# Patient Record
Sex: MALE | Race: WHITE | Employment: OTHER | URBAN - METROPOLITAN AREA
[De-identification: names, ages, dates, MRNs, and addresses within clinical notes are randomized per-mention and may not be internally consistent; named-entity substitution may affect disease eponyms.]

---

## 2017-03-24 ENCOUNTER — TELEPHONE (OUTPATIENT)
Dept: FAMILY MEDICINE CLINIC | Facility: CLINIC | Age: 57
End: 2017-03-24

## 2017-03-24 ENCOUNTER — OFFICE VISIT (OUTPATIENT)
Dept: FAMILY MEDICINE CLINIC | Facility: CLINIC | Age: 57
End: 2017-03-24

## 2017-03-24 VITALS
WEIGHT: 315 LBS | RESPIRATION RATE: 15 BRPM | TEMPERATURE: 98 F | HEART RATE: 72 BPM | DIASTOLIC BLOOD PRESSURE: 78 MMHG | BODY MASS INDEX: 61 KG/M2 | SYSTOLIC BLOOD PRESSURE: 128 MMHG | OXYGEN SATURATION: 97 %

## 2017-03-24 DIAGNOSIS — Z13.29 SCREENING FOR ENDOCRINE, METABOLIC AND IMMUNITY DISORDER: ICD-10-CM

## 2017-03-24 DIAGNOSIS — J01.10 ACUTE NON-RECURRENT FRONTAL SINUSITIS: Primary | ICD-10-CM

## 2017-03-24 DIAGNOSIS — Z13.0 SCREENING FOR ENDOCRINE, METABOLIC AND IMMUNITY DISORDER: ICD-10-CM

## 2017-03-24 DIAGNOSIS — Z13.228 SCREENING FOR ENDOCRINE, METABOLIC AND IMMUNITY DISORDER: ICD-10-CM

## 2017-03-24 DIAGNOSIS — E66.01 MORBID OBESITY, UNSPECIFIED OBESITY TYPE (HCC): ICD-10-CM

## 2017-03-24 DIAGNOSIS — R06.81 APNEA: ICD-10-CM

## 2017-03-24 DIAGNOSIS — F41.1 ANXIETY STATE: ICD-10-CM

## 2017-03-24 DIAGNOSIS — R73.03 PRE-DIABETES: ICD-10-CM

## 2017-03-24 PROCEDURE — 99214 OFFICE O/P EST MOD 30 MIN: CPT | Performed by: PHYSICIAN ASSISTANT

## 2017-03-24 RX ORDER — AMOXICILLIN AND CLAVULANATE POTASSIUM 875; 125 MG/1; MG/1
1 TABLET, FILM COATED ORAL 2 TIMES DAILY
Qty: 20 TABLET | Refills: 0 | Status: SHIPPED | OUTPATIENT
Start: 2017-03-24 | End: 2017-04-03

## 2017-03-24 NOTE — TELEPHONE ENCOUNTER
Patient has HMO. Patient must call Minerva Echols at [phone] for referral.  This is how O initiates referrals. Thank you!

## 2017-03-24 NOTE — PROGRESS NOTES
CHIEF COMPLAINT:   Patient presents with:  Nasal Congestion: Pt c/o nasal congestion, cough and sneezing X 2 days         HPI:   Bebe Larson is a 49year old male who presents for congestion for 2 days.  He admits to sneezing, sinus pressure, post nasal well nourished,in no apparent distress  SKIN: no rashes, no suspicious lesions  EYES: Conjunctiva clear. No scleral icterus. HENT: Atraumatic, normocephalic. +TTP of frontal sinuses. TM's pearly gray, no bulging, + fluid b/l.   Nostrils patent, nasal muc

## 2017-03-29 ENCOUNTER — TELEPHONE (OUTPATIENT)
Dept: FAMILY MEDICINE CLINIC | Facility: CLINIC | Age: 57
End: 2017-03-29

## 2017-03-29 DIAGNOSIS — E55.9 VITAMIN D DEFICIENCY: Primary | ICD-10-CM

## 2017-03-29 DIAGNOSIS — R79.89 LOW SERUM TESTOSTERONE LEVEL: ICD-10-CM

## 2017-03-29 RX ORDER — LEVOFLOXACIN 500 MG/1
500 TABLET, FILM COATED ORAL DAILY
Qty: 5 TABLET | Refills: 0 | Status: SHIPPED | OUTPATIENT
Start: 2017-03-29 | End: 2017-04-03

## 2017-03-29 NOTE — TELEPHONE ENCOUNTER
Please read above message & advise on any orders. Pt seen 3/24 for Sinusitis & given Rx for Augmentin.        Per previous encounter: \"patient requesting that order for vitamin d and testosterone labs be entered into epic, dr Gilmer Palafox had been monitoring th

## 2017-03-29 NOTE — TELEPHONE ENCOUNTER
Rx for levaquin sent in-recheck if not better.  Can you please let him know he can call [facility] for therapist referral

## 2017-04-10 ENCOUNTER — OFFICE VISIT (OUTPATIENT)
Dept: FAMILY MEDICINE CLINIC | Facility: CLINIC | Age: 57
End: 2017-04-10

## 2017-04-10 ENCOUNTER — HOSPITAL ENCOUNTER (OUTPATIENT)
Dept: GENERAL RADIOLOGY | Age: 57
Discharge: HOME | End: 2017-04-10
Attending: PHYSICIAN ASSISTANT
Payer: COMMERCIAL

## 2017-04-10 VITALS
DIASTOLIC BLOOD PRESSURE: 72 MMHG | HEART RATE: 62 BPM | OXYGEN SATURATION: 96 % | TEMPERATURE: 99 F | SYSTOLIC BLOOD PRESSURE: 138 MMHG | RESPIRATION RATE: 18 BRPM

## 2017-04-10 DIAGNOSIS — R60.9 EDEMA, UNSPECIFIED TYPE: ICD-10-CM

## 2017-04-10 DIAGNOSIS — R05.9 COUGH: ICD-10-CM

## 2017-04-10 DIAGNOSIS — R09.81 NASAL CONGESTION: ICD-10-CM

## 2017-04-10 DIAGNOSIS — R60.9 EDEMA, UNSPECIFIED TYPE: Primary | ICD-10-CM

## 2017-04-10 PROCEDURE — 99214 OFFICE O/P EST MOD 30 MIN: CPT | Performed by: PHYSICIAN ASSISTANT

## 2017-04-10 PROCEDURE — 71020 XR CHEST PA + LAT CHEST (CPT=71020): CPT

## 2017-04-10 RX ORDER — POTASSIUM CHLORIDE 1500 MG/1
20 TABLET, FILM COATED, EXTENDED RELEASE ORAL DAILY
Qty: 30 TABLET | Refills: 0 | Status: SHIPPED | OUTPATIENT
Start: 2017-04-10 | End: 2017-05-07

## 2017-04-10 RX ORDER — FUROSEMIDE 40 MG/1
40 TABLET ORAL DAILY
Qty: 30 TABLET | Refills: 0 | Status: SHIPPED | OUTPATIENT
Start: 2017-04-10 | End: 2017-06-11

## 2017-04-10 NOTE — PROGRESS NOTES
CHIEF COMPLAINT:     Patient presents with:  Swollen Feet: pt c\o of swelling of feet bilateral, x 2days      HPI:   Rox Duvall is a 49year old male who presents with complaints of b/l ankle/feet swelling for 2 days. No pain in the feet/ankles/calfs. rashes, skin wounds or ulcers. EYES: Denies blurred vision or double vision  HENT: See HPI   CHEST: Denies chest pain, or palpitations  LUNGS: +cough.  Denies shortness of breath   MUSCULOSKELETAL: no arthralgia or swollen joints  NEURO: Denies headaches o

## 2017-04-20 ENCOUNTER — LAB ENCOUNTER (OUTPATIENT)
Dept: LAB | Age: 57
End: 2017-04-20
Attending: FAMILY MEDICINE
Payer: COMMERCIAL

## 2017-04-20 DIAGNOSIS — Z13.29 SCREENING FOR ENDOCRINE, METABOLIC AND IMMUNITY DISORDER: ICD-10-CM

## 2017-04-20 DIAGNOSIS — Z13.0 SCREENING FOR ENDOCRINE, METABOLIC AND IMMUNITY DISORDER: ICD-10-CM

## 2017-04-20 DIAGNOSIS — R73.03 PRE-DIABETES: ICD-10-CM

## 2017-04-20 DIAGNOSIS — E55.9 VITAMIN D DEFICIENCY: ICD-10-CM

## 2017-04-20 DIAGNOSIS — Z13.228 SCREENING FOR ENDOCRINE, METABOLIC AND IMMUNITY DISORDER: ICD-10-CM

## 2017-04-20 DIAGNOSIS — R79.89 LOW SERUM TESTOSTERONE LEVEL: ICD-10-CM

## 2017-04-20 DIAGNOSIS — Z12.5 SCREENING FOR PROSTATE CANCER: ICD-10-CM

## 2017-04-20 PROCEDURE — 85025 COMPLETE CBC W/AUTO DIFF WBC: CPT

## 2017-04-20 PROCEDURE — 80053 COMPREHEN METABOLIC PANEL: CPT

## 2017-04-20 PROCEDURE — 83036 HEMOGLOBIN GLYCOSYLATED A1C: CPT

## 2017-04-20 PROCEDURE — 82306 VITAMIN D 25 HYDROXY: CPT

## 2017-04-20 PROCEDURE — 84403 ASSAY OF TOTAL TESTOSTERONE: CPT

## 2017-04-20 PROCEDURE — 80061 LIPID PANEL: CPT

## 2017-04-20 PROCEDURE — 84402 ASSAY OF FREE TESTOSTERONE: CPT

## 2017-04-20 PROCEDURE — 36415 COLL VENOUS BLD VENIPUNCTURE: CPT

## 2017-04-20 PROCEDURE — 84443 ASSAY THYROID STIM HORMONE: CPT

## 2017-04-21 ENCOUNTER — TELEPHONE (OUTPATIENT)
Dept: FAMILY MEDICINE CLINIC | Facility: CLINIC | Age: 57
End: 2017-04-21

## 2017-04-21 DIAGNOSIS — D69.6 DECREASED PLATELET COUNT (HCC): Primary | ICD-10-CM

## 2017-04-21 NOTE — TELEPHONE ENCOUNTER
Patient presented to the office today to review his test results and plan of care. Reviewed lab results with him. Order for repeat CBC placed for 1 month re-draw. He has upcoming appointment scheduled on 4/25/17 with Karol Rust.  He states he has been going t

## 2017-04-21 NOTE — TELEPHONE ENCOUNTER
----- Message from Yasmin Hunter PA-C sent at 4/21/2017 12:08 PM CDT -----  Platelets are low-repeat CBC in one month. All other labs are stable.

## 2017-04-25 ENCOUNTER — OFFICE VISIT (OUTPATIENT)
Dept: FAMILY MEDICINE CLINIC | Facility: CLINIC | Age: 57
End: 2017-04-25

## 2017-04-25 VITALS
TEMPERATURE: 98 F | WEIGHT: 315 LBS | RESPIRATION RATE: 17 BRPM | HEART RATE: 73 BPM | SYSTOLIC BLOOD PRESSURE: 126 MMHG | BODY MASS INDEX: 61 KG/M2 | DIASTOLIC BLOOD PRESSURE: 80 MMHG | OXYGEN SATURATION: 95 %

## 2017-04-25 DIAGNOSIS — R60.9 EDEMA, UNSPECIFIED TYPE: ICD-10-CM

## 2017-04-25 DIAGNOSIS — R73.09 OTHER ABNORMAL GLUCOSE: ICD-10-CM

## 2017-04-25 DIAGNOSIS — E29.1 TESTICULAR HYPOFUNCTION: ICD-10-CM

## 2017-04-25 DIAGNOSIS — G47.30 SLEEP APNEA, UNSPECIFIED TYPE: ICD-10-CM

## 2017-04-25 DIAGNOSIS — E66.01 MORBID OBESITY, UNSPECIFIED OBESITY TYPE (HCC): Primary | ICD-10-CM

## 2017-04-25 DIAGNOSIS — D69.6 THROMBOCYTOPENIA (HCC): ICD-10-CM

## 2017-04-25 PROCEDURE — 99214 OFFICE O/P EST MOD 30 MIN: CPT | Performed by: PHYSICIAN ASSISTANT

## 2017-04-25 NOTE — PROGRESS NOTES
CHIEF COMPLAINT:     Patient presents with:  Lab Results: Pt is following up on lab results       HPI:   Symone Hoffman is a 56year old male who presents to follow up on lab results and lower ext edema.  He has been taking diuretic daily and has significa palpitations  LUNGS: Denies shortness of breath, cough, or wheezing  GI: Denies abdominal pain, N/V/C/D.   MUSCULOSKELETAL: no arthralgia or swollen joints      EXAM:   /80 mmHg  Pulse 73  Temp(Src) 98.3 °F (36.8 °C) (Oral)  Resp 17  Wt 412 lb  SpO2

## 2017-05-08 RX ORDER — POTASSIUM CHLORIDE 1500 MG/1
TABLET, FILM COATED, EXTENDED RELEASE ORAL
Qty: 30 TABLET | Refills: 0 | Status: SHIPPED | OUTPATIENT
Start: 2017-05-08 | End: 2017-06-10

## 2017-05-08 RX ORDER — FUROSEMIDE 40 MG/1
TABLET ORAL
Qty: 30 TABLET | Refills: 0 | Status: SHIPPED | OUTPATIENT
Start: 2017-05-08 | End: 2018-01-10 | Stop reason: ALTCHOICE

## 2017-06-12 RX ORDER — FUROSEMIDE 40 MG/1
TABLET ORAL
Qty: 30 TABLET | Refills: 0 | Status: SHIPPED | OUTPATIENT
Start: 2017-06-12 | End: 2017-07-14

## 2017-06-12 RX ORDER — POTASSIUM CHLORIDE 1500 MG/1
TABLET, FILM COATED, EXTENDED RELEASE ORAL
Qty: 30 TABLET | Refills: 0 | Status: SHIPPED | OUTPATIENT
Start: 2017-06-12 | End: 2017-07-14

## 2017-07-17 RX ORDER — FUROSEMIDE 40 MG/1
TABLET ORAL
Qty: 30 TABLET | Refills: 0 | Status: SHIPPED | OUTPATIENT
Start: 2017-07-17 | End: 2017-08-18

## 2017-07-17 RX ORDER — POTASSIUM CHLORIDE 1500 MG/1
TABLET, FILM COATED, EXTENDED RELEASE ORAL
Qty: 30 TABLET | Refills: 0 | Status: SHIPPED | OUTPATIENT
Start: 2017-07-17 | End: 2018-01-19 | Stop reason: ALTCHOICE

## 2017-08-18 ENCOUNTER — OFFICE VISIT (OUTPATIENT)
Dept: FAMILY MEDICINE CLINIC | Facility: CLINIC | Age: 57
End: 2017-08-18

## 2017-08-18 ENCOUNTER — APPOINTMENT (OUTPATIENT)
Dept: CT IMAGING | Age: 57
End: 2017-08-18
Attending: EMERGENCY MEDICINE
Payer: COMMERCIAL

## 2017-08-18 ENCOUNTER — HOSPITAL ENCOUNTER (EMERGENCY)
Age: 57
Discharge: HOME | End: 2017-08-18
Attending: EMERGENCY MEDICINE
Payer: COMMERCIAL

## 2017-08-18 VITALS
SYSTOLIC BLOOD PRESSURE: 150 MMHG | RESPIRATION RATE: 16 BRPM | OXYGEN SATURATION: 96 % | HEIGHT: 69 IN | HEART RATE: 88 BPM | BODY MASS INDEX: 46.65 KG/M2 | WEIGHT: 315 LBS | DIASTOLIC BLOOD PRESSURE: 86 MMHG

## 2017-08-18 VITALS
TEMPERATURE: 99 F | SYSTOLIC BLOOD PRESSURE: 132 MMHG | WEIGHT: 315 LBS | OXYGEN SATURATION: 97 % | BODY MASS INDEX: 61 KG/M2 | RESPIRATION RATE: 18 BRPM | HEART RATE: 86 BPM | DIASTOLIC BLOOD PRESSURE: 78 MMHG

## 2017-08-18 DIAGNOSIS — Z02.9 ENCOUNTERS FOR ADMINISTRATIVE PURPOSES: Primary | ICD-10-CM

## 2017-08-18 DIAGNOSIS — R60.9 PERIPHERAL EDEMA: Primary | ICD-10-CM

## 2017-08-18 LAB
ALBUMIN SERPL-MCNC: 3 G/DL (ref 3.5–4.8)
ALP LIVER SERPL-CCNC: 111 U/L (ref 45–117)
ALT SERPL-CCNC: 50 U/L (ref 17–63)
AST SERPL-CCNC: 69 U/L (ref 15–41)
BASOPHILS # BLD AUTO: 0.04 X10(3) UL (ref 0–0.1)
BASOPHILS NFR BLD AUTO: 0.7 %
BILIRUB SERPL-MCNC: 1.6 MG/DL (ref 0.1–2)
BILIRUB UR QL STRIP.AUTO: NEGATIVE
BUN BLD-MCNC: 12 MG/DL (ref 8–20)
CALCIUM BLD-MCNC: 8.3 MG/DL (ref 8.3–10.3)
CHLORIDE: 108 MMOL/L (ref 101–111)
CLARITY UR REFRACT.AUTO: CLEAR
CO2: 27 MMOL/L (ref 22–32)
COLOR UR AUTO: YELLOW
CREAT BLD-MCNC: 0.84 MG/DL (ref 0.7–1.3)
EOSINOPHIL # BLD AUTO: 0.18 X10(3) UL (ref 0–0.3)
EOSINOPHIL NFR BLD AUTO: 3.1 %
ERYTHROCYTE [DISTWIDTH] IN BLOOD BY AUTOMATED COUNT: 13.8 % (ref 11.5–16)
GLUCOSE BLD-MCNC: 108 MG/DL (ref 70–99)
GLUCOSE UR STRIP.AUTO-MCNC: 250 MG/DL
HCT VFR BLD AUTO: 44.2 % (ref 37–53)
HGB BLD-MCNC: 15.5 G/DL (ref 13–17)
IMMATURE GRANULOCYTE COUNT: 0.02 X10(3) UL (ref 0–1)
IMMATURE GRANULOCYTE RATIO %: 0.3 %
KETONES UR STRIP.AUTO-MCNC: NEGATIVE MG/DL
LEUKOCYTE ESTERASE UR QL STRIP.AUTO: NEGATIVE
LYMPHOCYTES # BLD AUTO: 1.88 X10(3) UL (ref 0.9–4)
LYMPHOCYTES NFR BLD AUTO: 32.7 %
M PROTEIN MFR SERPL ELPH: 6.9 G/DL (ref 6.1–8.3)
MCH RBC QN AUTO: 35.3 PG (ref 27–33.2)
MCHC RBC AUTO-ENTMCNC: 35.1 G/DL (ref 31–37)
MCV RBC AUTO: 100.7 FL (ref 80–99)
MONOCYTES # BLD AUTO: 0.9 X10(3) UL (ref 0.1–0.6)
MONOCYTES NFR BLD AUTO: 15.7 %
NEUTROPHIL ABS PRELIM: 2.73 X10 (3) UL (ref 1.3–6.7)
NEUTROPHILS # BLD AUTO: 2.73 X10(3) UL (ref 1.3–6.7)
NEUTROPHILS NFR BLD AUTO: 47.5 %
NITRITE UR QL STRIP.AUTO: NEGATIVE
PH UR STRIP.AUTO: 7 [PH] (ref 4.5–8)
PLATELET # BLD AUTO: 130 10(3)UL (ref 150–450)
POTASSIUM SERPL-SCNC: 5 MMOL/L (ref 3.6–5.1)
PRO-BETA NATRIURETIC PEPTIDE: 10 PG/ML (ref ?–125)
PROT UR STRIP.AUTO-MCNC: NEGATIVE MG/DL
RBC # BLD AUTO: 4.39 X10(6)UL (ref 4.3–5.7)
RBC UR QL AUTO: NEGATIVE
RED CELL DISTRIBUTION WIDTH-SD: 50.5 FL (ref 35.1–46.3)
SODIUM SERPL-SCNC: 141 MMOL/L (ref 136–144)
SP GR UR STRIP.AUTO: 1.02 (ref 1–1.03)
UROBILINOGEN UR STRIP.AUTO-MCNC: 4 MG/DL
WBC # BLD AUTO: 5.8 X10(3) UL (ref 4–13)

## 2017-08-18 PROCEDURE — 99285 EMERGENCY DEPT VISIT HI MDM: CPT

## 2017-08-18 PROCEDURE — 93005 ELECTROCARDIOGRAM TRACING: CPT

## 2017-08-18 PROCEDURE — 74177 CT ABD & PELVIS W/CONTRAST: CPT | Performed by: EMERGENCY MEDICINE

## 2017-08-18 PROCEDURE — 85025 COMPLETE CBC W/AUTO DIFF WBC: CPT | Performed by: EMERGENCY MEDICINE

## 2017-08-18 PROCEDURE — 36415 COLL VENOUS BLD VENIPUNCTURE: CPT

## 2017-08-18 PROCEDURE — 81003 URINALYSIS AUTO W/O SCOPE: CPT | Performed by: EMERGENCY MEDICINE

## 2017-08-18 PROCEDURE — 83880 ASSAY OF NATRIURETIC PEPTIDE: CPT | Performed by: EMERGENCY MEDICINE

## 2017-08-18 PROCEDURE — 80053 COMPREHEN METABOLIC PANEL: CPT | Performed by: EMERGENCY MEDICINE

## 2017-08-18 PROCEDURE — 93010 ELECTROCARDIOGRAM REPORT: CPT

## 2017-08-18 RX ORDER — FUROSEMIDE 40 MG/1
40 TABLET ORAL DAILY
Qty: 3 TABLET | Refills: 0 | Status: SHIPPED | OUTPATIENT
Start: 2017-08-18 | End: 2017-08-21

## 2017-08-18 NOTE — PROGRESS NOTES
S:  Patient reports his legs have been swollen x 1 week. Patient reports when this happened before he received a \"water pill\" and his sxs resolved. Patient also reports right flank pain and history of a kidney stones.   No fevers, chills, sweats, blood

## 2017-08-18 NOTE — ED PROVIDER NOTES
Patient Seen in: THE [facility] Emergency Department In [place]    History   Patient presents with:  Swelling Edema (cardiovascular, metabolic)  Abdomen/Flank Pain (GI/)    Stated Complaint: SWELLING IN BILAT FEET AND ANKLES FOR PAST WEEK.   RIGHT SIDE FLANK tobacco: Never Used                      Alcohol use: No                Review of Systems    Positive for stated complaint: SWELLING IN BILAT FEET AND ANKLES FOR PAST WEEK. RIGHT SIDE FLANK PAIN FOR PAS*  Other systems are as noted in HPI.   Constitutional limits   CBC W/ DIFFERENTIAL - Abnormal; Notable for the following:     .0 (*)     .7 (*)     MCH 35.3 (*)     RDW-SD 50.5 (*)     Monocyte Absolute 0.90 (*)     All other components within normal limits   PRO BETA NATRIURETIC PEPTIDE - Malka Pena transmitted to the [facility]) [address] Roy [address] ([address]) which includes the Dose Index Registry.   PATIENT STATED HISTORY:(As transcribed by Technologist)  Posterier RUQ abdomen pain with swelling of lower extremity   C discharged in good condition. Disposition and Plan     Clinical Impression:  Peripheral edema  (primary encounter diagnosis)    Disposition:  There is no disposition on file for this visit. Follow-up:  No follow-up provider specified.     Medication

## 2017-08-19 LAB
ATRIAL RATE: 94 BPM
P AXIS: 46 DEGREES
P-R INTERVAL: 170 MS
Q-T INTERVAL: 368 MS
QRS DURATION: 96 MS
QTC CALCULATION (BEZET): 460 MS
R AXIS: 84 DEGREES
T AXIS: 49 DEGREES
VENTRICULAR RATE: 94 BPM

## 2017-08-22 ENCOUNTER — OFFICE VISIT (OUTPATIENT)
Dept: FAMILY MEDICINE CLINIC | Facility: CLINIC | Age: 57
End: 2017-08-22

## 2017-08-22 VITALS
HEIGHT: 69 IN | DIASTOLIC BLOOD PRESSURE: 80 MMHG | WEIGHT: 315 LBS | BODY MASS INDEX: 46.65 KG/M2 | HEART RATE: 88 BPM | SYSTOLIC BLOOD PRESSURE: 138 MMHG | TEMPERATURE: 99 F | RESPIRATION RATE: 20 BRPM

## 2017-08-22 DIAGNOSIS — K76.6 PORTAL HYPERTENSION (HCC): ICD-10-CM

## 2017-08-22 DIAGNOSIS — R93.3 ABNORMAL CT SCAN, GASTROINTESTINAL TRACT: ICD-10-CM

## 2017-08-22 DIAGNOSIS — E66.01 MORBID OBESITY (HCC): Primary | ICD-10-CM

## 2017-08-22 PROCEDURE — 99214 OFFICE O/P EST MOD 30 MIN: CPT | Performed by: FAMILY MEDICINE

## 2017-08-22 NOTE — PROGRESS NOTES
Jarrett Forbes is a 56year old male who presents for Patient presents with:  Hospital F/U: 8/18/2017, standing up hurts    HPI:   Patient's presenting for follow up from Hospital     Patient was in Hospital/ER: date(s) 8/18/17    Patient reports overall Outpatient Prescriptions:  HYDROCHLOROTHIAZIDE OR  Disp:  Rfl:    POTASSIUM CHLORIDE ER 20 MEQ Oral Tab CR TAKE 1 TABLET BY MOUTH DAILY Disp: 30 tablet Rfl: 0   CELY CONTOUR NEXT TEST In Vitro Strip USE EVERY DAY AS DIRECTED Disp: 100 strip Rfl: 0   FUROS Left Ear: External ear normal.   Nose: Nose normal.   Mouth/Throat: Oropharynx is clear and moist. No oropharyngeal exudate. Eyes: Conjunctivae and EOM are normal. Pupils are equal, round, and reactive to light. Right eye exhibits no discharge.  Left ey

## 2017-08-28 ENCOUNTER — OFFICE VISIT (OUTPATIENT)
Dept: FAMILY MEDICINE CLINIC | Facility: CLINIC | Age: 57
End: 2017-08-28

## 2017-08-28 ENCOUNTER — LAB ENCOUNTER (OUTPATIENT)
Dept: LAB | Age: 57
End: 2017-08-28
Attending: FAMILY MEDICINE
Payer: COMMERCIAL

## 2017-08-28 VITALS
HEIGHT: 69 IN | SYSTOLIC BLOOD PRESSURE: 128 MMHG | TEMPERATURE: 99 F | DIASTOLIC BLOOD PRESSURE: 82 MMHG | RESPIRATION RATE: 20 BRPM | HEART RATE: 65 BPM | OXYGEN SATURATION: 96 % | BODY MASS INDEX: 46.65 KG/M2 | WEIGHT: 315 LBS

## 2017-08-28 DIAGNOSIS — E66.8 HYPOGONADAL OBESITY: ICD-10-CM

## 2017-08-28 DIAGNOSIS — E66.01 MORBID OBESITY (HCC): ICD-10-CM

## 2017-08-28 DIAGNOSIS — F41.1 ANXIETY STATE: ICD-10-CM

## 2017-08-28 DIAGNOSIS — D75.1 POLYCYTHEMIA, SECONDARY: ICD-10-CM

## 2017-08-28 DIAGNOSIS — Z00.00 ANNUAL PHYSICAL EXAM: Primary | ICD-10-CM

## 2017-08-28 DIAGNOSIS — D69.6 THROMBOCYTOPENIA (HCC): ICD-10-CM

## 2017-08-28 DIAGNOSIS — D69.6 DECREASED PLATELET COUNT (HCC): ICD-10-CM

## 2017-08-28 DIAGNOSIS — R19.8 ABNORMAL FINDINGS-GASTROINTESTINAL TRACT: ICD-10-CM

## 2017-08-28 LAB
ALBUMIN SERPL-MCNC: 3.2 G/DL (ref 3.5–4.8)
ALP LIVER SERPL-CCNC: 107 U/L (ref 45–117)
ALT SERPL-CCNC: 53 U/L (ref 17–63)
ANA SCREEN: NEGATIVE
AST SERPL-CCNC: 50 U/L (ref 15–41)
BASOPHILS # BLD AUTO: 0.03 X10(3) UL (ref 0–0.1)
BASOPHILS NFR BLD AUTO: 0.6 %
BILIRUB SERPL-MCNC: 1.7 MG/DL (ref 0.1–2)
BUN BLD-MCNC: 7 MG/DL (ref 8–20)
CALCIUM BLD-MCNC: 8.5 MG/DL (ref 8.3–10.3)
CERULOPLASMIN: 23.6 MG/DL (ref 20–60)
CHLORIDE: 109 MMOL/L (ref 101–111)
CHOLEST SMN-MCNC: 134 MG/DL (ref ?–200)
CO2: 29 MMOL/L (ref 22–32)
CREAT BLD-MCNC: 0.58 MG/DL (ref 0.7–1.3)
DEPRECATED HBV CORE AB SER IA-ACNC: 538.9 NG/ML (ref 22–322)
EOSINOPHIL # BLD AUTO: 0.31 X10(3) UL (ref 0–0.3)
EOSINOPHIL NFR BLD AUTO: 6.4 %
ERYTHROCYTE [DISTWIDTH] IN BLOOD BY AUTOMATED COUNT: 14.1 % (ref 11.5–16)
EST. AVERAGE GLUCOSE BLD GHB EST-MCNC: 103 MG/DL (ref 68–126)
FREE T4: 1 NG/DL (ref 0.9–1.8)
GLUCOSE BLD-MCNC: 105 MG/DL (ref 70–99)
HBA1C MFR BLD HPLC: 5.2 % (ref ?–5.7)
HBV SURFACE AG SERPL QL IA: NONREACTIVE
HCT VFR BLD AUTO: 45.9 % (ref 37–53)
HDLC SERPL-MCNC: 57 MG/DL (ref 45–?)
HDLC SERPL: 2.35 {RATIO} (ref ?–4.97)
HEPATITIS A VIRUS AB, TOTAL: NONREACTIVE
HEPATITIS C VIRUS AB INTERPRETATION: NONREACTIVE
HGB BLD-MCNC: 15.3 G/DL (ref 13–17)
IMMATURE GRANULOCYTE COUNT: 0.02 X10(3) UL (ref 0–1)
IMMATURE GRANULOCYTE RATIO %: 0.4 %
IRON SATURATION: 38 % (ref 13–45)
IRON: 119 UG/DL (ref 45–182)
LDLC SERPL CALC-MCNC: 66 MG/DL (ref ?–130)
LDLC SERPL-MCNC: 11 MG/DL (ref 5–40)
LYMPHOCYTES # BLD AUTO: 1.85 X10(3) UL (ref 0.9–4)
LYMPHOCYTES NFR BLD AUTO: 38.5 %
M PROTEIN MFR SERPL ELPH: 6.8 G/DL (ref 6.1–8.3)
MCH RBC QN AUTO: 34.5 PG (ref 27–33.2)
MCHC RBC AUTO-ENTMCNC: 33.3 G/DL (ref 31–37)
MCV RBC AUTO: 103.4 FL (ref 80–99)
MONOCYTES # BLD AUTO: 0.59 X10(3) UL (ref 0.1–0.6)
MONOCYTES NFR BLD AUTO: 12.3 %
NEUTROPHIL ABS PRELIM: 2.01 X10 (3) UL (ref 1.3–6.7)
NEUTROPHILS # BLD AUTO: 2.01 X10(3) UL (ref 1.3–6.7)
NEUTROPHILS NFR BLD AUTO: 41.8 %
NONHDLC SERPL-MCNC: 77 MG/DL (ref ?–130)
PLATELET # BLD AUTO: 141 10(3)UL (ref 150–450)
POTASSIUM SERPL-SCNC: 4.3 MMOL/L (ref 3.6–5.1)
RBC # BLD AUTO: 4.44 X10(6)UL (ref 4.3–5.7)
RED CELL DISTRIBUTION WIDTH-SD: 53.1 FL (ref 35.1–46.3)
SODIUM SERPL-SCNC: 142 MMOL/L (ref 136–144)
TOTAL IRON BINDING CAPACITY: 313 UG/DL (ref 298–536)
TRANSFERRIN: 210 MG/DL (ref 200–360)
TRIGLYCERIDES: 57 MG/DL (ref ?–150)
TSI SER-ACNC: 1.77 MIU/ML (ref 0.35–5.5)
WBC # BLD AUTO: 4.8 X10(3) UL (ref 4–13)

## 2017-08-28 PROCEDURE — 36415 COLL VENOUS BLD VENIPUNCTURE: CPT

## 2017-08-28 PROCEDURE — 83540 ASSAY OF IRON: CPT

## 2017-08-28 PROCEDURE — 85025 COMPLETE CBC W/AUTO DIFF WBC: CPT

## 2017-08-28 PROCEDURE — 86803 HEPATITIS C AB TEST: CPT

## 2017-08-28 PROCEDURE — 83516 IMMUNOASSAY NONANTIBODY: CPT

## 2017-08-28 PROCEDURE — 82103 ALPHA-1-ANTITRYPSIN TOTAL: CPT

## 2017-08-28 PROCEDURE — 86256 FLUORESCENT ANTIBODY TITER: CPT

## 2017-08-28 PROCEDURE — 80061 LIPID PANEL: CPT

## 2017-08-28 PROCEDURE — 82728 ASSAY OF FERRITIN: CPT

## 2017-08-28 PROCEDURE — 84443 ASSAY THYROID STIM HORMONE: CPT

## 2017-08-28 PROCEDURE — 84439 ASSAY OF FREE THYROXINE: CPT

## 2017-08-28 PROCEDURE — 82104 ALPHA-1-ANTITRYPSIN PHENO: CPT

## 2017-08-28 PROCEDURE — 80053 COMPREHEN METABOLIC PANEL: CPT

## 2017-08-28 PROCEDURE — 99396 PREV VISIT EST AGE 40-64: CPT | Performed by: FAMILY MEDICINE

## 2017-08-28 PROCEDURE — 86038 ANTINUCLEAR ANTIBODIES: CPT

## 2017-08-28 PROCEDURE — 82390 ASSAY OF CERULOPLASMIN: CPT

## 2017-08-28 PROCEDURE — 84403 ASSAY OF TOTAL TESTOSTERONE: CPT

## 2017-08-28 PROCEDURE — 87340 HEPATITIS B SURFACE AG IA: CPT

## 2017-08-28 PROCEDURE — 83550 IRON BINDING TEST: CPT

## 2017-08-28 PROCEDURE — 86708 HEPATITIS A ANTIBODY: CPT

## 2017-08-28 PROCEDURE — 83036 HEMOGLOBIN GLYCOSYLATED A1C: CPT

## 2017-08-28 PROCEDURE — 84402 ASSAY OF FREE TESTOSTERONE: CPT

## 2017-08-29 ENCOUNTER — HOSPITAL ENCOUNTER (OUTPATIENT)
Dept: ULTRASOUND IMAGING | Facility: HOSPITAL | Age: 57
Discharge: HOME | End: 2017-08-29
Attending: INTERNAL MEDICINE
Payer: COMMERCIAL

## 2017-08-29 ENCOUNTER — APPOINTMENT (OUTPATIENT)
Dept: LAB | Facility: HOSPITAL | Age: 57
End: 2017-08-29
Attending: INTERNAL MEDICINE
Payer: COMMERCIAL

## 2017-08-29 DIAGNOSIS — R19.8 ABNORMAL FINDINGS-GASTROINTESTINAL TRACT: ICD-10-CM

## 2017-08-29 LAB
F-ACTIN (SMOOTH MUSCLE) AB: 26 UNITS
MITOCHONDRIAL M2 AB, IGG: 2.8 UNITS

## 2017-08-29 PROCEDURE — 93975 VASCULAR STUDY: CPT | Performed by: INTERNAL MEDICINE

## 2017-08-29 PROCEDURE — 76700 US EXAM ABDOM COMPLETE: CPT | Performed by: INTERNAL MEDICINE

## 2017-08-29 NOTE — PROGRESS NOTES
Chief Complaint:   Patient presents with:   Annual: physical and fasting labs     HPI:   This is a 56year old male presenting for physical.  Patient has a history of high BMI of 60 needs weight loss referral has been working on diet and exercise with no im fasting sugar once per day Disp: 100 strip Rfl: 1   Lancets Does not apply Misc To check blood sugar once daily Disp: 100 each Rfl: 1      Counseling given: Not Answered       REVIEW OF SYSTEMS:   Review of Systems   Constitutional: Negative for chills, di Nursing note and vitals reviewed. Constitutional: He is oriented to person, place, and time. He appears well-developed and well-nourished. No distress. HENT:   Head: Normocephalic and atraumatic.    Nose: Nose normal.   Mouth/Throat: Oropharynx is lonny -weight loss referral and metabolic labs today. -f/u in 3 months after seen by weight loss.

## 2017-08-30 ENCOUNTER — MED REC SCAN ONLY (OUTPATIENT)
Dept: FAMILY MEDICINE CLINIC | Facility: CLINIC | Age: 57
End: 2017-08-30

## 2017-08-30 LAB — ALPHA-1-ANTITRYPSIN: 130 MG/DL

## 2017-08-31 LAB
TESTOSTERONE TOTAL: 429 NG/DL
TESTOSTERONE, FREE -MS/MS: 17.1 PG/ML

## 2017-09-06 ENCOUNTER — TELEPHONE (OUTPATIENT)
Dept: FAMILY MEDICINE CLINIC | Facility: CLINIC | Age: 57
End: 2017-09-06

## 2017-09-06 DIAGNOSIS — D75.1 POLYCYTHEMIA, SECONDARY: ICD-10-CM

## 2017-09-06 DIAGNOSIS — R79.89 LOW TESTOSTERONE: Primary | ICD-10-CM

## 2017-09-06 DIAGNOSIS — R73.09 OTHER ABNORMAL GLUCOSE: ICD-10-CM

## 2017-09-06 NOTE — TELEPHONE ENCOUNTER
----- Message from Roshni Damon MD sent at 9/5/2017  8:07 PM CDT -----  Low T, please refer to Dr. Jessica Hughes, urology. Needs repeat cbc and cmp in 3 months.

## 2017-09-06 NOTE — TELEPHONE ENCOUNTER
Called and spoke with pt. Pt informed of lab results below. Pt states understanding and agrees to plan.  contact information sent to pt as requested.

## 2017-09-06 NOTE — TELEPHONE ENCOUNTER
Patient returning call. Patient mentioned [place] and feel anxious about not knowing his results. Please call and advise. Please call cell on file [phone].

## 2017-09-18 RX ORDER — POTASSIUM CHLORIDE 1500 MG/1
TABLET, FILM COATED, EXTENDED RELEASE ORAL
Qty: 30 TABLET | Refills: 0 | Status: SHIPPED | OUTPATIENT
Start: 2017-09-18 | End: 2017-10-15

## 2017-09-18 RX ORDER — FUROSEMIDE 40 MG/1
TABLET ORAL
Qty: 30 TABLET | Refills: 0 | Status: SHIPPED | OUTPATIENT
Start: 2017-09-18 | End: 2017-10-15

## 2017-09-18 NOTE — TELEPHONE ENCOUNTER
LOV 8/28/17        Furosemide: 5/8/17  Potassium chloride: 7/17/17    Please approve or deny Rx refill request.   Thank you.

## 2017-09-18 NOTE — TELEPHONE ENCOUNTER
Called pt pharmacy, [facility], who states that he has not had that script filled there. Called and spoke with patient, who states that he is not completely sure on what his medication is called.  Advised patient to bring his medications to next blood pre

## 2017-09-19 ENCOUNTER — NURSE ONLY (OUTPATIENT)
Dept: FAMILY MEDICINE CLINIC | Facility: CLINIC | Age: 57
End: 2017-09-19

## 2017-09-19 VITALS — DIASTOLIC BLOOD PRESSURE: 80 MMHG | SYSTOLIC BLOOD PRESSURE: 140 MMHG

## 2017-09-25 ENCOUNTER — NURSE ONLY (OUTPATIENT)
Dept: FAMILY MEDICINE CLINIC | Facility: CLINIC | Age: 57
End: 2017-09-25

## 2017-09-25 VITALS — DIASTOLIC BLOOD PRESSURE: 80 MMHG | SYSTOLIC BLOOD PRESSURE: 138 MMHG

## 2017-09-28 ENCOUNTER — TELEPHONE (OUTPATIENT)
Dept: FAMILY MEDICINE CLINIC | Facility: CLINIC | Age: 57
End: 2017-09-28

## 2017-09-28 NOTE — TELEPHONE ENCOUNTER
Patient left a voice message regarding that he is at [facility] . Dariela Burris ... Something and that they do not have him on the schedule. What Lung doctor was he to go to for his sleep apnea? ?

## 2017-09-28 NOTE — TELEPHONE ENCOUNTER
Left message for patient to call back. Wanted to get more information from him. I don't see that he ever had a sleep titration study done through [facility]. It was ordered in March by Afua Corona.  Since he's IHP, I think he'd have to have his sleep study don

## 2017-10-03 ENCOUNTER — NURSE ONLY (OUTPATIENT)
Dept: FAMILY MEDICINE CLINIC | Facility: CLINIC | Age: 57
End: 2017-10-03

## 2017-10-03 VITALS — SYSTOLIC BLOOD PRESSURE: 140 MMHG | DIASTOLIC BLOOD PRESSURE: 80 MMHG

## 2017-10-03 PROCEDURE — 84270 ASSAY OF SEX HORMONE GLOBUL: CPT | Performed by: UROLOGY

## 2017-10-03 PROCEDURE — 36415 COLL VENOUS BLD VENIPUNCTURE: CPT | Performed by: UROLOGY

## 2017-10-03 PROCEDURE — 84403 ASSAY OF TOTAL TESTOSTERONE: CPT | Performed by: UROLOGY

## 2017-10-04 ENCOUNTER — MED REC SCAN ONLY (OUTPATIENT)
Dept: FAMILY MEDICINE CLINIC | Facility: CLINIC | Age: 57
End: 2017-10-04

## 2017-10-13 ENCOUNTER — HOSPITAL ENCOUNTER (EMERGENCY)
Age: 57
Discharge: HOME | End: 2017-10-13
Attending: EMERGENCY MEDICINE
Payer: COMMERCIAL

## 2017-10-13 VITALS
RESPIRATION RATE: 19 BRPM | HEART RATE: 84 BPM | HEIGHT: 70 IN | DIASTOLIC BLOOD PRESSURE: 85 MMHG | SYSTOLIC BLOOD PRESSURE: 177 MMHG | BODY MASS INDEX: 45.1 KG/M2 | TEMPERATURE: 99 F | OXYGEN SATURATION: 97 % | WEIGHT: 315 LBS

## 2017-10-13 DIAGNOSIS — K59.00 CONSTIPATION, UNSPECIFIED CONSTIPATION TYPE: Primary | ICD-10-CM

## 2017-10-13 PROCEDURE — 99283 EMERGENCY DEPT VISIT LOW MDM: CPT

## 2017-10-13 NOTE — ED PROVIDER NOTES
Patient Seen in: THE [facility] Emergency Department In [place]    History   Patient presents with:  Constipation (gastrointestinal)    Stated Complaint:     HPI    This is a 82-year-old male who presents with chief complaint of constipation.   Patient states °C)  Temp src: Temporal  SpO2: 97 %  O2 Device: None (Room air)    Current:BP (!) 177/85   Pulse 84   Temp 98.8 °F (37.1 °C) (Temporal)   Resp 19   Ht 177.8 cm (5' 10\")   Wt (!) 186 kg   SpO2 97%   BMI 58.83 kg/m²         Physical Exam    GENERAL: Awake,

## 2017-10-13 NOTE — ED INITIAL ASSESSMENT (HPI)
No BM since MOnday, has urge to go but states it is too painful.  No results from mag citrate taken at 2030/2330

## 2017-10-16 RX ORDER — POTASSIUM CHLORIDE 1500 MG/1
TABLET, FILM COATED, EXTENDED RELEASE ORAL
Qty: 30 TABLET | Refills: 0 | Status: SHIPPED | OUTPATIENT
Start: 2017-10-16 | End: 2018-01-10

## 2017-10-16 RX ORDER — FUROSEMIDE 40 MG/1
TABLET ORAL
Qty: 30 TABLET | Refills: 0 | Status: SHIPPED | OUTPATIENT
Start: 2017-10-16 | End: 2018-01-10

## 2018-01-10 ENCOUNTER — OFFICE VISIT (OUTPATIENT)
Dept: FAMILY MEDICINE CLINIC | Facility: CLINIC | Age: 58
End: 2018-01-10

## 2018-01-10 VITALS
TEMPERATURE: 98 F | RESPIRATION RATE: 18 BRPM | DIASTOLIC BLOOD PRESSURE: 78 MMHG | BODY MASS INDEX: 60 KG/M2 | SYSTOLIC BLOOD PRESSURE: 130 MMHG | HEART RATE: 68 BPM | WEIGHT: 315 LBS | OXYGEN SATURATION: 97 %

## 2018-01-10 DIAGNOSIS — J02.9 SORE THROAT: Primary | ICD-10-CM

## 2018-01-10 DIAGNOSIS — J06.9 VIRAL UPPER RESPIRATORY TRACT INFECTION: ICD-10-CM

## 2018-01-10 LAB
CONTROL LINE PRESENT WITH A CLEAR BACKGROUND (YES/NO): YES YES/NO
STREP GRP A CUL-SCR: NEGATIVE

## 2018-01-10 PROCEDURE — 87880 STREP A ASSAY W/OPTIC: CPT | Performed by: NURSE PRACTITIONER

## 2018-01-10 PROCEDURE — 99213 OFFICE O/P EST LOW 20 MIN: CPT | Performed by: NURSE PRACTITIONER

## 2018-01-11 NOTE — PROGRESS NOTES
CHIEF COMPLAINT:   Patient presents with:  Sore Throat: Pt c/o sore throat and cough X 2 days      HPI:   Kam Gamez is a 56year old male who presents for upper respiratory symptoms for  2 days. Patient reports sore throat, and dry cough.   Reports co LUNGS: denies shortness of breath or wheezing, See HPI  CARDIOVASCULAR: denies chest pain or palpitations   GI: denies N/V/C or abdominal pain  NEURO: Denies headaches    EXAM:   /78   Pulse 68   Temp 98.1 °F (36.7 °C) (Oral)   Wt (!) 420 lb   SpO2 9 You have a viral upper respiratory illness (URI), which is another term for the common cold. This illness is contagious during the first few days. It is spread through the air by coughing and sneezing.  It may also be spread by direct contact (touching the · Cough with lots of colored sputum (mucus)  · Severe headache; face, neck, or ear pain  · Difficulty swallowing due to throat pain  · Fever of 100.4°F (38°C) or higher, or as directed by your healthcare provider  Call 911  Call 911 if any of these occur: · For sore throats caused by allergies, try antihistamines to block the allergic reaction. · Remember: unless a sore throat is caused by a bacterial infection, antibiotics won’t help you.   Prevent future sore throats  Prevention tips include the following

## 2018-01-11 NOTE — PATIENT INSTRUCTIONS
Viral Upper Respiratory Illness (Adult)  You have a viral upper respiratory illness (URI), which is another term for the common cold. This illness is contagious during the first few days. It is spread through the air by coughing and sneezing.  It may also Call your healthcare provider right away if any of these occur:  · Cough with lots of colored sputum (mucus)  · Severe headache; face, neck, or ear pain  · Difficulty swallowing due to throat pain  · Fever of 100.4°F (38°C) or higher, or as directed by you · Ease pain with anesthetic sprays. Aspirin or an aspirin substitute also helps.  Remember, never give aspirin to anyone 25 or younger, or if you are already taking blood thinners.   · For sore throats caused by allergies, try antihistamines to block the al

## 2018-01-19 ENCOUNTER — OFFICE VISIT (OUTPATIENT)
Dept: FAMILY MEDICINE CLINIC | Facility: CLINIC | Age: 58
End: 2018-01-19

## 2018-01-19 VITALS
TEMPERATURE: 98 F | SYSTOLIC BLOOD PRESSURE: 136 MMHG | OXYGEN SATURATION: 98 % | HEART RATE: 84 BPM | DIASTOLIC BLOOD PRESSURE: 80 MMHG | RESPIRATION RATE: 18 BRPM

## 2018-01-19 DIAGNOSIS — J01.00 ACUTE MAXILLARY SINUSITIS, RECURRENCE NOT SPECIFIED: Primary | ICD-10-CM

## 2018-01-19 PROCEDURE — 99213 OFFICE O/P EST LOW 20 MIN: CPT | Performed by: NURSE PRACTITIONER

## 2018-01-19 RX ORDER — IPRATROPIUM BROMIDE AND ALBUTEROL SULFATE 2.5; .5 MG/3ML; MG/3ML
3 SOLUTION RESPIRATORY (INHALATION) ONCE
Status: DISCONTINUED | OUTPATIENT
Start: 2018-01-19 | End: 2018-01-19

## 2018-01-19 RX ORDER — AMOXICILLIN AND CLAVULANATE POTASSIUM 875; 125 MG/1; MG/1
1 TABLET, FILM COATED ORAL 2 TIMES DAILY
Qty: 20 TABLET | Refills: 0 | Status: SHIPPED | OUTPATIENT
Start: 2018-01-19 | End: 2018-01-29

## 2018-01-19 NOTE — PROGRESS NOTES
CHIEF COMPLAINT:   Patient presents with:  Nasal Congestion      HPI:   Samira Manjarrez is a 56year old male who presents for cold symptoms for  11  days. Cough, sore throat, sneeze, nasal congestion, yellow rhinitis, headache, eye pain.    Symptoms have p /80 (BP Location: Right arm, Patient Position: Sitting, Cuff Size: large)   Pulse 84   Temp 98.2 °F (36.8 °C) (Oral)   Resp 18   SpO2 98%   GENERAL: well developed, well nourished,in no apparent distress  SKIN: no rashes,no suspicious lesions  HEAD: Drinking extra fluids helps thin your mucus. This lets it drain from your sinuses more easily. Have a glass of water every hour or two. A humidifier helps in much the same way. Fluids can also offset the drying effects of certain medicines.  If you use a hu

## 2018-02-28 ENCOUNTER — OFFICE VISIT (OUTPATIENT)
Dept: FAMILY MEDICINE CLINIC | Facility: CLINIC | Age: 58
End: 2018-02-28

## 2018-02-28 VITALS
HEART RATE: 73 BPM | WEIGHT: 315 LBS | BODY MASS INDEX: 60 KG/M2 | SYSTOLIC BLOOD PRESSURE: 136 MMHG | OXYGEN SATURATION: 96 % | RESPIRATION RATE: 18 BRPM | TEMPERATURE: 98 F | DIASTOLIC BLOOD PRESSURE: 78 MMHG

## 2018-02-28 DIAGNOSIS — B49 FUNGAL INFECTION: Primary | ICD-10-CM

## 2018-02-28 PROCEDURE — 99213 OFFICE O/P EST LOW 20 MIN: CPT | Performed by: NURSE PRACTITIONER

## 2018-02-28 RX ORDER — CLOTRIMAZOLE AND BETAMETHASONE DIPROPIONATE 10; .64 MG/G; MG/G
1 CREAM TOPICAL 2 TIMES DAILY
Qty: 60 G | Refills: 0 | Status: SHIPPED | OUTPATIENT
Start: 2018-02-28 | End: 2018-03-07

## 2018-03-01 NOTE — PROGRESS NOTES
CHIEF COMPLAINT:     Patient presents with: Athlete's Foot: Pt c/o possible athletes foot on LT foot     HPI:   Jarrett Forbes is a 56year old male who presents with complaints of possible athletes foot on left foot for past 6 months.  Pt reports he's be cracking areas approx 16cm length by 4 cm, no swelling, no erythema or scaling. HEAD: atraumatic, normocephalic  EYES: conjunctiva clear  NOSE: nostrils patent  NECK: supple, non-tender  LUNGS: clear to auscultation bilaterally, no wheezes or rhonchi.  Br

## 2018-03-08 ENCOUNTER — OFFICE VISIT (OUTPATIENT)
Dept: FAMILY MEDICINE CLINIC | Facility: CLINIC | Age: 58
End: 2018-03-08

## 2018-03-08 VITALS
HEART RATE: 88 BPM | DIASTOLIC BLOOD PRESSURE: 80 MMHG | RESPIRATION RATE: 18 BRPM | SYSTOLIC BLOOD PRESSURE: 160 MMHG | BODY MASS INDEX: 60 KG/M2 | WEIGHT: 315 LBS | TEMPERATURE: 98 F | OXYGEN SATURATION: 98 %

## 2018-03-08 DIAGNOSIS — L03.116 CELLULITIS OF LEFT LOWER EXTREMITY: Primary | ICD-10-CM

## 2018-03-08 PROCEDURE — 99213 OFFICE O/P EST LOW 20 MIN: CPT | Performed by: NURSE PRACTITIONER

## 2018-03-08 RX ORDER — CEPHALEXIN 500 MG/1
500 CAPSULE ORAL 4 TIMES DAILY
Qty: 28 CAPSULE | Refills: 0 | Status: SHIPPED | OUTPATIENT
Start: 2018-03-08 | End: 2018-03-15

## 2018-03-09 ENCOUNTER — OFFICE VISIT (OUTPATIENT)
Dept: FAMILY MEDICINE CLINIC | Facility: CLINIC | Age: 58
End: 2018-03-09

## 2018-03-09 VITALS
BODY MASS INDEX: 59 KG/M2 | TEMPERATURE: 98 F | WEIGHT: 315 LBS | SYSTOLIC BLOOD PRESSURE: 132 MMHG | HEART RATE: 81 BPM | RESPIRATION RATE: 18 BRPM | OXYGEN SATURATION: 93 % | DIASTOLIC BLOOD PRESSURE: 82 MMHG

## 2018-03-09 DIAGNOSIS — L84 CALLUS OF FOOT: ICD-10-CM

## 2018-03-09 DIAGNOSIS — R23.4 FISSURE IN SKIN OF FOOT: ICD-10-CM

## 2018-03-09 DIAGNOSIS — L03.116 LEFT LEG CELLULITIS: Primary | ICD-10-CM

## 2018-03-09 PROCEDURE — 99213 OFFICE O/P EST LOW 20 MIN: CPT | Performed by: EMERGENCY MEDICINE

## 2018-03-09 NOTE — PROGRESS NOTES
CHIEF COMPLAINT:   Patient presents with:  Foot/Leg issue: pt c\o of left leg swelling and redness after eating 4 Polish sausages and 3 bowls of cereal     HPI:     Clifford Pfeiffer is a 56year old male who presents with concerns of left leg 'rash'/redness CHEST: no chest pains, no palpitations. LUNGS: denies shortness of breath with exertion or rest. No wheezing, no cough. LYMPH: no enlargement of the lymph nodes. MUSC/SKEL: no joint swelling, no joint stiffness.   CARDIOVASCULAR: denies chest pain on exe PLAN: If no improvement in next 24hrs or fever develops go to ER. Skin care discussed with patient. Instructions and Comfort Care as listed in Patient Instructions. Medication as below.  Pt needs to follow up with Dr. Amara Fowler as discussed in [address] visit When to call your healthcare provider  Call your healthcare provider immediately if you have any of the following:  · Difficulty or pain when moving the joints above or below the infected area  · Discharge or pus draining from the area  · Fever of 100.4°F · Take all of the antibiotic medicine exactly as directed until it is gone. Do not miss any doses, especially during the first 7 days. Don’t stop taking the medicine when your symptoms get better. · Keep the affected area clean and dry.   · Wash your hands

## 2018-03-09 NOTE — PROGRESS NOTES
Chief Complaint:   Patient presents with: Infection: Fungal infection f/u   Follow - Up    HPI:   This is a 56year old male     FF UP CELLULITIS  Cellulitis of left lower leg. Currently on Cephalexin, day 2. NO fever. C/O mild dizziness. No cold sweats. /82   Pulse 81   Temp 98.2 °F (36.8 °C) (Oral)   Resp 18   Wt (!) 414 lb   SpO2 93%   BMI 59.40 kg/m²  Estimated body mass index is 59.4 kg/m² as calculated from the following:    Height as of 10/13/17: 70\". Weight as of this encounter: 414 lb.

## 2018-03-09 NOTE — PATIENT INSTRUCTIONS
Thank you for choosing [address] [place] [address] Group  To Do:  FOR AYANA HARRIS  1. Follow up with podiatry regarding left heel, Dr. aYir Sofia  2. contoinue with Cephlexin  3. Recheck cellulitis here in clinic on Monday or Tuesday  4.  Go to ER if with any worsening

## 2018-03-09 NOTE — PATIENT INSTRUCTIONS
Discharge Instructions for Cellulitis  You have been diagnosed with cellulitis. This is an infection in the deepest layer of the skin. In some cases, the infection also affects the muscle. Cellulitis is caused by bacteria.  The bacteria can enter the body Date Last Reviewed: 8/1/2016  © 8206-1010 [employer] [address]. [address] [facility] [facility] [place]. All rights reserved. This information is not intended as a substitute for professional medical care.  Always follow your healthcare professional' Follow up with your healthcare provider, or as advised. If your infection does not go away on the first antibiotic, your healthcare provider will prescribe a different one.   When to seek medical advice  Call your healthcare provider right away if any of th

## 2018-03-14 ENCOUNTER — OFFICE VISIT (OUTPATIENT)
Dept: FAMILY MEDICINE CLINIC | Facility: CLINIC | Age: 58
End: 2018-03-14

## 2018-03-14 ENCOUNTER — LAB ENCOUNTER (OUTPATIENT)
Dept: LAB | Age: 58
End: 2018-03-14
Attending: FAMILY MEDICINE
Payer: COMMERCIAL

## 2018-03-14 DIAGNOSIS — G47.33 OSA ON CPAP: ICD-10-CM

## 2018-03-14 DIAGNOSIS — D75.1 POLYCYTHEMIA, SECONDARY: ICD-10-CM

## 2018-03-14 DIAGNOSIS — L03.116 LEFT LEG CELLULITIS: ICD-10-CM

## 2018-03-14 DIAGNOSIS — Z99.89 OSA ON CPAP: ICD-10-CM

## 2018-03-14 DIAGNOSIS — M79.672 LEFT FOOT PAIN: Primary | ICD-10-CM

## 2018-03-14 DIAGNOSIS — R73.09 OTHER ABNORMAL GLUCOSE: ICD-10-CM

## 2018-03-14 DIAGNOSIS — R74.01 ELEVATED ALANINE AMINOTRANSFERASE (ALT) LEVEL: ICD-10-CM

## 2018-03-14 DIAGNOSIS — R74.8 ELEVATED LIVER ENZYMES: ICD-10-CM

## 2018-03-14 DIAGNOSIS — E66.01 MORBID OBESITY (HCC): ICD-10-CM

## 2018-03-14 LAB
ALBUMIN SERPL-MCNC: 3.2 G/DL (ref 3.5–4.8)
ALP LIVER SERPL-CCNC: 115 U/L (ref 45–117)
ALT SERPL-CCNC: 61 U/L (ref 17–63)
AST SERPL-CCNC: 69 U/L (ref 15–41)
BASOPHILS # BLD AUTO: 0.06 X10(3) UL (ref 0–0.1)
BASOPHILS NFR BLD AUTO: 0.9 %
BILIRUB SERPL-MCNC: 1.4 MG/DL (ref 0.1–2)
BUN BLD-MCNC: 11 MG/DL (ref 8–20)
CALCIUM BLD-MCNC: 8.7 MG/DL (ref 8.3–10.3)
CHLORIDE: 107 MMOL/L (ref 101–111)
CO2: 27 MMOL/L (ref 22–32)
CREAT BLD-MCNC: 0.68 MG/DL (ref 0.7–1.3)
EOSINOPHIL # BLD AUTO: 0.33 X10(3) UL (ref 0–0.3)
EOSINOPHIL NFR BLD AUTO: 5.1 %
ERYTHROCYTE [DISTWIDTH] IN BLOOD BY AUTOMATED COUNT: 13.7 % (ref 11.5–16)
GLUCOSE BLD-MCNC: 106 MG/DL (ref 70–99)
HCT VFR BLD AUTO: 49.4 % (ref 37–53)
HGB BLD-MCNC: 16.7 G/DL (ref 13–17)
IMMATURE GRANULOCYTE COUNT: 0.06 X10(3) UL (ref 0–1)
IMMATURE GRANULOCYTE RATIO %: 0.9 %
LYMPHOCYTES # BLD AUTO: 2.33 X10(3) UL (ref 0.9–4)
LYMPHOCYTES NFR BLD AUTO: 35.8 %
M PROTEIN MFR SERPL ELPH: 7.2 G/DL (ref 6.1–8.3)
MCH RBC QN AUTO: 35.1 PG (ref 27–33.2)
MCHC RBC AUTO-ENTMCNC: 33.8 G/DL (ref 31–37)
MCV RBC AUTO: 103.8 FL (ref 80–99)
MONOCYTES # BLD AUTO: 0.63 X10(3) UL (ref 0.1–1)
MONOCYTES NFR BLD AUTO: 9.7 %
NEUTROPHIL ABS PRELIM: 3.1 X10 (3) UL (ref 1.3–6.7)
NEUTROPHILS # BLD AUTO: 3.1 X10(3) UL (ref 1.3–6.7)
NEUTROPHILS NFR BLD AUTO: 47.6 %
PLATELET # BLD AUTO: 159 10(3)UL (ref 150–450)
POTASSIUM SERPL-SCNC: 4.2 MMOL/L (ref 3.6–5.1)
RBC # BLD AUTO: 4.76 X10(6)UL (ref 4.3–5.7)
RED CELL DISTRIBUTION WIDTH-SD: 52.8 FL (ref 35.1–46.3)
SODIUM SERPL-SCNC: 140 MMOL/L (ref 136–144)
WBC # BLD AUTO: 6.5 X10(3) UL (ref 4–13)

## 2018-03-14 PROCEDURE — 36415 COLL VENOUS BLD VENIPUNCTURE: CPT

## 2018-03-14 PROCEDURE — 99214 OFFICE O/P EST MOD 30 MIN: CPT | Performed by: FAMILY MEDICINE

## 2018-03-14 PROCEDURE — 86256 FLUORESCENT ANTIBODY TITER: CPT

## 2018-03-14 PROCEDURE — 83516 IMMUNOASSAY NONANTIBODY: CPT

## 2018-03-14 PROCEDURE — 80053 COMPREHEN METABOLIC PANEL: CPT

## 2018-03-14 PROCEDURE — 85025 COMPLETE CBC W/AUTO DIFF WBC: CPT

## 2018-03-14 RX ORDER — DOXYCYCLINE HYCLATE 100 MG
TABLET ORAL
Qty: 30 TABLET | Refills: 1 | Status: SHIPPED | OUTPATIENT
Start: 2018-03-14 | End: 2018-04-04

## 2018-03-15 VITALS
HEART RATE: 70 BPM | BODY MASS INDEX: 46.65 KG/M2 | SYSTOLIC BLOOD PRESSURE: 140 MMHG | DIASTOLIC BLOOD PRESSURE: 80 MMHG | WEIGHT: 315 LBS | TEMPERATURE: 98 F | HEIGHT: 69 IN | RESPIRATION RATE: 18 BRPM

## 2018-03-15 NOTE — PROGRESS NOTES
Chief Complaint:   Patient presents with:  Derm Problem: Left foot    HPI:   This is a 56year old male presenting for physical.  Patient has a history of high BMI of 60 needs weight loss referral has been working on diet and exercise with no improvement i Outpatient Prescriptions:  Doxycycline Hyclate 100 MG Oral Tab 1 tablet po q daily Disp: 30 tablet Rfl: 1   betamethasone valerate 0.1 % External Ointment Apply 1 Application topically 2 (two) times daily.  Disp: 45 g Rfl: 0   Urea 40 % External Cream Apply BMI 60.95 kg/m²  Estimated body mass index is 60.95 kg/m² as calculated from the following:    Height as of this encounter: 69\". Weight as of this encounter: 412 lb 12 oz. Vital signs reviewed. Appears stated age, well groomed.   Physical Exam   Nursin steroid cream  - Doxycycline Hyclate 100 MG Oral Tab; 1 tablet po q daily  Dispense: 30 tablet; Refill: 1  - betamethasone valerate 0.1 % External Ointment; Apply 1 Application topically 2 (two) times daily. Dispense: 45 g; Refill: 0    2.  Left leg cellul

## 2018-03-16 ENCOUNTER — TELEPHONE (OUTPATIENT)
Dept: FAMILY MEDICINE CLINIC | Facility: CLINIC | Age: 58
End: 2018-03-16

## 2018-03-16 DIAGNOSIS — D75.1 POLYCYTHEMIA, SECONDARY: ICD-10-CM

## 2018-03-16 DIAGNOSIS — R73.09 OTHER ABNORMAL GLUCOSE: Primary | ICD-10-CM

## 2018-03-16 NOTE — TELEPHONE ENCOUNTER
Spoke with pt regarding results and instructions listed below. Pt verbalizes understanding. Labs ordered for 3 month repeat.

## 2018-03-19 ENCOUNTER — TELEPHONE (OUTPATIENT)
Dept: FAMILY MEDICINE CLINIC | Facility: CLINIC | Age: 58
End: 2018-03-19

## 2018-03-19 DIAGNOSIS — E66.01 MORBID OBESITY (HCC): Primary | ICD-10-CM

## 2018-03-19 NOTE — TELEPHONE ENCOUNTER
Below message received from referral dept regarding bariatric referral:    Hello,      Please provide the bariatric checklist for patient. If the requested information is not received within 48 hours, this request will be cancelled.       Gilberto Rodriguez     Patient

## 2018-03-21 LAB — F-ACTIN (SMOOTH MUSCLE) AB: 33 UNITS

## 2018-03-27 DIAGNOSIS — M79.672 LEFT FOOT PAIN: ICD-10-CM

## 2018-03-28 NOTE — PROGRESS NOTES
Date: 3/28/18    To: Alec Chiu  : [birth year]    I hope this letter finds you doing well. I am writing to inform you of the following: The results of your recent lab tests showed mild stable elevation of the liver function tests.          Please c

## 2018-04-02 ENCOUNTER — OFFICE VISIT (OUTPATIENT)
Dept: FAMILY MEDICINE CLINIC | Facility: CLINIC | Age: 58
End: 2018-04-02

## 2018-04-02 VITALS
OXYGEN SATURATION: 98 % | RESPIRATION RATE: 18 BRPM | TEMPERATURE: 99 F | DIASTOLIC BLOOD PRESSURE: 82 MMHG | BODY MASS INDEX: 61 KG/M2 | HEART RATE: 83 BPM | SYSTOLIC BLOOD PRESSURE: 140 MMHG | WEIGHT: 315 LBS

## 2018-04-02 DIAGNOSIS — L03.116 CELLULITIS OF LEFT LOWER EXTREMITY: Primary | ICD-10-CM

## 2018-04-02 DIAGNOSIS — M79.672 LEFT FOOT PAIN: ICD-10-CM

## 2018-04-02 PROCEDURE — 99213 OFFICE O/P EST LOW 20 MIN: CPT | Performed by: NURSE PRACTITIONER

## 2018-04-02 RX ORDER — SULFAMETHOXAZOLE AND TRIMETHOPRIM 800; 160 MG/1; MG/1
1 TABLET ORAL 2 TIMES DAILY
Qty: 14 TABLET | Refills: 0 | Status: SHIPPED | OUTPATIENT
Start: 2018-04-02 | End: 2018-04-09

## 2018-04-03 ENCOUNTER — TELEPHONE (OUTPATIENT)
Dept: FAMILY MEDICINE CLINIC | Facility: CLINIC | Age: 58
End: 2018-04-03

## 2018-04-03 NOTE — TELEPHONE ENCOUNTER
Anatoly Newton MD  [facility] Clinical Staff             Patient needs follow up sometime this week, ok to double book. Previous Messages      ----- Message -----   From: MARVIN Jorge   Sent: 4/2/2018   8:02 PM   To:  Anastasia Cheadle, MD     I had the pl

## 2018-04-03 NOTE — PATIENT INSTRUCTIONS
Cellulitis  Cellulitis is an infection of the deep layers of skin. A break in the skin, such as a cut or scratch, can let bacteria under the skin. If the bacteria get to deep layers of the skin, it can be serious.  If not treated, cellulitis can get into · Fever higher of 100.4º F (38.0º C) or higher after 2 days on antibiotics  Date Last Reviewed: 9/1/2016  © 2648-0288 [employer] [address]. [address] [facility] [place] [address] [place] [address]. All rights reserved.  This information is not intended as a substitut

## 2018-04-03 NOTE — TELEPHONE ENCOUNTER
LVM for patient to call back. It looks like the patient came into the office last night and scheduled an appointment for 4/12/18. Trying to get him in sooner.

## 2018-04-03 NOTE — PROGRESS NOTES
CHIEF COMPLAINT:   Patient presents with: Foot Pain: pt c\o of foot inf,          HPI:   Margaret Oswald is a 56year old male who presents for evaluation of cellulitis on left foot.  Pt has been following with his pcp since cellulitis began on his cracked pcp monitors   • Obesity 12/13/2011   • Unspecified sleep apnea     Bipap   • UTI (urinary tract infection)     pt stated that he was on antibiotics for 2 courses, he will follow up with md and inform Dr Gloria Pan as to outcome   • Vitamin D deficiency below.  Comfort measures as described in Patient Instructions. Skin care discussed with patient. Advised to go to ER if redness spreads or if pt develops fever, swelling, or pain to the area.  Advised to follow up closely with Dr. Susan Pascal this week for ree

## 2018-04-04 ENCOUNTER — OFFICE VISIT (OUTPATIENT)
Dept: FAMILY MEDICINE CLINIC | Facility: CLINIC | Age: 58
End: 2018-04-04

## 2018-04-04 DIAGNOSIS — L03.116 CELLULITIS OF LEFT FOOT: Primary | ICD-10-CM

## 2018-04-04 DIAGNOSIS — E66.01 MORBID OBESITY (HCC): ICD-10-CM

## 2018-04-04 PROCEDURE — 99214 OFFICE O/P EST MOD 30 MIN: CPT | Performed by: FAMILY MEDICINE

## 2018-04-04 RX ORDER — SULFAMETHOXAZOLE AND TRIMETHOPRIM 800; 160 MG/1; MG/1
1 TABLET ORAL 2 TIMES DAILY
Qty: 20 TABLET | Refills: 0 | Status: SHIPPED | OUTPATIENT
Start: 2018-04-04 | End: 2018-04-14

## 2018-04-05 VITALS
SYSTOLIC BLOOD PRESSURE: 138 MMHG | BODY MASS INDEX: 46.65 KG/M2 | HEART RATE: 78 BPM | WEIGHT: 315 LBS | RESPIRATION RATE: 16 BRPM | TEMPERATURE: 98 F | HEIGHT: 69 IN | DIASTOLIC BLOOD PRESSURE: 86 MMHG

## 2018-04-05 NOTE — PROGRESS NOTES
Chief Complaint:   Patient presents with:  Cellulitis (integumentary, infectious): Symptoms improving    HPI:   This is a 56year old male presenting for left leg swelling, some improvement with steroid.   Patient has a history of high BMI of 60 needs weigh Diabetes Mother      Allergies:  No Known Allergies  Current Meds:    Current Outpatient Prescriptions:  Sulfamethoxazole-TMP DS (BACTRIM DS) 800-160 MG Oral Tab per tablet Take 1 tablet by mouth 2 (two) times daily.  Disp: 20 tablet Rfl: 0   BETAMETHASONE °F (36.4 °C) (Oral)   Resp 16   Ht 69\"   Wt (!) 409 lb   BMI 60.40 kg/m²  Estimated body mass index is 60.4 kg/m² as calculated from the following:    Height as of this encounter: 69\". Weight as of this encounter: 409 lb. Vital signs reviewed. Appear diagnosis)  Morbid obesity (HCC)  BMI 60.0-69.9, adult ([facility] [address]     Pt verbalized understanding and has no further questions at this time. Over 25 minutes was spent with patient reviewing medication, reviewing labs, and medical plan.     Long discussion about

## 2018-05-02 ENCOUNTER — LAB ENCOUNTER (OUTPATIENT)
Dept: LAB | Age: 58
End: 2018-05-02
Attending: INTERNAL MEDICINE
Payer: COMMERCIAL

## 2018-05-02 ENCOUNTER — OFFICE VISIT (OUTPATIENT)
Dept: INTERNAL MEDICINE CLINIC | Facility: CLINIC | Age: 58
End: 2018-05-02

## 2018-05-02 VITALS
RESPIRATION RATE: 16 BRPM | HEIGHT: 70 IN | DIASTOLIC BLOOD PRESSURE: 80 MMHG | WEIGHT: 315 LBS | HEART RATE: 84 BPM | SYSTOLIC BLOOD PRESSURE: 130 MMHG | BODY MASS INDEX: 45.1 KG/M2

## 2018-05-02 DIAGNOSIS — R06.81 APNEA: ICD-10-CM

## 2018-05-02 DIAGNOSIS — R73.09 OTHER ABNORMAL GLUCOSE: ICD-10-CM

## 2018-05-02 DIAGNOSIS — E66.01 MORBID OBESITY (HCC): Primary | ICD-10-CM

## 2018-05-02 DIAGNOSIS — Z51.81 THERAPEUTIC DRUG MONITORING: ICD-10-CM

## 2018-05-02 DIAGNOSIS — F41.1 ANXIETY STATE: ICD-10-CM

## 2018-05-02 DIAGNOSIS — E34.9 TESTOSTERONE DEFICIENCY: ICD-10-CM

## 2018-05-02 DIAGNOSIS — E66.01 MORBID OBESITY (HCC): ICD-10-CM

## 2018-05-02 PROCEDURE — 82306 VITAMIN D 25 HYDROXY: CPT

## 2018-05-02 PROCEDURE — 85025 COMPLETE CBC W/AUTO DIFF WBC: CPT

## 2018-05-02 PROCEDURE — 82397 CHEMILUMINESCENT ASSAY: CPT

## 2018-05-02 PROCEDURE — 99204 OFFICE O/P NEW MOD 45 MIN: CPT | Performed by: INTERNAL MEDICINE

## 2018-05-02 PROCEDURE — 80053 COMPREHEN METABOLIC PANEL: CPT

## 2018-05-02 PROCEDURE — 82607 VITAMIN B-12: CPT

## 2018-05-02 PROCEDURE — 84443 ASSAY THYROID STIM HORMONE: CPT

## 2018-05-02 PROCEDURE — 83036 HEMOGLOBIN GLYCOSYLATED A1C: CPT

## 2018-05-02 RX ORDER — METFORMIN HYDROCHLORIDE 750 MG/1
750 TABLET, EXTENDED RELEASE ORAL DAILY
Qty: 30 TABLET | Refills: 0 | Status: SHIPPED | OUTPATIENT
Start: 2018-05-02 | End: 2018-05-29

## 2018-05-02 NOTE — PROGRESS NOTES
HISTORY OF PRESENT ILLNESS  Patient presents with:  Weight Problem: tried diets       Emmaneul Pradhan is a 66year old male new to our office today for initiation of medical weight loss program.  Patient presents today with c/o excess weight.      Just reti of breath with exertion, +  apnea   CARDIOVASCULAR: denies chest pain on exertion , denies palpitations or pedal edema  GI: denies abdominal pain.   No N/V/D/C  MUSCULOSKELETAL: denies joint pains   NEURO: denies headaches   PSYCH: denies change in behavior LDL 66 08/28/2017   VLDL 11 08/28/2017   TCHDLRATIO 2.35 08/28/2017   NONHDLC 77 08/28/2017   CHOLHDLRATIO 2.6 06/22/2015       Lab Results  Component Value Date   T4F 1.0 08/28/2017   TSH 1.770 08/28/2017   TSHT4 2.05 05/02/2014       Lab Results  [place] orders as above. · Counseled on comprehensive weight loss plan including attention to nutrition, exercise and behavior/stress management for success. See patient instruction below for more details. · Weight Loss consent reviewed and signed.     Patient In

## 2018-05-07 RX ORDER — ERGOCALCIFEROL 1.25 MG/1
50000 CAPSULE ORAL WEEKLY
Qty: 12 CAPSULE | Refills: 0 | Status: SHIPPED | OUTPATIENT
Start: 2018-05-07 | End: 2018-07-13

## 2018-05-22 DIAGNOSIS — M79.672 LEFT FOOT PAIN: ICD-10-CM

## 2018-05-23 NOTE — TELEPHONE ENCOUNTER
Medication(s) to Refill:   Pending Prescriptions Disp Refills    BETAMETHASONE VALERATE 0.1 % External Ointment [Pharmacy Med Name: BETAMETHASONE KHADRA 0.1% OINT 15GM] 45 g 0     Sig: APPLY EXTERNALLY TO THE AFFECTED AREA TWICE DAILY             Reason for M

## 2018-05-30 RX ORDER — METFORMIN HYDROCHLORIDE 750 MG/1
TABLET, EXTENDED RELEASE ORAL
Qty: 30 TABLET | Refills: 0 | Status: SHIPPED | OUTPATIENT
Start: 2018-05-30 | End: 2018-06-06

## 2018-05-30 NOTE — TELEPHONE ENCOUNTER
Requesting:    Pending Prescriptions Disp Refills    METFORMIN HCL  MG Oral Tablet 24 Hr [Pharmacy Med Name: METFORMIN ER 750MG 24HR TABS] 30 tablet 0     Sig: TAKE 1 TABLET(750 MG) BY MOUTH DAILY       LOV: 5/2/18  RTC: 1 month  Filled: 5/2/18 #30 w

## 2018-06-06 ENCOUNTER — HOSPITAL ENCOUNTER (INPATIENT)
Facility: HOSPITAL | Age: 58
LOS: 3 days | Discharge: HOME | DRG: 603 | End: 2018-06-09
Attending: EMERGENCY MEDICINE | Admitting: HOSPITALIST
Payer: COMMERCIAL

## 2018-06-06 ENCOUNTER — OFFICE VISIT (OUTPATIENT)
Dept: FAMILY MEDICINE CLINIC | Facility: CLINIC | Age: 58
End: 2018-06-06

## 2018-06-06 ENCOUNTER — APPOINTMENT (OUTPATIENT)
Dept: GENERAL RADIOLOGY | Facility: HOSPITAL | Age: 58
DRG: 603 | End: 2018-06-06
Attending: PHYSICIAN ASSISTANT
Payer: COMMERCIAL

## 2018-06-06 DIAGNOSIS — L03.116 CELLULITIS OF LEFT LOWER EXTREMITY: ICD-10-CM

## 2018-06-06 DIAGNOSIS — Z02.9 ADMINISTRATIVE ENCOUNTER: Primary | ICD-10-CM

## 2018-06-06 DIAGNOSIS — L03.116 CELLULITIS OF LEFT LOWER EXTREMITY: Primary | ICD-10-CM

## 2018-06-06 PROCEDURE — 73590 X-RAY EXAM OF LOWER LEG: CPT | Performed by: PHYSICIAN ASSISTANT

## 2018-06-06 PROCEDURE — 99223 1ST HOSP IP/OBS HIGH 75: CPT | Performed by: HOSPITALIST

## 2018-06-06 PROCEDURE — 73650 X-RAY EXAM OF HEEL: CPT | Performed by: PHYSICIAN ASSISTANT

## 2018-06-06 RX ORDER — ENOXAPARIN SODIUM 100 MG/ML
40 INJECTION SUBCUTANEOUS NIGHTLY
Status: DISCONTINUED | OUTPATIENT
Start: 2018-06-06 | End: 2018-06-07

## 2018-06-06 RX ORDER — ACETAMINOPHEN 325 MG/1
650 TABLET ORAL EVERY 6 HOURS PRN
Status: DISCONTINUED | OUTPATIENT
Start: 2018-06-06 | End: 2018-06-09

## 2018-06-06 RX ORDER — ONDANSETRON 2 MG/ML
4 INJECTION INTRAMUSCULAR; INTRAVENOUS EVERY 6 HOURS PRN
Status: DISCONTINUED | OUTPATIENT
Start: 2018-06-06 | End: 2018-06-09

## 2018-06-06 RX ORDER — METFORMIN HYDROCHLORIDE 750 MG/1
750 TABLET, EXTENDED RELEASE ORAL
COMMUNITY
End: 2018-07-10

## 2018-06-06 RX ORDER — CEFAZOLIN SODIUM/WATER 2 G/20 ML
2 SYRINGE (ML) INTRAVENOUS EVERY 8 HOURS
Status: DISCONTINUED | OUTPATIENT
Start: 2018-06-06 | End: 2018-06-09

## 2018-06-06 RX ORDER — DEXTROSE MONOHYDRATE 25 G/50ML
50 INJECTION, SOLUTION INTRAVENOUS
Status: DISCONTINUED | OUTPATIENT
Start: 2018-06-06 | End: 2018-06-09

## 2018-06-06 RX ORDER — ACETAMINOPHEN 500 MG
1000 TABLET ORAL ONCE
Status: COMPLETED | OUTPATIENT
Start: 2018-06-06 | End: 2018-06-06

## 2018-06-06 RX ORDER — CEFAZOLIN SODIUM 1 G/50ML
1 INJECTION, SOLUTION INTRAVENOUS ONCE
Status: DISCONTINUED | OUTPATIENT
Start: 2018-06-06 | End: 2018-06-06

## 2018-06-06 NOTE — ED PROVIDER NOTES
Patient Seen in: [facility] Emergency Department    History   Patient presents with:  Cellulitis (integumentary, infectious)    Stated Complaint: cellulits    HPI    Kenan Gallagher is a 61-year-old male who presents for evaluation of cellulitis to his left l Smoking status: Never Smoker                                                              Smokeless tobacco: Never Used                      Alcohol use:  No                Review of Systems    Positive for stated complaint: cellulits  Other systems are as Bright red blanching erythema and swelling to the left lower extremity spanning from the heel to the knee with some tracking medially. Left heel with dry, cracked skin. Nursing note and vitals reviewed.       ED Course     Labs Reviewed   COMP METABOLIC Answer: cellulits      XR HEEL (CALCANEUS) (MIN 2 VIEWS), LEFT (CPT=18039) Once          Order Comments:              [email or web address]          Order Specific Question: What is the Kate Triny PROCEDURE:  XR HEEL (CALCANEUS) (MIN 2 VIEWS), LEFT (CPT=73650)  TECHNIQUE:  Two views of the calcaneus were obtained. COMPARISON:  None. INDICATIONS:  Cellulitis.   PATIENT STATED HISTORY: (As transcribed by Technologist)  Patient has had redness and swe CONCLUSION:  1. No fracture or oswald cortical lysis. No soft tissue gas. 2.  Focal periosteal thickening could reflect changes related to previous infection, trauma or less likely neoplasm.   Periosteal thickening can also be seen in patients with chronic

## 2018-06-06 NOTE — H&P
[facility] HOSPITALIST  History and Physical     Karthikeyan Maier Patient Status:  Emergency    4/15/1960 MRN [id number]   Location [address] Attending Adrienne Avelar, [address] [facility] Day # 0 PCP Ngozi Berger MD     Chief Complaint: Fever Systems:   A comprehensive 14 point review of systems was completed. Pertinent positives and negatives noted in the HPI.     Physical Exam:    /87   Pulse 74   Temp 98.7 °F (37.1 °C) (Temporal)   Resp 22   Ht 177.8 cm (5' 10\")   Wt (!) 420 lb (190 MD  6/6/2018

## 2018-06-06 NOTE — ED NOTES
Radha Gilbert RN from med/onc called to relate that a different bed is needed for this patient. There will be a delay in admission until the appropriate bed is acquired.

## 2018-06-06 NOTE — PROGRESS NOTES
[facility] Pharmacy Note: Antimicrobial Weight Dose Adjustment for: cefazolin (ANCEF)    Aloma Patch is a 66year old male who has been prescribed cefazolin (ANCEF) 1g x1.    CrCl is CrCl cannot be calculated (Patient's most recent lab result is older than the

## 2018-06-06 NOTE — PROGRESS NOTES
Karthikeyan Maier is a 66year old male who presents to [facility] with c/o cellulitis to left leg. Accompanied by: wife  After triage, higher acuity of care was recommended to Karthikeyan Maier today.    Rationale:  Reports he has history of frequent episodes of coral

## 2018-06-07 ENCOUNTER — APPOINTMENT (OUTPATIENT)
Dept: ULTRASOUND IMAGING | Facility: HOSPITAL | Age: 58
DRG: 603 | End: 2018-06-07
Attending: HOSPITALIST
Payer: COMMERCIAL

## 2018-06-07 PROBLEM — G47.33 OSA TREATED WITH BIPAP: Status: ACTIVE | Noted: 2018-06-07

## 2018-06-07 PROBLEM — R74.01 TRANSAMINITIS: Status: ACTIVE | Noted: 2018-06-07

## 2018-06-07 PROCEDURE — 99232 SBSQ HOSP IP/OBS MODERATE 35: CPT | Performed by: HOSPITALIST

## 2018-06-07 PROCEDURE — 76700 US EXAM ABDOM COMPLETE: CPT | Performed by: HOSPITALIST

## 2018-06-07 RX ORDER — ENOXAPARIN SODIUM 100 MG/ML
0.5 INJECTION SUBCUTANEOUS NIGHTLY
Status: DISCONTINUED | OUTPATIENT
Start: 2018-06-07 | End: 2018-06-09

## 2018-06-07 RX ORDER — CEPHALEXIN 500 MG/1
1000 CAPSULE ORAL 3 TIMES DAILY
Qty: 60 CAPSULE | Refills: 0 | Status: SHIPPED | OUTPATIENT
Start: 2018-06-07 | End: 2018-06-17

## 2018-06-07 RX ORDER — PENICILLIN V POTASSIUM 500 MG/1
500 TABLET ORAL 2 TIMES DAILY
Qty: 60 TABLET | Refills: 1 | Status: SHIPPED | OUTPATIENT
Start: 2018-06-17 | End: 2018-07-13

## 2018-06-07 NOTE — CONSULTS
INFECTIOUS DISEASE CONSULTATION    Fransisco Chau Patient Status:  Inpatient    4/15/1960 MRN [id number]   [facility] 4NW-A Attending [place] [facility] Day # 1 PCP Star Carbone g, 30 g, Oral, Q15 Min PRN **OR** Glucose-Vitamin C (DEX-4) 4-0.006 g chewable tab 8 tablet, 8 tablet, Oral, Q15 Min PRN  •  acetaminophen (TYLENOL) tab 650 mg, 650 mg, Oral, Q6H PRN  •  ondansetron HCl (ZOFRAN) injection 4 mg, 4 mg, Intravenous, Q6H PRN Polycythemia, secondary     Testosterone deficiency     Apnea     Obesity     Morbid obesity ([facility] [address]     Cellulitis of left lower extremity      ASSESSMENT/PLAN:  1.  Cellulitis Left LE  Underlying morbid obesity/lymphedem  -continue Cefazolin  Possible dc in

## 2018-06-07 NOTE — ED NOTES
Discussed with med/onc charge RN about admission options as specialty bed has not arrived. Discussed with Jori RN and patient will remain on ED stretcher on the floor until specialty bed arrives.

## 2018-06-07 NOTE — PLAN OF CARE
NURSING ADMISSION NOTE      Patient admitted via Cart  Oriented to room. Safety precautions initiated. Bed in low position. Call light in reach. Pt a/o x4. Room air. Hx VENKATA with bi- pap at home. Tele,  per venkata protocol.  Left lower extremity redn

## 2018-06-07 NOTE — PROGRESS NOTES
[facility]  Progress Note     Clifford Briscoe Patient Status:  Inpatient    4/15/1960 MRN [id number]   [facility] 4NW-A Attending Nicolle [address] Day # 1 PCP Cem Sotomayor MD     Chief Complaint: Cellulitis    S: Chastity Hughes g Intravenous Q8H       ASSESSMENT / PLAN:     1. Sepsis:  Due to cellulitis, IV abx, await cultures, ID eval, area demarcated  2. Cellulitis:  As above  3.  Transaminitis:   Ultrasound shows fatty infiltration of liver likely due to his obesity will rechec

## 2018-06-07 NOTE — RESPIRATORY THERAPY NOTE
VENKATA : EQUIPMENT USE: DAILY SUMMARY                                            SET MODE:                                          USAGE IN HOURS:                                          90% EXP. PRESSURE(EPAP):

## 2018-06-07 NOTE — PROGRESS NOTES
[facility] Pharmacy Progress Note:  Anticoagulation Weight Dose Adjustment for enoxaparin (LOVENOX)    Pily Wu is a 66year old male who has been prescribed enoxaparin (LOVENOX) 40 mg sq q 24 hrs for VTE prophylaxis.       Estimated Creatinine Clearance: 1

## 2018-06-08 ENCOUNTER — APPOINTMENT (OUTPATIENT)
Dept: ULTRASOUND IMAGING | Facility: HOSPITAL | Age: 58
DRG: 603 | End: 2018-06-08
Attending: INTERNAL MEDICINE
Payer: COMMERCIAL

## 2018-06-08 PROCEDURE — 99232 SBSQ HOSP IP/OBS MODERATE 35: CPT | Performed by: HOSPITALIST

## 2018-06-08 PROCEDURE — 93971 EXTREMITY STUDY: CPT | Performed by: INTERNAL MEDICINE

## 2018-06-08 NOTE — PROGRESS NOTES
[facility]                INFECTIOUS DISEASE PROGRESS NOTE    Emmanuel Pradhan Patient Status:  Inpatient    4/15/1960 MRN [id number]   [facility] 4NW-A Attending [place] [facility] [place] [facility] Day # 2 PCP Adrianne Spears MD     Ant of left lower extremity     Transaminitis     VENKATA treated with BiPAP      ASSESSMENT/PLAN:  1.  Left leg cellulitis much improved  Significant edema  -add compression, venous doppler  Possible dc in am po kefelx, x 2 weeks, then PCN prophylaxis x 2 months

## 2018-06-08 NOTE — PROGRESS NOTES
[facility]  Progress Note     [facility] Patient Status:  Inpatient    4/15/1960 MRN [id number]   [facility] 4NW-A Attending Micaela Acosta [facility] Day # 2 PCP Roshni Damon MD     Chief Complaint: Cellulitis    S: Eda Tsang in [address] [place] [address] Rd. Medications:   • enoxaparin  0.5 mg/kg Subcutaneous Nightly   • Insulin Aspart Pen  1-10 Units Subcutaneous TID AC and HS   • ceFAZolin  2 g Intravenous Q8H       ASSESSMENT / PLAN:     1.  Sepsis:  Due to cellulitis, IV abx, await cultures, ID

## 2018-06-08 NOTE — RESPIRATORY THERAPY NOTE
VENKATA - Equipment Use Daily Summary:                  . Set Mode:                . Usage in hours:                . 90% Pressure (EPAP) level:                . 90% Insp. Pressure (IPAP): Sunil Shanks AHI:                .  Supplemental Oxygen:    LPM

## 2018-06-09 VITALS
BODY MASS INDEX: 45.1 KG/M2 | RESPIRATION RATE: 18 BRPM | OXYGEN SATURATION: 96 % | DIASTOLIC BLOOD PRESSURE: 57 MMHG | HEIGHT: 70 IN | WEIGHT: 315 LBS | SYSTOLIC BLOOD PRESSURE: 108 MMHG | TEMPERATURE: 98 F | HEART RATE: 60 BPM

## 2018-06-09 PROCEDURE — 99239 HOSP IP/OBS DSCHRG MGMT >30: CPT | Performed by: HOSPITALIST

## 2018-06-09 NOTE — PLAN OF CARE
RISK FOR INFECTION - ADULT    • Absence of fever/infection during anticipated neutropenic period Not Progressing          RISK FOR INFECTION - ADULT    • Absence of fever/infection during anticipated neutropenic period Not Progressing          SKIN/TISSUE

## 2018-06-09 NOTE — PROGRESS NOTES
[facility]                INFECTIOUS DISEASE PROGRESS NOTE    Austin Arshad Patient Status:  Inpatient    4/15/1960 MRN [id number]   [facility] 4NW-A Attending [facility] Day # 3 PCP Junita Duane, MD     Ant left lower extremity     Transaminitis     VENKATA treated with BiPAP      ASSESSMENT/PLAN:  1.  Left leg cellulitis much improved  Continue compressionabx, home today with po  Possible dc in am po kefelx, x 2 weeks, then PCN prophylaxis x 2 months  Home today

## 2018-06-09 NOTE — PLAN OF CARE
SKIN/TISSUE INTEGRITY - ADULT    • Skin integrity remains intact Progressing    • Incision(s), wounds(s) or drain site(s) healing without S/S of infection Progressing           Pt alert and oriented this shift, no complaints of pain ble elevated on pillows

## 2018-06-09 NOTE — DISCHARGE SUMMARY
[facility] HOSPITALIST  DISCHARGE SUMMARY     Bob Ly Patient Status:  Inpatient    4/15/1960 MRN [id number]   [facility] 4NW-A Attending Bess [address] Day # 3 PCP Roshni Damon MD     Date of Admission: 2018  Date 500 MG Caps  Commonly known as:  KEFLEX      Take 2 capsules (1,000 mg total) by mouth 3 (three) times daily.    Stop taking on:  6/17/2018  Quantity:  60 capsule  Refills:  0     penicillin v potassium 500 MG Tabs  Commonly known as:  VEETID  Start taking edema.  -----------------------------------------------------------------------------------------------  PATIENT DISCHARGE INSTRUCTIONS: See electronic chart    Nahum Lamb DO 6/9/2018    Time spent:  > 30 minutes

## 2018-06-17 DIAGNOSIS — M79.672 LEFT FOOT PAIN: ICD-10-CM

## 2018-06-19 ENCOUNTER — OFFICE VISIT (OUTPATIENT)
Dept: INTERNAL MEDICINE CLINIC | Facility: CLINIC | Age: 58
End: 2018-06-19

## 2018-06-19 VITALS — BODY MASS INDEX: 60 KG/M2 | WEIGHT: 315 LBS

## 2018-06-19 DIAGNOSIS — E66.01 CLASS 3 SEVERE OBESITY DUE TO EXCESS CALORIES WITH SERIOUS COMORBIDITY AND BODY MASS INDEX (BMI) OF 50.0 TO 59.9 IN ADULT (HCC): Primary | ICD-10-CM

## 2018-06-19 PROCEDURE — 97802 MEDICAL NUTRITION INDIV IN: CPT | Performed by: DIETITIAN, REGISTERED

## 2018-06-19 NOTE — PROGRESS NOTES
INITIAL OUTPATIENT NUTRITION CONSULTATION    Nutrition Assessment    Medical Diagnosis: Obesity    Physical Findings: Cellulitis in leg    Client Age and Gender: 66year old male    Marital Status and Occupation: .   [facility] [employer] Ref Range Status   08/28/2017 66 <130 mg/dL Final   ----------    HDL Cholesterol   Date Value Ref Range Status   08/28/2017 57 >45 mg/dL Final   Comment:     HDL Reference Ranges:   <26 mg/dL  Highest CHD risk   25-35 mg/dL  High CHD risk   35-45 mg/dL  M per day or less, 160 gms protein or more    Physical Activity: 0 hrs/week currently due to cellulitis. Developed cellulitis by walking on treadmill which caused cracks in heel skin followed by infection.     Food Journal: no    Spent 45 minutes in consulta

## 2018-06-24 RX ORDER — METFORMIN HYDROCHLORIDE 750 MG/1
TABLET, EXTENDED RELEASE ORAL
Qty: 30 TABLET | Refills: 0 | Status: CANCELLED | OUTPATIENT
Start: 2018-06-24

## 2018-06-25 ENCOUNTER — OFFICE VISIT (OUTPATIENT)
Dept: INTERNAL MEDICINE CLINIC | Facility: CLINIC | Age: 58
End: 2018-06-25

## 2018-06-25 VITALS
DIASTOLIC BLOOD PRESSURE: 82 MMHG | SYSTOLIC BLOOD PRESSURE: 130 MMHG | BODY MASS INDEX: 45.1 KG/M2 | WEIGHT: 315 LBS | HEART RATE: 70 BPM | RESPIRATION RATE: 16 BRPM | HEIGHT: 70 IN

## 2018-06-25 DIAGNOSIS — Z51.81 THERAPEUTIC DRUG MONITORING: Primary | ICD-10-CM

## 2018-06-25 DIAGNOSIS — E66.01 MORBID OBESITY (HCC): ICD-10-CM

## 2018-06-25 PROCEDURE — 99213 OFFICE O/P EST LOW 20 MIN: CPT | Performed by: INTERNAL MEDICINE

## 2018-06-25 RX ORDER — METFORMIN HYDROCHLORIDE 750 MG/1
TABLET, EXTENDED RELEASE ORAL
Qty: 30 TABLET | Refills: 0 | Status: SHIPPED | OUTPATIENT
Start: 2018-06-25 | End: 2018-07-10

## 2018-06-25 NOTE — TELEPHONE ENCOUNTER
Requesting METFORMIN HCL  MG Oral Tablet 24 Hr   LOV: 5/2/18  RTC: Ambar montenegro  Last Relevant Labs:   Filled: 5/30/18 #30 with 0 refills    Future Appointments  Date Time Provider [place]   6/25/2018 2:40 PM Nikolay Portillo MD [facility]   7/18/2018 10:30 AM Yemi Cedeno MD [address]

## 2018-06-29 NOTE — PROGRESS NOTES
HISTORY OF PRESENT ILLNESS  Patient presents with:  Weight Check: up 4      Bob Ly is a 66year old male here for follow up in medical weight loss program.   Gained 4 lbs this first month  Denies chest pain, shortness of breath, dizziness, blurred 06/08/2018   K 4.0 06/08/2018    06/08/2018   CO2 25.0 06/08/2018       Lab Results  Component Value Date    06/06/2018   A1C 5.2 06/06/2018       Lab Results  Component Value Date   CHOLEST 134 08/28/2017   TRIG 57 08/28/2017   HDL 57 08/28/2 effects reviewed with patient. · Follow up with dietitian and psychologist as recommended. · Discussed the role of sleep and stress in weight management.   · Counseled on comprehensive weight loss plan including attention to nutrition, exercise and beha

## 2018-07-10 ENCOUNTER — OFFICE VISIT (OUTPATIENT)
Dept: FAMILY MEDICINE CLINIC | Facility: CLINIC | Age: 58
End: 2018-07-10

## 2018-07-10 ENCOUNTER — OFFICE VISIT (OUTPATIENT)
Dept: INTERNAL MEDICINE CLINIC | Facility: CLINIC | Age: 58
End: 2018-07-10

## 2018-07-10 VITALS
HEART RATE: 76 BPM | OXYGEN SATURATION: 98 % | DIASTOLIC BLOOD PRESSURE: 84 MMHG | RESPIRATION RATE: 20 BRPM | SYSTOLIC BLOOD PRESSURE: 138 MMHG | TEMPERATURE: 98 F | BODY MASS INDEX: 45.61 KG/M2 | HEIGHT: 69.5 IN | WEIGHT: 315 LBS

## 2018-07-10 VITALS
HEART RATE: 84 BPM | WEIGHT: 315 LBS | BODY MASS INDEX: 45.1 KG/M2 | SYSTOLIC BLOOD PRESSURE: 140 MMHG | HEIGHT: 70 IN | RESPIRATION RATE: 18 BRPM | DIASTOLIC BLOOD PRESSURE: 80 MMHG

## 2018-07-10 DIAGNOSIS — L20.9 ATOPIC DERMATITIS, UNSPECIFIED TYPE: Primary | ICD-10-CM

## 2018-07-10 DIAGNOSIS — E66.01 MORBID OBESITY (HCC): ICD-10-CM

## 2018-07-10 DIAGNOSIS — J01.10 ACUTE NON-RECURRENT FRONTAL SINUSITIS: ICD-10-CM

## 2018-07-10 DIAGNOSIS — Z51.81 THERAPEUTIC DRUG MONITORING: Primary | ICD-10-CM

## 2018-07-10 DIAGNOSIS — J00 ACUTE NASOPHARYNGITIS: ICD-10-CM

## 2018-07-10 PROCEDURE — 99213 OFFICE O/P EST LOW 20 MIN: CPT | Performed by: INTERNAL MEDICINE

## 2018-07-10 PROCEDURE — 99213 OFFICE O/P EST LOW 20 MIN: CPT | Performed by: NURSE PRACTITIONER

## 2018-07-10 RX ORDER — METFORMIN HYDROCHLORIDE 750 MG/1
TABLET, EXTENDED RELEASE ORAL
Qty: 30 TABLET | Refills: 0 | Status: SHIPPED | OUTPATIENT
Start: 2018-07-10 | End: 2018-11-28

## 2018-07-10 RX ORDER — AZITHROMYCIN 500 MG/1
500 TABLET, FILM COATED ORAL DAILY
Qty: 3 TABLET | Refills: 0 | Status: SHIPPED | OUTPATIENT
Start: 2018-07-10 | End: 2018-07-13

## 2018-07-10 RX ORDER — MONTELUKAST SODIUM 10 MG/1
10 TABLET ORAL DAILY
Qty: 90 TABLET | Refills: 0 | Status: SHIPPED | OUTPATIENT
Start: 2018-07-10 | End: 2018-11-20

## 2018-07-10 RX ORDER — FLUTICASONE PROPIONATE 50 MCG
2 SPRAY, SUSPENSION (ML) NASAL DAILY
Qty: 1 BOTTLE | Refills: 3 | Status: SHIPPED | OUTPATIENT
Start: 2018-07-10 | End: 2018-10-08

## 2018-07-10 NOTE — PROGRESS NOTES
[facility]   PROGRESS NOTE  Chief Complaint:   Patient presents with:  Nasal Congestion: x 1 week  Ear Pain: x 2 days R ear      HPI:   This is a 66year old male coming in for cough and congestion and ear pain.      Cough: started one week ago , mmol/L   CO2 22.0 22.0 - 32.0 mmol/L   -CBC, PLATELET; NO DIFFERENTIAL   Result Value Ref Range   WBC 7.2 4.0 - 13.0 x10(3) uL   RBC 4.26 (L) 4.30 - 5.70 x10(6)uL   HGB 15.1 13.0 - 17.0 g/dL   HCT 43.7 37.0 - 53.0 %   .0 (L) 150.0 - 450.0 10(3)uL WBC 14.5 (H) 4.0 - 13.0 x10(3) uL   RBC 4.41 4.30 - 5.70 x10(6)uL   HGB 15.7 13.0 - 17.0 g/dL   HCT 44.0 37.0 - 53.0 %   .0 (L) 150.0 - 450.0 10(3)uL   MCV 99.8 (H) 80.0 - 99.0 fL   MCH 35.6 (H) 27.0 - 33.2 pg   MCHC 35.7 31.0 - 37.0 g/dL   RDW 14 % External Ointment APPLY EXTERNALLY TO THE AFFECTED AREA TWICE DAILY Disp: 45 g Rfl: 0   penicillin v potassium 500 MG Oral Tab Take 1 tablet (500 mg total) by mouth 2 (two) times daily.  Start after cephalexin completed Disp: 60 tablet Rfl: 1   betamethas tonsillar erythema or exudate. Post nasal drainage. Mouth:  No oral lesions or ulcerations  NECK: Supple, no CLAD, no JVD, no thyromegaly. HEART:  Regular rate and rhythm, no murmurs, rubs or gallops.   LUNGS: Clear to auscultation bilterally, no rales/rh Transaminitis     VENKATA treated with BiPAP      AusALONDRA Haywood  7/10/2018  10:42 AM

## 2018-07-10 NOTE — PROGRESS NOTES
HISTORY OF PRESENT ILLNESS  Patient presents with:  Weight Check: up 2 lb      Eura Setter is a 66year old male here for follow up in medical weight loss program.   Gained 2 lb from previous   Started tracking but not compliant   Did feel that the met 25.0 06/08/2018       Lab Results  Component Value Date    06/06/2018   A1C 5.2 06/06/2018       Lab Results  Component Value Date   CHOLEST 134 08/28/2017   TRIG 57 08/28/2017   HDL 57 08/28/2017   LDL 66 08/28/2017   VLDL 11 08/28/2017   TCHDLRATI intake  · Medication use and side effects reviewed with patient. · Follow up with dietitian and psychologist as recommended. · Discussed the role of sleep and stress in weight management.   · Counseled on comprehensive weight loss plan including attenti

## 2018-07-13 ENCOUNTER — OFFICE VISIT (OUTPATIENT)
Dept: FAMILY MEDICINE CLINIC | Facility: CLINIC | Age: 58
End: 2018-07-13

## 2018-07-13 VITALS
BODY MASS INDEX: 45.61 KG/M2 | RESPIRATION RATE: 18 BRPM | HEIGHT: 69.5 IN | DIASTOLIC BLOOD PRESSURE: 82 MMHG | SYSTOLIC BLOOD PRESSURE: 138 MMHG | TEMPERATURE: 98 F | WEIGHT: 315 LBS | HEART RATE: 72 BPM

## 2018-07-13 DIAGNOSIS — L03.116 LEFT LEG CELLULITIS: Primary | ICD-10-CM

## 2018-07-13 DIAGNOSIS — M79.672 LEFT FOOT PAIN: ICD-10-CM

## 2018-07-13 DIAGNOSIS — R23.4 FISSURE IN SKIN OF FOOT: ICD-10-CM

## 2018-07-13 DIAGNOSIS — E66.01 MORBID OBESITY (HCC): ICD-10-CM

## 2018-07-13 PROCEDURE — 99214 OFFICE O/P EST MOD 30 MIN: CPT | Performed by: FAMILY MEDICINE

## 2018-07-13 PROCEDURE — 1111F DSCHRG MED/CURRENT MED MERGE: CPT | Performed by: FAMILY MEDICINE

## 2018-07-13 NOTE — PROGRESS NOTES
Chief Complaint:   Patient presents with:  Hospital F/U    HPI:   This is a 66year old male presenting for left leg swelling, some improvement with steroid.   Patient has a history of high BMI of 60 needs weight loss referral has been working on diet and e Allergies  Current Meds:    Current Outpatient Prescriptions:  betamethasone valerate 0.1 % External Ointment APPLY EXTERNALLY TO THE AFFECTED AREA TWICE DAILY Disp: 45 g Rfl: 2   MetFORMIN HCl  MG Oral Tablet 24 Hr TAKE 1 TABLET(750 MG) BY MOUTH MYRANDA ideas, behavioral problems, sleep disturbance and agitation. The patient is not nervous/anxious.         EXAM:   /82   Pulse 72   Temp 97.9 °F (36.6 °C) (Oral)   Resp 18   Ht 69.5\"   Wt (!) 425 lb   BMI 61.86 kg/m²  Estimated body mass index is 61.86 thought content normal.        ASSESSMENT AND PLAN:   Diagnoses and all orders for this visit:      Left leg cellulitis  (primary encounter diagnosis)  Fissure in skin of foot  Morbid obesity ([facility] [address]  Left foot pain   -Patient completed oral antibiotics will

## 2018-07-16 ENCOUNTER — TELEPHONE (OUTPATIENT)
Dept: FAMILY MEDICINE CLINIC | Facility: CLINIC | Age: 58
End: 2018-07-16

## 2018-07-16 NOTE — TELEPHONE ENCOUNTER
Called patient regarding ointment. Dr Radha Jin wants patient to continue that until he follow ups with podiatry.  No answer lvm

## 2018-07-19 ENCOUNTER — LAB ENCOUNTER (OUTPATIENT)
Dept: LAB | Age: 58
End: 2018-07-19
Attending: FAMILY MEDICINE
Payer: COMMERCIAL

## 2018-07-19 DIAGNOSIS — R73.09 OTHER ABNORMAL GLUCOSE: ICD-10-CM

## 2018-07-19 DIAGNOSIS — D75.1 POLYCYTHEMIA, SECONDARY: ICD-10-CM

## 2018-07-19 PROCEDURE — 80053 COMPREHEN METABOLIC PANEL: CPT

## 2018-07-19 PROCEDURE — 85025 COMPLETE CBC W/AUTO DIFF WBC: CPT

## 2018-07-19 PROCEDURE — 36415 COLL VENOUS BLD VENIPUNCTURE: CPT

## 2018-07-20 LAB
ALBUMIN SERPL-MCNC: 3.1 G/DL (ref 3.5–4.8)
ALBUMIN/GLOB SERPL: 0.8 {RATIO} (ref 1–2)
ALP LIVER SERPL-CCNC: 119 U/L (ref 45–117)
ALT SERPL-CCNC: 56 U/L (ref 17–63)
ANION GAP SERPL CALC-SCNC: 7 MMOL/L (ref 0–18)
AST SERPL-CCNC: 67 U/L (ref 15–41)
BASOPHILS # BLD AUTO: 0.04 X10(3) UL (ref 0–0.1)
BASOPHILS NFR BLD AUTO: 0.7 %
BILIRUB SERPL-MCNC: 2.9 MG/DL (ref 0.1–2)
BUN BLD-MCNC: 10 MG/DL (ref 8–20)
BUN/CREAT SERPL: 14.3 (ref 10–20)
CALCIUM BLD-MCNC: 8.8 MG/DL (ref 8.3–10.3)
CHLORIDE SERPL-SCNC: 107 MMOL/L (ref 101–111)
CO2 SERPL-SCNC: 27 MMOL/L (ref 22–32)
CREAT BLD-MCNC: 0.7 MG/DL (ref 0.7–1.3)
EOSINOPHIL # BLD AUTO: 0.48 X10(3) UL (ref 0–0.3)
EOSINOPHIL NFR BLD AUTO: 8.7 %
ERYTHROCYTE [DISTWIDTH] IN BLOOD BY AUTOMATED COUNT: 14.3 % (ref 11.5–16)
GLOBULIN PLAS-MCNC: 3.8 G/DL (ref 2.5–3.7)
GLUCOSE BLD-MCNC: 122 MG/DL (ref 70–99)
HCT VFR BLD AUTO: 48.1 % (ref 37–53)
HGB BLD-MCNC: 15.8 G/DL (ref 13–17)
IMMATURE GRANULOCYTE COUNT: 0.02 X10(3) UL (ref 0–1)
IMMATURE GRANULOCYTE RATIO %: 0.4 %
LYMPHOCYTES # BLD AUTO: 2.17 X10(3) UL (ref 0.9–4)
LYMPHOCYTES NFR BLD AUTO: 39.2 %
M PROTEIN MFR SERPL ELPH: 6.9 G/DL (ref 6.1–8.3)
MCH RBC QN AUTO: 35 PG (ref 27–33.2)
MCHC RBC AUTO-ENTMCNC: 32.8 G/DL (ref 31–37)
MCV RBC AUTO: 106.4 FL (ref 80–99)
MONOCYTES # BLD AUTO: 0.57 X10(3) UL (ref 0.1–1)
MONOCYTES NFR BLD AUTO: 10.3 %
NEUTROPHIL ABS PRELIM: 2.25 X10 (3) UL (ref 1.3–6.7)
NEUTROPHILS # BLD AUTO: 2.25 X10(3) UL (ref 1.3–6.7)
NEUTROPHILS NFR BLD AUTO: 40.7 %
OSMOLALITY SERPL CALC.SUM OF ELEC: 292 MOSM/KG (ref 275–295)
PLATELET # BLD AUTO: 141 10(3)UL (ref 150–450)
POTASSIUM SERPL-SCNC: 4.2 MMOL/L (ref 3.6–5.1)
RBC # BLD AUTO: 4.52 X10(6)UL (ref 4.3–5.7)
RED CELL DISTRIBUTION WIDTH-SD: 57 FL (ref 35.1–46.3)
SODIUM SERPL-SCNC: 141 MMOL/L (ref 136–144)
WBC # BLD AUTO: 5.5 X10(3) UL (ref 4–13)

## 2018-07-24 DIAGNOSIS — D75.1 POLYCYTHEMIA, SECONDARY: Primary | ICD-10-CM

## 2018-07-24 DIAGNOSIS — R73.09 OTHER ABNORMAL GLUCOSE: ICD-10-CM

## 2018-07-24 RX ORDER — ERGOCALCIFEROL 1.25 MG/1
CAPSULE ORAL
Qty: 12 CAPSULE | Refills: 0 | OUTPATIENT
Start: 2018-07-24

## 2018-07-30 RX ORDER — METFORMIN HYDROCHLORIDE 750 MG/1
TABLET, EXTENDED RELEASE ORAL
Qty: 30 TABLET | Refills: 0 | OUTPATIENT
Start: 2018-07-30

## 2018-07-31 ENCOUNTER — OFFICE VISIT (OUTPATIENT)
Dept: INTERNAL MEDICINE CLINIC | Facility: CLINIC | Age: 58
End: 2018-07-31

## 2018-07-31 VITALS
SYSTOLIC BLOOD PRESSURE: 134 MMHG | HEART RATE: 78 BPM | RESPIRATION RATE: 16 BRPM | HEIGHT: 70 IN | DIASTOLIC BLOOD PRESSURE: 80 MMHG | BODY MASS INDEX: 45.1 KG/M2 | WEIGHT: 315 LBS

## 2018-07-31 DIAGNOSIS — R74.01 TRANSAMINITIS: ICD-10-CM

## 2018-07-31 DIAGNOSIS — E66.01 MORBID OBESITY (HCC): ICD-10-CM

## 2018-07-31 DIAGNOSIS — R73.09 OTHER ABNORMAL GLUCOSE: ICD-10-CM

## 2018-07-31 DIAGNOSIS — Z51.81 THERAPEUTIC DRUG MONITORING: Primary | ICD-10-CM

## 2018-07-31 PROCEDURE — 93000 ELECTROCARDIOGRAM COMPLETE: CPT | Performed by: INTERNAL MEDICINE

## 2018-07-31 PROCEDURE — 99214 OFFICE O/P EST MOD 30 MIN: CPT | Performed by: INTERNAL MEDICINE

## 2018-07-31 RX ORDER — PHENTERMINE HYDROCHLORIDE 15 MG/1
15 CAPSULE ORAL EVERY MORNING
Qty: 30 CAPSULE | Refills: 0 | Status: SHIPPED | OUTPATIENT
Start: 2018-07-31 | End: 2018-11-28

## 2018-07-31 NOTE — PROGRESS NOTES
HISTORY OF PRESENT ILLNESS  Patient presents with:  Weight Check: uvaldo Lindsey Rosalinda is a 66year old male here for follow up in medical weight loss program.   Weight is same from previous  Not thinking about food but not tracking at all.    Taking m 3.8 (H) 07/19/2018   ALBGLOBRAT 1.4 06/22/2015    07/19/2018   K 4.2 07/19/2018    07/19/2018   CO2 27.0 07/19/2018       Lab Results  Component Value Date    06/06/2018   A1C 5.2 06/06/2018       Lab Results  Component Value Date   CHOL bpm and qtc: 461 bpm   · Add phentermine 15 mg q day   · -advised of side effects and adverse effects of this medication  · -discussed avoidance of caffeine and need to correct his sleep wake cycle  · Needs to start tracking   · Reviewed repeat labs and el

## 2018-08-01 ENCOUNTER — OFFICE VISIT (OUTPATIENT)
Dept: WOUND CARE | Facility: HOSPITAL | Age: 58
End: 2018-08-01
Attending: INTERNAL MEDICINE
Payer: COMMERCIAL

## 2018-08-01 DIAGNOSIS — L84 CORNS AND CALLOSITIES: Primary | ICD-10-CM

## 2018-08-01 DIAGNOSIS — E66.01 MORBID OBESITY (HCC): ICD-10-CM

## 2018-08-01 DIAGNOSIS — R73.03 PREDIABETES: ICD-10-CM

## 2018-08-01 PROCEDURE — 99214 OFFICE O/P EST MOD 30 MIN: CPT

## 2018-08-01 NOTE — PROGRESS NOTES
Chief Complaint  This information was obtained from the patient  Patient is here for an initial exam. Patient was referred here by his primary MD for a left heel wound.  Patient states he developed a blister on his heel after exercising at [employer] Current Outpatient Prescriptions:  betamethasone valerate 0.1 % External Ointment APPLY EXTERNALLY TO THE AFFECTED AREA TWICE DAILY Disp: 45 g Rfl: 2  MetFORMIN HCl  MG Oral Tablet 24 Hr TAKE 1 TABLET(750 MG) BY MOUTH DAILY Disp: 30 tablet Rfl: 0 Information  Medication reconciliation completed at today's visit. : Yes  History of chemotherapy?: No  History of radiation?: No    Medications  metformin  mg tablet,extended release 24 hr oral tablet extended release 24 hr oral  montelukast 10 mg t

## 2018-08-17 ENCOUNTER — TELEPHONE (OUTPATIENT)
Dept: FAMILY MEDICINE CLINIC | Facility: CLINIC | Age: 58
End: 2018-08-17

## 2018-08-17 DIAGNOSIS — R23.4 FISSURE IN SKIN OF FOOT: Primary | ICD-10-CM

## 2018-08-17 DIAGNOSIS — L84 CALLUS OF FOOT: ICD-10-CM

## 2018-08-20 NOTE — TELEPHONE ENCOUNTER
Patient was seen by the wound clinic on 8/1/18. Patient was advised to follow-up with Dr. Cliff Duvall (podiatry) by the wound clinic. Dr. Cliff Duvall is in-network. Okay for referral?  Please advise. Thank you!

## 2018-09-28 DIAGNOSIS — L20.9 ATOPIC DERMATITIS, UNSPECIFIED TYPE: ICD-10-CM

## 2018-09-28 NOTE — TELEPHONE ENCOUNTER
Pt requesting refill of betamethasone valerate 0.1% ointment. LOV: 7/13/18  LF: 7/10/18    Pended rx. Pls approve or deny.      Sent Billy msg to RTC for f/u in Oct.

## 2018-09-28 NOTE — TELEPHONE ENCOUNTER
Patient called and is wondering if he can get another refill for betamethasone valerate 0.1 % External Ointment. Patient uses [facility] on [address] [place]. Please call patient when approved.

## 2018-11-20 DIAGNOSIS — J00 ACUTE NASOPHARYNGITIS: ICD-10-CM

## 2018-11-20 NOTE — TELEPHONE ENCOUNTER
Montelukast Sodium (SINGULAIR) 10 MG Oral Tab Sig - Route: Take 1 tablet (10 mg total) by mouth daily. - Oral    Patient is needing on refill on this medication sent to [facility] in [place].

## 2018-11-21 RX ORDER — MONTELUKAST SODIUM 10 MG/1
10 TABLET ORAL DAILY
Qty: 90 TABLET | Refills: 0 | Status: SHIPPED | OUTPATIENT
Start: 2018-11-21 | End: 2019-02-21

## 2018-11-28 ENCOUNTER — LAB ENCOUNTER (OUTPATIENT)
Dept: LAB | Age: 58
End: 2018-11-28
Attending: FAMILY MEDICINE
Payer: COMMERCIAL

## 2018-11-28 ENCOUNTER — OFFICE VISIT (OUTPATIENT)
Dept: FAMILY MEDICINE CLINIC | Facility: CLINIC | Age: 58
End: 2018-11-28

## 2018-11-28 VITALS
DIASTOLIC BLOOD PRESSURE: 86 MMHG | HEIGHT: 69.5 IN | RESPIRATION RATE: 18 BRPM | BODY MASS INDEX: 45.61 KG/M2 | SYSTOLIC BLOOD PRESSURE: 148 MMHG | HEART RATE: 78 BPM | WEIGHT: 315 LBS | TEMPERATURE: 98 F

## 2018-11-28 DIAGNOSIS — N47.1 ACQUIRED PHIMOSIS: ICD-10-CM

## 2018-11-28 DIAGNOSIS — Z13.29 SCREENING FOR ENDOCRINE, NUTRITIONAL, METABOLIC AND IMMUNITY DISORDER: ICD-10-CM

## 2018-11-28 DIAGNOSIS — Z13.228 SCREENING FOR ENDOCRINE, NUTRITIONAL, METABOLIC AND IMMUNITY DISORDER: ICD-10-CM

## 2018-11-28 DIAGNOSIS — Z13.0 SCREENING FOR ENDOCRINE, NUTRITIONAL, METABOLIC AND IMMUNITY DISORDER: ICD-10-CM

## 2018-11-28 DIAGNOSIS — Z13.21 SCREENING FOR ENDOCRINE, NUTRITIONAL, METABOLIC AND IMMUNITY DISORDER: ICD-10-CM

## 2018-11-28 DIAGNOSIS — R23.4 FISSURE IN SKIN OF FOOT: ICD-10-CM

## 2018-11-28 DIAGNOSIS — K63.5 POLYP OF COLON, UNSPECIFIED PART OF COLON, UNSPECIFIED TYPE: ICD-10-CM

## 2018-11-28 DIAGNOSIS — L72.3 SEBACEOUS CYST: ICD-10-CM

## 2018-11-28 DIAGNOSIS — Z00.00 ANNUAL PHYSICAL EXAM: Primary | ICD-10-CM

## 2018-11-28 DIAGNOSIS — Z71.3 WEIGHT LOSS COUNSELING, ENCOUNTER FOR: ICD-10-CM

## 2018-11-28 DIAGNOSIS — D75.1 POLYCYTHEMIA, SECONDARY: ICD-10-CM

## 2018-11-28 DIAGNOSIS — E66.01 MORBID OBESITY WITH BMI OF 60.0-69.9, ADULT (HCC): ICD-10-CM

## 2018-11-28 DIAGNOSIS — R73.09 OTHER ABNORMAL GLUCOSE: ICD-10-CM

## 2018-11-28 PROCEDURE — 80061 LIPID PANEL: CPT

## 2018-11-28 PROCEDURE — 80053 COMPREHEN METABOLIC PANEL: CPT

## 2018-11-28 PROCEDURE — 99396 PREV VISIT EST AGE 40-64: CPT | Performed by: FAMILY MEDICINE

## 2018-11-28 PROCEDURE — 84443 ASSAY THYROID STIM HORMONE: CPT

## 2018-11-28 PROCEDURE — 99214 OFFICE O/P EST MOD 30 MIN: CPT | Performed by: FAMILY MEDICINE

## 2018-11-28 PROCEDURE — 85025 COMPLETE CBC W/AUTO DIFF WBC: CPT

## 2018-11-28 PROCEDURE — 36415 COLL VENOUS BLD VENIPUNCTURE: CPT

## 2018-11-28 NOTE — PROGRESS NOTES
Chief Complaint:   Patient presents with:  Physical    HPI:   This is a 66year old male presenting for annual physical and  left leg swelling, some improvement with steroid and also has seen dr. Dhiraj Oneal.     Patient has a history of high BMI of 60 needs rosalinda Use      Smoking status: Never Smoker      Smokeless tobacco: Never Used    Alcohol use: No    Drug use: No    Family History:  Family History   Problem Relation Age of Onset   • Diabetes Mother      Allergies:  No Known Allergies  Current Meds:    Current oz   BMI 62.26 kg/m²  Estimated body mass index is 62.26 kg/m² as calculated from the following:    Height as of this encounter: 69.5\". Weight as of this encounter: 427 lb 12 oz. Vital signs reviewed. Appears stated age, well groomed.   Physical Exam visit was done    2. Screening for endocrine, nutritional, metabolic and immunity disorder  -due for labs. - CBC WITH DIFFERENTIAL WITH PLATELET; Future  - COMP METABOLIC PANEL (14); Future  - LIPID PANEL;  Future  - TSH W REFLEX TO FREE T4; Future  - PSA

## 2018-12-06 ENCOUNTER — TELEPHONE (OUTPATIENT)
Dept: FAMILY MEDICINE CLINIC | Facility: CLINIC | Age: 58
End: 2018-12-06

## 2018-12-06 NOTE — TELEPHONE ENCOUNTER
Below message received from referral dept regarding referral to Dr. Noah Huffman:    [facility]uan [facility] Clinical Staff; [facility] Referral Pool             Referral cancelled please follow [facility] Bariatric guidelines.       Per [facility] guidelines patient

## 2018-12-07 NOTE — TELEPHONE ENCOUNTER
Will his February weight that was taken in [facility] work? Or August weight from [facility]? Otherwise, if these are the guidelines if his insurance, he'll need to start over with 6 months of consecutive weigh-ins. Please inform patient.

## 2018-12-07 NOTE — TELEPHONE ENCOUNTER
Per [facility] guidelines patient must has 6 consecutive months of weights. Patient had weights from March, April, May [place], and July from [facility] office. Will the Feb 2018 weight from [facility] work or the August 2018 weight from Wound clinic work?  Or woul

## 2018-12-24 NOTE — TELEPHONE ENCOUNTER
No response from referral department. Another referral message sent. Will marti marti for follow-up.

## 2019-01-01 ENCOUNTER — OFFICE VISIT (OUTPATIENT)
Dept: FAMILY MEDICINE CLINIC | Facility: CLINIC | Age: 59
End: 2019-01-01

## 2019-01-01 VITALS
RESPIRATION RATE: 20 BRPM | DIASTOLIC BLOOD PRESSURE: 86 MMHG | SYSTOLIC BLOOD PRESSURE: 140 MMHG | OXYGEN SATURATION: 96 % | WEIGHT: 315 LBS | BODY MASS INDEX: 46.65 KG/M2 | TEMPERATURE: 98 F | HEART RATE: 86 BPM | HEIGHT: 69 IN

## 2019-01-01 DIAGNOSIS — L03.116 CELLULITIS OF LEFT LOWER EXTREMITY: Primary | ICD-10-CM

## 2019-01-01 PROCEDURE — 99213 OFFICE O/P EST LOW 20 MIN: CPT | Performed by: NURSE PRACTITIONER

## 2019-01-01 RX ORDER — CLINDAMYCIN HYDROCHLORIDE 300 MG/1
300 CAPSULE ORAL 3 TIMES DAILY
Qty: 30 CAPSULE | Refills: 0 | Status: SHIPPED | OUTPATIENT
Start: 2019-01-01 | End: 2020-01-01

## 2019-01-02 NOTE — TELEPHONE ENCOUNTER
No response from referral department. Called [facility] and spoke with Susana Loera. She advised that a new bariatric checklist needs to be followed. Updated check list to be faxed to office (triage fax) from [facility].   Will await updated checklist.

## 2019-01-02 NOTE — TELEPHONE ENCOUNTER
Check list received from [facility]. This is the same checklist we currently have. Called [facility] back and spoke with Kathi.  She advised that there is an updated checklist that is 2 pages in length.   She explained that the referral in Epic will be cancelled glendy

## 2019-01-08 ENCOUNTER — OFFICE VISIT (OUTPATIENT)
Dept: FAMILY MEDICINE CLINIC | Facility: CLINIC | Age: 59
End: 2019-01-08

## 2019-01-08 VITALS
OXYGEN SATURATION: 96 % | BODY MASS INDEX: 45.61 KG/M2 | DIASTOLIC BLOOD PRESSURE: 100 MMHG | HEART RATE: 65 BPM | HEIGHT: 69.5 IN | SYSTOLIC BLOOD PRESSURE: 162 MMHG | WEIGHT: 315 LBS | TEMPERATURE: 98 F | RESPIRATION RATE: 18 BRPM

## 2019-01-08 DIAGNOSIS — R39.9 UTI SYMPTOMS: Primary | ICD-10-CM

## 2019-01-08 DIAGNOSIS — M25.562 ACUTE PAIN OF LEFT KNEE: ICD-10-CM

## 2019-01-08 LAB
APPEARANCE: CLEAR
MULTISTIX LOT#: ABNORMAL NUMERIC
PH, URINE: 7 (ref 4.5–8)
SPECIFIC GRAVITY: 1.02 (ref 1–1.03)
UROBILINOGEN,SEMI-QN: 8 MG/DL (ref 0–1.9)

## 2019-01-08 PROCEDURE — 87086 URINE CULTURE/COLONY COUNT: CPT | Performed by: NURSE PRACTITIONER

## 2019-01-08 PROCEDURE — 99214 OFFICE O/P EST MOD 30 MIN: CPT | Performed by: NURSE PRACTITIONER

## 2019-01-08 PROCEDURE — 81003 URINALYSIS AUTO W/O SCOPE: CPT | Performed by: NURSE PRACTITIONER

## 2019-01-08 RX ORDER — BETAMETHASONE DIPROPIONATE 0.5 MG/G
CREAM TOPICAL
Refills: 0 | COMMUNITY
Start: 2018-12-31 | End: 2019-02-11 | Stop reason: ALTCHOICE

## 2019-01-09 NOTE — PATIENT INSTRUCTIONS
Anatomy of the Male Urinary Tract  Your urinary tract helps to get rid of your body’s liquid waste. The kidneys constantly filter the blood to collect unneeded chemicals and water, making urine. Urine travels through the ureters to the bladder.  The bladd · Breakdown of the bone under the cartilage  · Swelling in the soft tissues around the kneecap  · Injury  You might be more likely to have knee pain if you:  · Exercise a lot  · Recently increased the intensity of your workouts  · Have a body mass index (B · Doing special exercises to stretch and strengthen the muscles around your hip and your knee  These steps help most people manage knee pain. But some cases of knee pain need to be treated with surgery. You may need surgery right away.  Or you may need it l

## 2019-01-09 NOTE — PROGRESS NOTES
CHIEF COMPLAINT:   Patient presents with:  Nausea: fever, headache yesterday gone today, red urine this morning and L knee pain    HPI:   Rosina Sanabria is a 66year old male who presents with multiple complaints.  Reports possible fever yesterday, nause SKIN: no rashes, no skin wounds or ulcers.   EYES:denies blurred vision or double vision  HEENT: no congestion, rhinorrhea, sore throat or ear pain  CARDIOVASCULAR: denies chest pain or palpitations  LUNGS: denies shortness of breath, cough, or wheezing  GI 2. Acute pain of left knee  Follow up with PCP. Tylenol or motrin for pain. Rest/ice  See pt hand out. PLAN: Meds as listed below. Urine sent for culture.  Explained to patient that there will be additional costs sent to her insurance with the urin Your kneecap (patella) is a thick, round bone. It covers and protects the front portion of your knee joint. It moves along a groove in your thighbone (femur) as part of the patellofemoral joint. A layer of cartilage surrounds the underside of your kneecap. Your healthcare provider will ask about your medical history and your symptoms. Be sure to describe any activities that make your knee pain worse. He or she will look at your knee.  This will include tests of your range of motion, strength, and areas of lamin Call your healthcare provider right away if:  · Your symptoms don’t get better after a few weeks of treatment  · You have any new symptoms   Date Last Reviewed: 4/1/2017  © 8590-3237 [employer] [address]. [address] [facility] [place] [address] [place] [address].  All

## 2019-01-09 NOTE — TELEPHONE ENCOUNTER
Have you received an approved/signed bariatric checklist back from Dr. Cherie Ivey? Please advise. Thank you!

## 2019-01-10 NOTE — TELEPHONE ENCOUNTER
Approved bariatric checklist received back via fax from Dr. Bobby Vasquez. Signed checklist along with office visit note faxed to Dr. Audrey Valerio office. Confirmation received. Copy of approved checklist sent to scanning.

## 2019-01-11 ENCOUNTER — TELEPHONE (OUTPATIENT)
Dept: INTERNAL MEDICINE CLINIC | Facility: CLINIC | Age: 59
End: 2019-01-11

## 2019-01-11 NOTE — TELEPHONE ENCOUNTER
NAOVM to call [phone] to schedule the bariatric seminar.  Any questions call the office back at [phone]

## 2019-01-17 ENCOUNTER — OFFICE VISIT (OUTPATIENT)
Dept: SURGERY | Facility: CLINIC | Age: 59
End: 2019-01-17

## 2019-01-17 VITALS
HEART RATE: 73 BPM | WEIGHT: 315 LBS | DIASTOLIC BLOOD PRESSURE: 97 MMHG | SYSTOLIC BLOOD PRESSURE: 169 MMHG | BODY MASS INDEX: 46.65 KG/M2 | TEMPERATURE: 97 F | HEIGHT: 69 IN

## 2019-01-17 DIAGNOSIS — L72.9 SKIN CYST: Primary | ICD-10-CM

## 2019-01-17 PROCEDURE — 99243 OFF/OP CNSLTJ NEW/EST LOW 30: CPT | Performed by: COLON & RECTAL SURGERY

## 2019-01-17 NOTE — H&P
New Patient Visit Note       Active Problems      1.  Skin cyst        Chief Complaint   Patient presents with:  Cyst: Right shoulder cyst      History of Present Illness   Nima King is a 66year old male referred by Dr. Vicente Katz for evaluation of of education: Not on file      Highest education level: Not on file    Tobacco Use      Smoking status: Never Smoker      Smokeless tobacco: Never Used    Substance and Sexual Activity      Alcohol use: No      Drug use: No      Sexual activity: No    Othe time. He appears well-developed and well-nourished. No distress. HENT:   Head: Normocephalic and atraumatic. Nose: Nose normal.   Eyes: EOM are normal. Pupils are equal, round, and reactive to light. No scleral icterus. Neck: Normal range of motion. Shanda Barragan MD

## 2019-01-17 NOTE — PATIENT INSTRUCTIONS
Assessment   Skin cyst  (primary encounter diagnosis)      Plan   Patient likely has either a sebaceous cyst versus an epidermal inclusion cyst on the skin on the posterior aspect of the right shoulder. There is no evidence of infection.     Recommend exci

## 2019-01-17 NOTE — H&P (VIEW-ONLY)
New Patient Visit Note       Active Problems      1.  Skin cyst        Chief Complaint   Patient presents with:  Cyst: Right shoulder cyst      History of Present Illness   Aloma Patch is a 66year old male referred by Dr. Mila Sales for evaluation of of education: Not on file      Highest education level: Not on file    Tobacco Use      Smoking status: Never Smoker      Smokeless tobacco: Never Used    Substance and Sexual Activity      Alcohol use: No      Drug use: No      Sexual activity: No    Othe time. He appears well-developed and well-nourished. No distress. HENT:   Head: Normocephalic and atraumatic. Nose: Nose normal.   Eyes: EOM are normal. Pupils are equal, round, and reactive to light. No scleral icterus. Neck: Normal range of motion. Eliseo Peter MD

## 2019-01-18 ENCOUNTER — TELEPHONE (OUTPATIENT)
Dept: FAMILY MEDICINE CLINIC | Facility: CLINIC | Age: 59
End: 2019-01-18

## 2019-01-18 NOTE — TELEPHONE ENCOUNTER
Called pt to set up pre-op for his surgery on 1/23/19 filled out pink sheet and put in dr Mejia Cage folder.

## 2019-01-21 ENCOUNTER — OFFICE VISIT (OUTPATIENT)
Dept: FAMILY MEDICINE CLINIC | Facility: CLINIC | Age: 59
End: 2019-01-21

## 2019-01-21 VITALS
HEART RATE: 74 BPM | RESPIRATION RATE: 18 BRPM | DIASTOLIC BLOOD PRESSURE: 72 MMHG | BODY MASS INDEX: 46.65 KG/M2 | SYSTOLIC BLOOD PRESSURE: 148 MMHG | TEMPERATURE: 98 F | WEIGHT: 315 LBS | HEIGHT: 69 IN

## 2019-01-21 DIAGNOSIS — E66.01 MORBID OBESITY WITH BMI OF 60.0-69.9, ADULT (HCC): ICD-10-CM

## 2019-01-21 DIAGNOSIS — Z01.818 PRE-OP EVALUATION: Primary | ICD-10-CM

## 2019-01-21 DIAGNOSIS — J34.9 SINUS DISEASE: ICD-10-CM

## 2019-01-21 DIAGNOSIS — E66.01 MORBID OBESITY (HCC): ICD-10-CM

## 2019-01-21 DIAGNOSIS — G47.33 OSA TREATED WITH BIPAP: ICD-10-CM

## 2019-01-21 DIAGNOSIS — J34.2 DEVIATED SEPTUM: ICD-10-CM

## 2019-01-21 PROCEDURE — 93000 ELECTROCARDIOGRAM COMPLETE: CPT | Performed by: FAMILY MEDICINE

## 2019-01-21 PROCEDURE — 99243 OFF/OP CNSLTJ NEW/EST LOW 30: CPT | Performed by: FAMILY MEDICINE

## 2019-01-22 ENCOUNTER — ANESTHESIA EVENT (OUTPATIENT)
Dept: SURGERY | Facility: HOSPITAL | Age: 59
End: 2019-01-22

## 2019-01-22 PROBLEM — J34.9 SINUS DISEASE: Status: ACTIVE | Noted: 2019-01-22

## 2019-01-22 PROBLEM — J34.2 DEVIATED SEPTUM: Status: ACTIVE | Noted: 2019-01-22

## 2019-01-22 PROBLEM — E66.01 MORBID OBESITY WITH BMI OF 60.0-69.9, ADULT (HCC): Status: ACTIVE | Noted: 2019-01-22

## 2019-01-22 NOTE — PROGRESS NOTES
Rosalia Tellez is a 66year old male who presents for a pre-operative physical exam.   HPI related to surgery:   Rosalia Tellez is presenting for surgery per request of Dr. Sascha Ram scheduled for a excision right shoulder cyst procedure to be performed by JONATHAN Take 1 tablet by mouth daily. Disp:  Rfl:    Montelukast Sodium (SINGULAIR) 10 MG Oral Tab Take 1 tablet (10 mg total) by mouth daily.  Disp: 90 tablet Rfl: 0   betamethasone dipropionate 0.05 % External Cream MARTINA THIN LAYER EXT AA ONCE D Disp:  Rfl: 0 NSR and normal rhythm. ASSESSMENT AND PLAN:   Kam Gamez has no significant history of cardiac or pulmonary conditions. He is a good surgical candidate and medically cleared for procedure.    Patient's medically cleared for medical clearance was

## 2019-01-23 ENCOUNTER — ANESTHESIA (OUTPATIENT)
Dept: SURGERY | Facility: HOSPITAL | Age: 59
End: 2019-01-23

## 2019-01-23 ENCOUNTER — HOSPITAL ENCOUNTER (OUTPATIENT)
Facility: HOSPITAL | Age: 59
Setting detail: HOSPITAL OUTPATIENT SURGERY
Discharge: HOME | End: 2019-01-23
Attending: COLON & RECTAL SURGERY | Admitting: COLON & RECTAL SURGERY
Payer: COMMERCIAL

## 2019-01-23 VITALS
RESPIRATION RATE: 16 BRPM | HEIGHT: 69 IN | TEMPERATURE: 99 F | WEIGHT: 315 LBS | HEART RATE: 70 BPM | SYSTOLIC BLOOD PRESSURE: 139 MMHG | BODY MASS INDEX: 46.65 KG/M2 | OXYGEN SATURATION: 95 % | DIASTOLIC BLOOD PRESSURE: 78 MMHG

## 2019-01-23 DIAGNOSIS — L72.9 SKIN CYST: ICD-10-CM

## 2019-01-23 PROCEDURE — 11404 EXC TR-EXT B9+MARG 3.1-4 CM: CPT | Performed by: COLON & RECTAL SURGERY

## 2019-01-23 PROCEDURE — 0JQ70ZZ REPAIR BACK SUBCUTANEOUS TISSUE AND FASCIA, OPEN APPROACH: ICD-10-PCS | Performed by: COLON & RECTAL SURGERY

## 2019-01-23 PROCEDURE — 0HBBXZZ EXCISION OF RIGHT UPPER ARM SKIN, EXTERNAL APPROACH: ICD-10-PCS | Performed by: COLON & RECTAL SURGERY

## 2019-01-23 RX ORDER — NALOXONE HYDROCHLORIDE 0.4 MG/ML
80 INJECTION, SOLUTION INTRAMUSCULAR; INTRAVENOUS; SUBCUTANEOUS AS NEEDED
Status: DISCONTINUED | OUTPATIENT
Start: 2019-01-23 | End: 2019-01-23

## 2019-01-23 RX ORDER — TRAMADOL HYDROCHLORIDE 50 MG/1
50 TABLET ORAL EVERY 6 HOURS PRN
Qty: 10 TABLET | Refills: 0 | Status: SHIPPED | OUTPATIENT
Start: 2019-01-23 | End: 2019-02-11

## 2019-01-23 RX ORDER — SODIUM CHLORIDE, SODIUM LACTATE, POTASSIUM CHLORIDE, CALCIUM CHLORIDE 600; 310; 30; 20 MG/100ML; MG/100ML; MG/100ML; MG/100ML
INJECTION, SOLUTION INTRAVENOUS CONTINUOUS
Status: DISCONTINUED | OUTPATIENT
Start: 2019-01-23 | End: 2019-01-23

## 2019-01-23 RX ORDER — HEPARIN SODIUM 5000 [USP'U]/ML
5000 INJECTION, SOLUTION INTRAVENOUS; SUBCUTANEOUS ONCE
Status: COMPLETED | OUTPATIENT
Start: 2019-01-23 | End: 2019-01-23

## 2019-01-23 RX ORDER — METOCLOPRAMIDE HYDROCHLORIDE 5 MG/ML
10 INJECTION INTRAMUSCULAR; INTRAVENOUS AS NEEDED
Status: DISCONTINUED | OUTPATIENT
Start: 2019-01-23 | End: 2019-01-23

## 2019-01-23 RX ORDER — ACETAMINOPHEN 500 MG
1000 TABLET ORAL ONCE
COMMUNITY
End: 2019-02-11

## 2019-01-23 RX ORDER — ONDANSETRON 2 MG/ML
4 INJECTION INTRAMUSCULAR; INTRAVENOUS AS NEEDED
Status: DISCONTINUED | OUTPATIENT
Start: 2019-01-23 | End: 2019-01-23

## 2019-01-23 RX ORDER — HEPARIN SODIUM 5000 [USP'U]/ML
INJECTION, SOLUTION INTRAVENOUS; SUBCUTANEOUS
Status: DISCONTINUED
Start: 2019-01-23 | End: 2019-01-23

## 2019-01-23 RX ORDER — DEXAMETHASONE SODIUM PHOSPHATE 4 MG/ML
8 VIAL (ML) INJECTION AS NEEDED
Status: DISCONTINUED | OUTPATIENT
Start: 2019-01-23 | End: 2019-01-23

## 2019-01-23 RX ORDER — HYDROMORPHONE HYDROCHLORIDE 1 MG/ML
0.4 INJECTION, SOLUTION INTRAMUSCULAR; INTRAVENOUS; SUBCUTANEOUS EVERY 5 MIN PRN
Status: DISCONTINUED | OUTPATIENT
Start: 2019-01-23 | End: 2019-01-23

## 2019-01-23 RX ORDER — MEPERIDINE HYDROCHLORIDE 25 MG/ML
12.5 INJECTION INTRAMUSCULAR; INTRAVENOUS; SUBCUTANEOUS AS NEEDED
Status: DISCONTINUED | OUTPATIENT
Start: 2019-01-23 | End: 2019-01-23

## 2019-01-23 RX ORDER — HYDROCODONE BITARTRATE AND ACETAMINOPHEN 5; 325 MG/1; MG/1
2 TABLET ORAL AS NEEDED
Status: DISCONTINUED | OUTPATIENT
Start: 2019-01-23 | End: 2019-01-23

## 2019-01-23 RX ORDER — MIDAZOLAM HYDROCHLORIDE 1 MG/ML
1 INJECTION INTRAMUSCULAR; INTRAVENOUS EVERY 5 MIN PRN
Status: DISCONTINUED | OUTPATIENT
Start: 2019-01-23 | End: 2019-01-23

## 2019-01-23 RX ORDER — HYDROCODONE BITARTRATE AND ACETAMINOPHEN 5; 325 MG/1; MG/1
1 TABLET ORAL AS NEEDED
Status: DISCONTINUED | OUTPATIENT
Start: 2019-01-23 | End: 2019-01-23

## 2019-01-23 RX ORDER — LIDOCAINE HYDROCHLORIDE 10 MG/ML
INJECTION, SOLUTION INFILTRATION; PERINEURAL AS NEEDED
Status: DISCONTINUED | OUTPATIENT
Start: 2019-01-23 | End: 2019-01-23

## 2019-01-23 RX ORDER — ACETAMINOPHEN 500 MG
1000 TABLET ORAL ONCE
Status: DISCONTINUED | OUTPATIENT
Start: 2019-01-23 | End: 2019-01-23

## 2019-01-23 RX ORDER — BUPIVACAINE HYDROCHLORIDE AND EPINEPHRINE 2.5; 5 MG/ML; UG/ML
INJECTION, SOLUTION INFILTRATION; PERINEURAL AS NEEDED
Status: DISCONTINUED | OUTPATIENT
Start: 2019-01-23 | End: 2019-01-23

## 2019-01-23 NOTE — OPERATIVE REPORT
[facility]  Operative Note    [facility] Location: OR   [facility] [id number] MRN [id number]    4/15/1960 Age 66year old   Admission Date 2019 Operation Date 2019   Attending Physician Monique Thurman MD Operating Physician Miranda Lobo done the cystic structure was passed off for permanent specimen. Hemostasis was achieved with electrocautery. The surgical site was irrigated copiously with saline. The deep space was reapproximated with 2-0 Vicryl suture.   The dermis was reapproximated

## 2019-01-23 NOTE — ANESTHESIA POSTPROCEDURE EVALUATION
[address] Patient Status:  Hospital Outpatient Surgery   Age/Gender 66year old male MRN [id number]   [facility] SURGERY Attending Walt Daniel MD   Hosp Day # 0 PCP Mila Sales MD       Anesthesia Post-op N

## 2019-01-23 NOTE — INTERVAL H&P NOTE
Pre-op Diagnosis: Skin cyst [L72.9]    The above referenced H&P was reviewed by Antonio Perry MD on 1/23/2019, the patient was examined and no significant changes have occurred in the patient's condition since the H&P was performed.   I discussed wi

## 2019-01-23 NOTE — ANESTHESIA PREPROCEDURE EVALUATION
PRE-OP EVALUATION    Patient Name: Clifford Pfeiffer    Pre-op Diagnosis: Skin cyst [L72.9]    Procedure(s):  EXCISION OF RIGHT SHOULDER SKIN CYST LAYERED CLOSURE    Surgeon(s) and Role:     * Norbert Homans, MD - Primary    Pre-op vitals reviewed.   Ines Pedroza abnormal glucose     Polycythemia, secondary     Testosterone deficiency     Apnea     Obesity     Morbid obesity ([facility]     Cellulitis of left lower extremity     Transaminitis     VENKATA treated with BiPAP     Morbid obesity with BMI of 60.0-69.9, adult ([facility]. anesthesia. The backup plan for safe patient care may require change of plan to full general anesthesia with potential airway placement and attendant risks.   Patient (legal guardian) demonstrated understanding, accepted risks and benefits, and wishes to p

## 2019-01-25 ENCOUNTER — TELEPHONE (OUTPATIENT)
Dept: INTERNAL MEDICINE CLINIC | Facility: CLINIC | Age: 59
End: 2019-01-25

## 2019-01-25 NOTE — TELEPHONE ENCOUNTER
ORALIA to call office back. Patient needs to call and set up to attend one of the bariatric seminars at [phone], after he has booked this, then we can enter in the referral to see Dr. Jake Tan. Please route the call to me if any questions.

## 2019-02-11 ENCOUNTER — OFFICE VISIT (OUTPATIENT)
Dept: FAMILY MEDICINE CLINIC | Facility: CLINIC | Age: 59
End: 2019-02-11

## 2019-02-11 VITALS
DIASTOLIC BLOOD PRESSURE: 82 MMHG | SYSTOLIC BLOOD PRESSURE: 140 MMHG | WEIGHT: 315 LBS | OXYGEN SATURATION: 97 % | HEIGHT: 69 IN | TEMPERATURE: 98 F | RESPIRATION RATE: 18 BRPM | HEART RATE: 68 BPM | BODY MASS INDEX: 46.65 KG/M2

## 2019-02-11 DIAGNOSIS — E66.01 MORBID OBESITY (HCC): ICD-10-CM

## 2019-02-11 DIAGNOSIS — R03.0 ELEVATED BLOOD PRESSURE READING IN OFFICE WITHOUT DIAGNOSIS OF HYPERTENSION: ICD-10-CM

## 2019-02-11 DIAGNOSIS — K02.9 DENTAL CAVITY: Primary | ICD-10-CM

## 2019-02-11 PROCEDURE — 99214 OFFICE O/P EST MOD 30 MIN: CPT | Performed by: NURSE PRACTITIONER

## 2019-02-11 RX ORDER — FLUTICASONE PROPIONATE 50 MCG
2 SPRAY, SUSPENSION (ML) NASAL AS NEEDED
COMMUNITY
End: 2019-06-17

## 2019-02-11 NOTE — PATIENT INSTRUCTIONS
Identifying Your Heart Risks  What are your risk factors? A risk factor increases your chance of having heart disease. Some risk factors can’t be controlled, such as age or family history of heart disease.  But most others can be managed by making lifest · Your A1C is between 5.7 and 6.4. Once it reaches 6.5, you have diabetes. Your healthcare provider will help you figure out what your A1C should be. Your target number will depend on your age, general health, and other factors.  Your treatment plan may ne · BMI 30 and over: Obese  · BMI 40 and over: Severe obesity   Online BMI Calculators  Find your BMI with an online BMI calculator tool, such as these from the CDC:  · BMI calculator for adults  · BMI calculator for children and teens   Using the BMI chart · Regular exercise can increase the amount of muscle in your body. Muscle burns calories faster than fat. The more muscle you have, the more calories you burn. · Exercise gives you energy and curbs your appetite.   · Exercise decreases stress and helps you A gram of fat has almost 2.5 times the calories of a gram of protein or carbohydrates. Try to balance your food choices so that only 20% to 35% of your calories comes from total fat.  This means an average of 2½ to 3½ grams of fat for each 100 calories you

## 2019-02-11 NOTE — PROGRESS NOTES
Chief Complaint:   Patient presents with:  Referral: ENT    HPI:   This is a 66year old male presenting for discussion of dental problem, weight loss, and blood pressure.      Dental problem  Patient is in the process of having a tooth pulled- 3rd upper mo pt stated that he was on antibiotics for 2 courses, he will follow up with md and inform Dr Shamir Ortiz as to outcome   • Visual impairment     glasses   • Vitamin D deficiency      Past Surgical History:   Procedure Laterality Date   • COLONOSCOPY N/A 4/1/201 Psychiatric/Behavioral: Negative for depressed mood. The patient is not nervous/anxious.       EXAM:   /80   Pulse 68   Temp 97.7 °F (36.5 °C) (Oral)   Resp 18   Ht 69\"   Wt (!) 436 lb   SpO2 97%   BMI 64.39 kg/m²  Estimated body mass index is 64.39 -No evidence of any infection or abscess  -CPM per dentist  -May f/u with ENT per dental recommendations-previously given referral for Dr. Omayra Rae    2.  Morbid obesity ([address]  -Long discussion regarding diet and exercise.  -Encouraged low fat, low carbohydr

## 2019-02-12 ENCOUNTER — OFFICE VISIT (OUTPATIENT)
Dept: SURGERY | Facility: CLINIC | Age: 59
End: 2019-02-12

## 2019-02-12 VITALS
HEIGHT: 69 IN | HEART RATE: 73 BPM | BODY MASS INDEX: 46.65 KG/M2 | WEIGHT: 315 LBS | DIASTOLIC BLOOD PRESSURE: 102 MMHG | TEMPERATURE: 99 F | SYSTOLIC BLOOD PRESSURE: 156 MMHG

## 2019-02-12 DIAGNOSIS — L72.0 EPIDERMOID CYST: Primary | ICD-10-CM

## 2019-02-12 PROCEDURE — 99024 POSTOP FOLLOW-UP VISIT: CPT | Performed by: COLON & RECTAL SURGERY

## 2019-02-13 NOTE — PATIENT INSTRUCTIONS
Assessment   Epidermoid cyst  (primary encounter diagnosis)      Plan   The patient is doing well after Excision of right posterior shoulder cyst    Pathology showed benign epidermoid cyst.     There is no sign of post-operative infection or hematoma or se

## 2019-02-13 NOTE — PROGRESS NOTES
Post Operative Visit Note       Active Problems  1.  Epidermoid cyst         Chief Complaint   Patient presents with:  Cyst: No postoperative complaints         History of Present Illness   Lolly Muniz is a 66year old male who underwent excision of rig status: Never Smoker      Smokeless tobacco: Never Used    Alcohol use: No    Drug use: No       Current Outpatient Medications on File Prior to Visit:  Montelukast Sodium (SINGULAIR) 10 MG Oral Tab Take 1 tablet (10 mg total) by mouth daily.    Fluticasone He has no wheezes. Musculoskeletal: Normal range of motion. He exhibits no edema. Lymphadenopathy:     He has no cervical adenopathy. Neurological: He is alert and oriented to person, place, and time. Skin: Skin is warm and dry. No rash noted.  He i

## 2019-02-19 ENCOUNTER — OFFICE VISIT (OUTPATIENT)
Dept: SURGERY | Facility: CLINIC | Age: 59
End: 2019-02-19

## 2019-02-19 VITALS
HEIGHT: 69 IN | TEMPERATURE: 98 F | BODY MASS INDEX: 46.65 KG/M2 | SYSTOLIC BLOOD PRESSURE: 145 MMHG | WEIGHT: 315 LBS | DIASTOLIC BLOOD PRESSURE: 85 MMHG

## 2019-02-19 DIAGNOSIS — N47.1 PHIMOSIS: Primary | ICD-10-CM

## 2019-02-19 PROCEDURE — 99244 OFF/OP CNSLTJ NEW/EST MOD 40: CPT | Performed by: UROLOGY

## 2019-02-19 PROCEDURE — 99212 OFFICE O/P EST SF 10 MIN: CPT | Performed by: UROLOGY

## 2019-02-19 NOTE — PROGRESS NOTES
SUBJECTIVE:  Karthikeyan Maier is a 66year old male who presents for a consultation at the request of, and a copy of this note will be sent to, Dr. Ngozi Berger, for evaluation of  phimosis. He states that the problem is unchanged.  Symptoms include 2-3 year wheezing, cough, shortness of breath,  sputum production,chest pain or pleurisy. CARDIOVASCULAR:  Negative for pain or chest discomfort, dizziness or fainting, palpitations, leg swelling, nocturia, or claudication.   GASTROINTESTINAL:  Negative for nausea,

## 2019-02-21 DIAGNOSIS — J00 ACUTE NASOPHARYNGITIS: ICD-10-CM

## 2019-02-22 RX ORDER — MONTELUKAST SODIUM 10 MG/1
TABLET ORAL
Qty: 90 TABLET | Refills: 0 | Status: SHIPPED | OUTPATIENT
Start: 2019-02-22 | End: 2019-05-24

## 2019-02-22 NOTE — TELEPHONE ENCOUNTER
Medication(s) to Refill:   Requested Prescriptions     Pending Prescriptions Disp Refills   • MONTELUKAST SODIUM 10 MG Oral Tab [Pharmacy Med Name: MONTELUKAST 10MG TABLETS] 90 tablet 0     Sig: TAKE 1 TABLET(10 MG) BY MOUTH DAILY         Reason for Medica

## 2019-02-28 ENCOUNTER — TELEPHONE (OUTPATIENT)
Dept: FAMILY MEDICINE CLINIC | Facility: CLINIC | Age: 59
End: 2019-02-28

## 2019-02-28 ENCOUNTER — NURSE ONLY (OUTPATIENT)
Dept: FAMILY MEDICINE CLINIC | Facility: CLINIC | Age: 59
End: 2019-02-28

## 2019-02-28 ENCOUNTER — TELEPHONE (OUTPATIENT)
Dept: SURGERY | Facility: CLINIC | Age: 59
End: 2019-02-28

## 2019-02-28 VITALS
SYSTOLIC BLOOD PRESSURE: 148 MMHG | WEIGHT: 315 LBS | DIASTOLIC BLOOD PRESSURE: 80 MMHG | HEIGHT: 69 IN | HEART RATE: 76 BPM | BODY MASS INDEX: 46.65 KG/M2 | RESPIRATION RATE: 20 BRPM | OXYGEN SATURATION: 97 % | TEMPERATURE: 98 F

## 2019-02-28 DIAGNOSIS — R39.9 SYMPTOM INVOLVING BLADDER: Primary | ICD-10-CM

## 2019-02-28 DIAGNOSIS — Z01.818 PREOP EXAMINATION: ICD-10-CM

## 2019-02-28 DIAGNOSIS — J00 NASOPHARYNGITIS: Primary | ICD-10-CM

## 2019-02-28 PROCEDURE — 99213 OFFICE O/P EST LOW 20 MIN: CPT | Performed by: NURSE PRACTITIONER

## 2019-02-28 RX ORDER — BETAMETHASONE DIPROPIONATE 0.5 MG/G
CREAM TOPICAL
COMMUNITY
End: 2019-06-17

## 2019-02-28 RX ORDER — AMOXICILLIN 500 MG/1
TABLET, FILM COATED ORAL
Refills: 0 | COMMUNITY
Start: 2019-02-13 | End: 2019-06-17

## 2019-02-28 RX ORDER — TRAMADOL HYDROCHLORIDE 50 MG/1
TABLET ORAL
COMMUNITY
End: 2019-06-17

## 2019-02-28 NOTE — TELEPHONE ENCOUNTER
Dear , patient was seen in our and is considering circumcision, Lalita Ball is asking that patient be medically cleared to proceed.

## 2019-02-28 NOTE — TELEPHONE ENCOUNTER
Beatrice Christine MD   [facility] Clinical Staff 18 minutes ago (12:07 PM)         Please send a medical clearance letter to dr. Clari Liu, that patient's medically cleared.           Documentation       Harvinder Sharma routed conversation to Beatrice Christine MD 46 min

## 2019-03-01 NOTE — PROGRESS NOTES
CHIEF COMPLAINT:   Patient presents with:  Nasal Congestion: runny nose, sneezing, ear clogged, sinus pressure, headache, X 2 days      HPI:   Lolly Muniz is a 66year old male who presents for upper respiratory symptoms for  2 days.  Patient reports ru Performed by Zeinab Nava MD at [facility] ENDOSCOPY   • CYST/MASS EXCISION Right 1/23/2019    Performed by Jocelyn Stout MD at [facility] MAIN OR   • OTHER SURGICAL HISTORY      excision of R. post. shoulder cyst          Social History    Tobacco Use Discussed viral vs bacterial illness with pt. Pt is concerned symptoms will develop into a sinus infection.  Discussed with pt that he is on day 2 of symptoms and at this time there are no signs of bacterial sinus infection given absence of congestion or si · You may use acetaminophen or ibuprofen to control pain and fever, unless another medicine was prescribed. If you have chronic liver or kidney disease, have ever had a stomach ulcer or gastrointestinal bleeding, or are taking blood-thinning medicines, huey

## 2019-03-01 NOTE — TELEPHONE ENCOUNTER
I printed this letter and placed it on Ashley's desk in Surgery [facility].

## 2019-03-04 NOTE — TELEPHONE ENCOUNTER
Received patient' clearance letter ok to proceed with surgery, will place in scan bin. L/m on v/m informing patient that medical clearance was received all clear to proceed with surgery.

## 2019-03-11 NOTE — TELEPHONE ENCOUNTER
Thanks, please see telephone encounter Monday, March 4, patient has decided to seek second opinion before deciding.

## 2019-03-20 NOTE — TELEPHONE ENCOUNTER
----- Message from Adry Escalera sent at 3/20/2019 12:09 PM CDT -----  Dr. Dona Ivey    I have received back the signed referral from Dr. Rick Sauceda are able to now enter in a referral for the Surgeon.  Please let me know once this is done, and I can get the checklist

## 2019-03-29 ENCOUNTER — TELEPHONE (OUTPATIENT)
Dept: SURGERY | Facility: CLINIC | Age: 59
End: 2019-03-29

## 2019-03-29 NOTE — TELEPHONE ENCOUNTER
Patient has decided to seek second opinion before proceeding with surgery. Received patient's clearance letter from Dr. Stephen De La Rosa, will place an scan [facility].   Thanks [employer]

## 2019-04-02 ENCOUNTER — MED REC SCAN ONLY (OUTPATIENT)
Dept: FAMILY MEDICINE CLINIC | Facility: CLINIC | Age: 59
End: 2019-04-02

## 2019-05-13 ENCOUNTER — TELEPHONE (OUTPATIENT)
Dept: FAMILY MEDICINE CLINIC | Facility: CLINIC | Age: 59
End: 2019-05-13

## 2019-05-13 DIAGNOSIS — Z12.11 SCREENING FOR COLON CANCER: Primary | ICD-10-CM

## 2019-05-14 NOTE — TELEPHONE ENCOUNTER
LM on pt cell phone to call the office back. pt is being dischg from [address] [place] [address]

## 2019-05-15 NOTE — TELEPHONE ENCOUNTER
Patient called back and spoke with him. Patient already sees Dr. Abhilash Anderson with [facility]. Advised him to see Dr. Malika Sifuentes office for his c-scope. Patient states understanding. Patient is also asking about the FIT test on his My Chart.   Advised hi

## 2019-05-24 DIAGNOSIS — J00 ACUTE NASOPHARYNGITIS: ICD-10-CM

## 2019-05-24 RX ORDER — MONTELUKAST SODIUM 10 MG/1
TABLET ORAL
Qty: 90 TABLET | Refills: 1 | Status: SHIPPED | OUTPATIENT
Start: 2019-05-24 | End: 2019-11-15

## 2019-06-17 ENCOUNTER — OFFICE VISIT (OUTPATIENT)
Dept: FAMILY MEDICINE CLINIC | Facility: CLINIC | Age: 59
End: 2019-06-17

## 2019-06-17 ENCOUNTER — LAB ENCOUNTER (OUTPATIENT)
Dept: LAB | Age: 59
End: 2019-06-17
Attending: INTERNAL MEDICINE
Payer: COMMERCIAL

## 2019-06-17 VITALS
HEART RATE: 80 BPM | SYSTOLIC BLOOD PRESSURE: 122 MMHG | TEMPERATURE: 99 F | WEIGHT: 315 LBS | DIASTOLIC BLOOD PRESSURE: 78 MMHG | RESPIRATION RATE: 18 BRPM | HEIGHT: 69 IN | BODY MASS INDEX: 46.65 KG/M2

## 2019-06-17 DIAGNOSIS — R74.01 TRANSAMINITIS: ICD-10-CM

## 2019-06-17 DIAGNOSIS — Z01.818 PREOP EXAMINATION: ICD-10-CM

## 2019-06-17 DIAGNOSIS — E66.01 MORBID OBESITY WITH BMI OF 60.0-69.9, ADULT (HCC): ICD-10-CM

## 2019-06-17 DIAGNOSIS — K76.0 FATTY LIVER: ICD-10-CM

## 2019-06-17 DIAGNOSIS — R74.8 ELEVATED LIVER ENZYMES: ICD-10-CM

## 2019-06-17 DIAGNOSIS — R71.8 ELEVATED MCV: ICD-10-CM

## 2019-06-17 DIAGNOSIS — D69.6 THROMBOCYTOPENIA (HCC): ICD-10-CM

## 2019-06-17 DIAGNOSIS — Z01.818 PRE-OP EVALUATION: Primary | ICD-10-CM

## 2019-06-17 DIAGNOSIS — K64.9 HEMORRHOIDS, UNSPECIFIED HEMORRHOID TYPE: ICD-10-CM

## 2019-06-17 DIAGNOSIS — I34.1 MVP (MITRAL VALVE PROLAPSE): ICD-10-CM

## 2019-06-17 PROBLEM — J34.2 DEVIATED SEPTUM: Status: RESOLVED | Noted: 2019-01-22 | Resolved: 2019-06-17

## 2019-06-17 PROCEDURE — 82607 VITAMIN B-12: CPT

## 2019-06-17 PROCEDURE — 99244 OFF/OP CNSLTJ NEW/EST MOD 40: CPT | Performed by: FAMILY MEDICINE

## 2019-06-17 PROCEDURE — 80053 COMPREHEN METABOLIC PANEL: CPT

## 2019-06-17 PROCEDURE — 85025 COMPLETE CBC W/AUTO DIFF WBC: CPT

## 2019-06-17 PROCEDURE — 83036 HEMOGLOBIN GLYCOSYLATED A1C: CPT

## 2019-06-17 PROCEDURE — 36415 COLL VENOUS BLD VENIPUNCTURE: CPT

## 2019-06-17 RX ORDER — BLOOD-GLUCOSE METER
EACH MISCELLANEOUS
Qty: 1 KIT | Refills: 0 | Status: CANCELLED | OUTPATIENT
Start: 2019-06-17

## 2019-06-17 RX ORDER — BLOOD-GLUCOSE METER
1 EACH MISCELLANEOUS 2 TIMES DAILY PRN
Qty: 1 KIT | Refills: 0 | Status: SHIPPED | OUTPATIENT
Start: 2019-06-17 | End: 2019-06-17

## 2019-06-17 RX ORDER — BLOOD-GLUCOSE METER
1 EACH MISCELLANEOUS 2 TIMES DAILY PRN
Qty: 1 KIT | Refills: 0 | Status: SHIPPED | OUTPATIENT
Start: 2019-06-17 | End: 2020-01-01 | Stop reason: CLARIF

## 2019-06-17 NOTE — PROGRESS NOTES
Emil Job is a 64year old male who presents for a pre-operative physical exam.   HPI related to surgery:   Emil Job is presenting for surgery per request of Dr. Nancy Draper scheduled for a colonoscopy procedure to be performed by Dr Nancy Draper. Pseudocholinesterase deficiency     NO HX. patient denies.   [facility] will not allow me to check no   • Unspecified sleep apnea     Bipap Premier   • UTI (urinary tract infection)     pt stated that he was on antibiotics for 2 courses, he will follow up with md (!) 433 lb. General: No acute distress. Alert and oriented x 3. HEENT: Moist mucous membranes. EOM-I. PERRL  Neck: No lymphadenopathy. No JVD. No carotid bruits. Respiratory: Clear to auscultation bilaterally. No wheezes. No rhonchi.   Cardiovascular:

## 2019-06-18 ENCOUNTER — PATIENT MESSAGE (OUTPATIENT)
Dept: FAMILY MEDICINE CLINIC | Facility: CLINIC | Age: 59
End: 2019-06-18

## 2019-06-18 ENCOUNTER — OFFICE VISIT (OUTPATIENT)
Dept: INTERNAL MEDICINE CLINIC | Facility: CLINIC | Age: 59
End: 2019-06-18

## 2019-06-18 VITALS
WEIGHT: 315 LBS | DIASTOLIC BLOOD PRESSURE: 80 MMHG | SYSTOLIC BLOOD PRESSURE: 124 MMHG | HEART RATE: 64 BPM | BODY MASS INDEX: 45.1 KG/M2 | RESPIRATION RATE: 18 BRPM | HEIGHT: 70 IN

## 2019-06-18 DIAGNOSIS — E66.01 CLASS 3 SEVERE OBESITY WITH BODY MASS INDEX (BMI) OF 60.0 TO 69.9 IN ADULT, UNSPECIFIED OBESITY TYPE, UNSPECIFIED WHETHER SERIOUS COMORBIDITY PRESENT (HCC): ICD-10-CM

## 2019-06-18 DIAGNOSIS — R74.01 TRANSAMINITIS: ICD-10-CM

## 2019-06-18 DIAGNOSIS — R73.09 OTHER ABNORMAL GLUCOSE: ICD-10-CM

## 2019-06-18 DIAGNOSIS — Z51.81 THERAPEUTIC DRUG MONITORING: Primary | ICD-10-CM

## 2019-06-18 DIAGNOSIS — F41.1 ANXIETY STATE: ICD-10-CM

## 2019-06-18 PROCEDURE — 99214 OFFICE O/P EST MOD 30 MIN: CPT | Performed by: INTERNAL MEDICINE

## 2019-06-18 RX ORDER — METFORMIN HYDROCHLORIDE 750 MG/1
750 TABLET, EXTENDED RELEASE ORAL DAILY
Qty: 30 TABLET | Refills: 1 | Status: SHIPPED | OUTPATIENT
Start: 2019-06-18 | End: 2019-08-17

## 2019-06-18 RX ORDER — PHENTERMINE HYDROCHLORIDE 15 MG/1
15 CAPSULE ORAL EVERY MORNING
Qty: 30 CAPSULE | Refills: 1 | Status: SHIPPED | OUTPATIENT
Start: 2019-06-18 | End: 2020-01-01

## 2019-06-18 NOTE — PROGRESS NOTES
HISTORY OF PRESENT ILLNESS  Patient presents with:  Weight Check: up 4 lbs      Geeta Singh is a 64year old male here for follow up in medical weight loss program.   Up 4 lb from previous visit  Had been traveling a lot since the past few visits.    Re CA 8.7 06/17/2019    OSMOCALC 295 06/17/2019    ALKPHO 132 (H) 06/17/2019    AST 78 (H) 06/17/2019    ALT 68 (H) 06/17/2019    BILT 1.5 06/17/2019    TP 6.5 06/17/2019    ALB 3.0 (L) 06/17/2019    GLOBULT 2.7 06/22/2015    GLOBULIN 3.5 06/17/2019    ALBGL -     [facility]    Class 3 severe obesity with body mass index (BMI) of 60.0 to 69.9 in adult, unspecified obesity type, unspecified whether serious comorbidity present ([facility]  -     OP REFERRAL TO DIETITIAN [facility] ([facility] USE ONLY)  -     OP RE Patient Instructions   Plan:  Continue with medications:   Go to the lab for blood work   Follow up with me in 1 month  Schedule follow up appointments: Ivon Montano (dietitian) or Jennifer Camacho (dietitian)   Check for insurance coverage for dietitian an -hummus with vegetables  -bean dip with vegetables    FRUIT  Low carb fruit options   Raspberries: Half a cup (60 grams) contains 3 grams of carbs. Blackberries: Half a cup (70 grams) contains 4 grams of carbs.   Strawberries: Half a cup (100 grams) contai

## 2019-06-18 NOTE — PATIENT INSTRUCTIONS
Plan:  Continue with medications:   Go to the lab for blood work   Follow up with me in 1 month  Schedule follow up appointments: Kylie Sutherland (dietitian) or Abel March (dietitian)   Check for insurance coverage for dietitian and labwork prior to sche -bean dip with vegetables    FRUIT  Low carb fruit options   Raspberries: Half a cup (60 grams) contains 3 grams of carbs. Blackberries: Half a cup (70 grams) contains 4 grams of carbs. Strawberries: Half a cup (100 grams) contains 6 grams of carbs.   [place]ud[place]

## 2019-06-19 ENCOUNTER — OFFICE VISIT (OUTPATIENT)
Dept: INTERNAL MEDICINE CLINIC | Facility: CLINIC | Age: 59
End: 2019-06-19

## 2019-06-19 VITALS — WEIGHT: 315 LBS | HEIGHT: 70 IN | BODY MASS INDEX: 45.1 KG/M2

## 2019-06-19 DIAGNOSIS — E66.01 CLASS 3 SEVERE OBESITY DUE TO EXCESS CALORIES WITH SERIOUS COMORBIDITY AND BODY MASS INDEX (BMI) OF 60.0 TO 69.9 IN ADULT (HCC): Primary | ICD-10-CM

## 2019-06-19 PROCEDURE — 97802 MEDICAL NUTRITION INDIV IN: CPT | Performed by: DIETITIAN, REGISTERED

## 2019-06-19 NOTE — PROGRESS NOTES
INITIAL OUTPATIENT NUTRITION CONSULTATION    Nutrition Assessment    Medical Diagnosis: Obesity    Physical Findings: None reported    Client Age and Gender: 64year old male    Marital Status and Occupation: , retired       Meds: for patients with CHD or  diabetes and <70 mg/dL for diabetic patients with  known heart disease.           HDL Cholesterol   Date Value Ref Range Status   11/28/2018 49 40 - 59 mg/dL Final     Comment:       Interpretive Information:   An HDL cholesterol < treadmill. Had dry, cracked skin on heels and developed an infection, spent 3 days at [facility] w/ IV anitbiotics    Food Journal: None currently    Spent 45 minutes in consultation with the patient.       Nutrition Intervention/Education:  Comprehensive nutr

## 2019-06-19 NOTE — TELEPHONE ENCOUNTER
From: Jarrett Forbes  To: Meena Pinto MD  Sent: 6/18/2019 9:52 PM CDT  Subject: Test Results Question    I have a question about HEMOGLOBIN A1C resulted on 6/17/19, 1:32 PM.    I don't quite understand the reading.  I am use to reading the Contour mahi

## 2019-06-20 DIAGNOSIS — R73.09 OTHER ABNORMAL GLUCOSE: ICD-10-CM

## 2019-06-20 DIAGNOSIS — D69.6 THROMBOCYTOPENIA (HCC): Primary | ICD-10-CM

## 2019-06-25 ENCOUNTER — TELEPHONE (OUTPATIENT)
Dept: INTERNAL MEDICINE CLINIC | Facility: CLINIC | Age: 59
End: 2019-06-25

## 2019-07-01 ENCOUNTER — HOSPITAL ENCOUNTER (OUTPATIENT)
Dept: CV DIAGNOSTICS | Facility: HOSPITAL | Age: 59
Discharge: HOME | End: 2019-07-01
Attending: FAMILY MEDICINE
Payer: COMMERCIAL

## 2019-07-01 ENCOUNTER — OFFICE VISIT (OUTPATIENT)
Dept: HEMATOLOGY/ONCOLOGY | Facility: HOSPITAL | Age: 59
End: 2019-07-01
Attending: INTERNAL MEDICINE
Payer: COMMERCIAL

## 2019-07-01 VITALS
DIASTOLIC BLOOD PRESSURE: 89 MMHG | OXYGEN SATURATION: 96 % | BODY MASS INDEX: 45.1 KG/M2 | HEIGHT: 70.24 IN | WEIGHT: 315 LBS | HEART RATE: 80 BPM | TEMPERATURE: 99 F | RESPIRATION RATE: 18 BRPM | SYSTOLIC BLOOD PRESSURE: 158 MMHG

## 2019-07-01 DIAGNOSIS — K76.0 FATTY LIVER: ICD-10-CM

## 2019-07-01 DIAGNOSIS — D69.6 THROMBOCYTOPENIA (HCC): Primary | ICD-10-CM

## 2019-07-01 DIAGNOSIS — I34.1 MVP (MITRAL VALVE PROLAPSE): ICD-10-CM

## 2019-07-01 DIAGNOSIS — R74.01 TRANSAMINITIS: ICD-10-CM

## 2019-07-01 PROBLEM — L72.9 SKIN CYST: Status: RESOLVED | Noted: 2019-01-23 | Resolved: 2019-07-01

## 2019-07-01 PROBLEM — L03.116 CELLULITIS OF LEFT LOWER EXTREMITY: Status: RESOLVED | Noted: 2018-06-06 | Resolved: 2019-07-01

## 2019-07-01 LAB
BASOPHILS # BLD AUTO: 0.04 X10(3) UL (ref 0–0.2)
BASOPHILS NFR BLD AUTO: 0.9 %
DEPRECATED RDW RBC AUTO: 55.1 FL (ref 35.1–46.3)
EOSINOPHIL # BLD AUTO: 0.21 X10(3) UL (ref 0–0.7)
EOSINOPHIL NFR BLD AUTO: 4.7 %
ERYTHROCYTE [DISTWIDTH] IN BLOOD BY AUTOMATED COUNT: 14.3 % (ref 11–15)
FOLATE SERPL-MCNC: 12.7 NG/ML (ref 8.7–?)
HCT VFR BLD AUTO: 45 % (ref 39–53)
HGB BLD-MCNC: 15.5 G/DL (ref 13–17.5)
IMM GRANULOCYTES # BLD AUTO: 0.01 X10(3) UL (ref 0–1)
IMM GRANULOCYTES NFR BLD: 0.2 %
LYMPHOCYTES # BLD AUTO: 1.7 X10(3) UL (ref 1–4)
LYMPHOCYTES NFR BLD AUTO: 37.9 %
MCH RBC QN AUTO: 35.8 PG (ref 26–34)
MCHC RBC AUTO-ENTMCNC: 34.4 G/DL (ref 31–37)
MCV RBC AUTO: 103.9 FL (ref 80–100)
MONOCYTES # BLD AUTO: 0.68 X10(3) UL (ref 0.1–1)
MONOCYTES NFR BLD AUTO: 15.2 %
NEUTROPHILS # BLD AUTO: 1.84 X10 (3) UL (ref 1.5–7.7)
NEUTROPHILS # BLD AUTO: 1.84 X10(3) UL (ref 1.5–7.7)
NEUTROPHILS NFR BLD AUTO: 41.1 %
PLATELET # BLD AUTO: 133 10(3)UL (ref 150–450)
RBC # BLD AUTO: 4.33 X10(6)UL (ref 4.3–5.7)
WBC # BLD AUTO: 4.5 X10(3) UL (ref 4–11)

## 2019-07-01 PROCEDURE — 93306 TTE W/DOPPLER COMPLETE: CPT | Performed by: FAMILY MEDICINE

## 2019-07-01 PROCEDURE — 99245 OFF/OP CONSLTJ NEW/EST HI 55: CPT | Performed by: INTERNAL MEDICINE

## 2019-07-01 NOTE — PROGRESS NOTES
Hematology Initial Consultation Note    Patient Name: Jarrett Forbes  Medical Record Number: [id number]    YOB: 1960   Date of Consultation: 7/1/2019   PCP: Dr. Gen Garza   Other providers: Dr. Liliya Hillman (GI)    Reason for Consultat EPIC will not allow me to check no   • Stress    • Unspecified sleep apnea     Bipap Premier   • UTI (urinary tract infection)     pt stated that he was on antibiotics for 2 courses, he will follow up with md and inform Dr Edwar Anguiano as to outcome   • Visual i Relation Age of Onset   • Diabetes Mother      Review of Systems:  A 10-point ROS was done with pertinent positives and negative per the HPI    Vital Signs:  Height: 178.4 cm (5' 10.24\") (07/01 1352)  Weight: 195.5 kg (431 lb) (07/01 1352)  BSA (Calculate (H) 06/17/2019    ALT 68 (H) 06/17/2019    BILT 1.5 06/17/2019    TP 6.5 06/17/2019    ALB 3.0 (L) 06/17/2019    GLOBULT 2.7 06/22/2015    GLOBULIN 3.5 06/17/2019    ALBGLOBRAT 1.4 06/22/2015     06/17/2019    K 3.9 06/17/2019     06/17/2019 splenorenal shunt identified. 3. Normal directional flow in hepatic and visualized portal veins. The right portal vein is not visualized. CT a/p 8/18/17: FINDINGS:  LUNG BASES:  Dependent atelectasis bilaterally. LIVER:  Normal in shape and contour.

## 2019-07-01 NOTE — PROGRESS NOTES
Education Record    Learner:  Patient    Disease / Diagnosis:thrombocytopenia    Barriers / Limitations:  None   Comments:    Method:  Brief focused   Comments:    General Topics:  Plan of care reviewed   Comments:    Outcome:  Shows understanding   Commen

## 2019-07-02 LAB — COPPER, SERUM: 103 UG/DL

## 2019-07-03 ENCOUNTER — TELEPHONE (OUTPATIENT)
Dept: HEMATOLOGY/ONCOLOGY | Facility: HOSPITAL | Age: 59
End: 2019-07-03

## 2019-07-03 NOTE — TELEPHONE ENCOUNTER
I called Ana Cowan back with results of labs today. His folic acid, copper, and HIV tests are unremarkable, therefore will conclude his ongoing mild thrombocytopenia is related to his underlying liver dysfunction which stems from fatty liver disease.   He will

## 2019-07-09 ENCOUNTER — ANESTHESIA EVENT (OUTPATIENT)
Dept: ENDOSCOPY | Facility: HOSPITAL | Age: 59
End: 2019-07-09
Payer: COMMERCIAL

## 2019-07-09 ENCOUNTER — HOSPITAL ENCOUNTER (OUTPATIENT)
Facility: HOSPITAL | Age: 59
Setting detail: HOSPITAL OUTPATIENT SURGERY
Discharge: HOME | End: 2019-07-09
Attending: INTERNAL MEDICINE | Admitting: INTERNAL MEDICINE
Payer: COMMERCIAL

## 2019-07-09 ENCOUNTER — ANESTHESIA (OUTPATIENT)
Dept: ENDOSCOPY | Facility: HOSPITAL | Age: 59
End: 2019-07-09
Payer: COMMERCIAL

## 2019-07-09 VITALS
HEIGHT: 70 IN | SYSTOLIC BLOOD PRESSURE: 127 MMHG | OXYGEN SATURATION: 93 % | BODY MASS INDEX: 45.1 KG/M2 | TEMPERATURE: 99 F | WEIGHT: 315 LBS | RESPIRATION RATE: 18 BRPM | HEART RATE: 69 BPM | DIASTOLIC BLOOD PRESSURE: 72 MMHG

## 2019-07-09 DIAGNOSIS — Z86.010 HISTORY OF COLON POLYPS: ICD-10-CM

## 2019-07-09 PROCEDURE — 0DJD8ZZ INSPECTION OF LOWER INTESTINAL TRACT, VIA NATURAL OR ARTIFICIAL OPENING ENDOSCOPIC: ICD-10-PCS | Performed by: INTERNAL MEDICINE

## 2019-07-09 RX ORDER — NALOXONE HYDROCHLORIDE 0.4 MG/ML
80 INJECTION, SOLUTION INTRAMUSCULAR; INTRAVENOUS; SUBCUTANEOUS AS NEEDED
Status: DISCONTINUED | OUTPATIENT
Start: 2019-07-09 | End: 2019-07-09

## 2019-07-09 RX ORDER — DEXTROSE MONOHYDRATE 25 G/50ML
50 INJECTION, SOLUTION INTRAVENOUS
Status: DISCONTINUED | OUTPATIENT
Start: 2019-07-09 | End: 2019-07-09

## 2019-07-09 RX ORDER — SODIUM CHLORIDE, SODIUM LACTATE, POTASSIUM CHLORIDE, CALCIUM CHLORIDE 600; 310; 30; 20 MG/100ML; MG/100ML; MG/100ML; MG/100ML
INJECTION, SOLUTION INTRAVENOUS CONTINUOUS
Status: DISCONTINUED | OUTPATIENT
Start: 2019-07-09 | End: 2019-07-09

## 2019-07-09 NOTE — H&P
[address]  Patient Status:  Hospital Outpatient Surgery    4/15/1960 MRN [id number]   [facility] ENDOSCOPY Attending Jennie Calzada MD   Date 2019 PCP Ming Lo MD     CC: H/o colon polyps lactated ringers infusion, , Intravenous, Continuous    Physical Exam:    Blood pressure 143/71, pulse 73, temperature 99 °F (37.2 °C), temperature source Oral, resp. rate 18, height 70\", weight (!) 430 lb, SpO2 93 %.     General: Appears alert, oriented x

## 2019-07-09 NOTE — ANESTHESIA POSTPROCEDURE EVALUATION
[address] Patient Status:  Hospital Outpatient Surgery   Age/Gender 64year old male MRN [id number]   Location [facility]. Attending Noelle Portillo MD   Hosp Day # 0 PCP Mayuri Gonzalez MD       Anesthesia Post-op Not

## 2019-07-09 NOTE — ANESTHESIA PREPROCEDURE EVALUATION
PRE-OP EVALUATION    Patient Name: Emil Job    Pre-op Diagnosis: History of colon polyps [Z86.010]    Procedure(s):  Colonoscopy    Surgeon(s) and Role:     Angie Arora MD - Primary    Pre-op vitals reviewed.   Temp: 99 °F (37.2 °C)  Pulse: Lab Results   Component Value Date     06/17/2019    K 3.9 06/17/2019     06/17/2019    CO2 28.0 06/17/2019    BUN 12 06/17/2019    CREATSERUM 0.64 (L) 06/17/2019     (H) 06/17/2019    CA 8.7 06/17/2019            Airway      Mallamp

## 2019-07-09 NOTE — ANESTHESIA POSTPROCEDURE EVALUATION
[address] Patient Status:  Hospital Outpatient Surgery   Age/Gender 64year old male MRN [id number]   Location 118 [facility]. Attending Luca Steele MD   Hosp Day # 0 PCP Katherine Colin MD       Anesthesia Post-op Not

## 2019-07-09 NOTE — OPERATIVE REPORT
Rox Duvall Patient Status:  Hospital Outpatient Surgery    4/15/1960 MRN [id number]   [facility] ENDOSCOPY Attending Zara Aburto MD   Date 2019 PCP Olive Santiago MD     PREOPERATIVE DIAGNOSIS/INDICATION: H/o polyps  POSTOPE

## 2019-07-18 ENCOUNTER — LAB ENCOUNTER (OUTPATIENT)
Dept: LAB | Age: 59
End: 2019-07-18
Attending: FAMILY MEDICINE
Payer: COMMERCIAL

## 2019-07-18 DIAGNOSIS — R39.9 SYMPTOM INVOLVING BLADDER: ICD-10-CM

## 2019-07-18 DIAGNOSIS — R71.8 ELEVATED MCV: ICD-10-CM

## 2019-07-18 LAB
FOLATE SERPL-MCNC: 7.3 NG/ML (ref 8.7–?)
VIT B12 SERPL-MCNC: 525 PG/ML (ref 193–986)

## 2019-07-18 PROCEDURE — 82607 VITAMIN B-12: CPT

## 2019-07-18 PROCEDURE — 87086 URINE CULTURE/COLONY COUNT: CPT

## 2019-07-18 PROCEDURE — 82746 ASSAY OF FOLIC ACID SERUM: CPT

## 2019-07-18 PROCEDURE — 36415 COLL VENOUS BLD VENIPUNCTURE: CPT

## 2019-07-22 DIAGNOSIS — Z13.220 SCREENING FOR LIPID DISORDERS: ICD-10-CM

## 2019-07-22 DIAGNOSIS — Z13.228 SCREENING FOR ENDOCRINE, NUTRITIONAL, METABOLIC AND IMMUNITY DISORDER: ICD-10-CM

## 2019-07-22 DIAGNOSIS — Z13.29 SCREENING FOR THYROID DISORDER: ICD-10-CM

## 2019-07-22 DIAGNOSIS — E53.8 FOLATE DEFICIENCY: Primary | ICD-10-CM

## 2019-07-22 DIAGNOSIS — Z13.0 SCREENING FOR ENDOCRINE, NUTRITIONAL, METABOLIC AND IMMUNITY DISORDER: ICD-10-CM

## 2019-07-22 DIAGNOSIS — Z12.5 ENCOUNTER FOR SCREENING FOR MALIGNANT NEOPLASM OF PROSTATE: ICD-10-CM

## 2019-07-22 DIAGNOSIS — D69.6 THROMBOCYTOPENIA (HCC): ICD-10-CM

## 2019-07-22 DIAGNOSIS — Z13.21 SCREENING FOR ENDOCRINE, NUTRITIONAL, METABOLIC AND IMMUNITY DISORDER: ICD-10-CM

## 2019-07-22 DIAGNOSIS — Z13.29 SCREENING FOR ENDOCRINE, NUTRITIONAL, METABOLIC AND IMMUNITY DISORDER: ICD-10-CM

## 2019-07-31 ENCOUNTER — OFFICE VISIT (OUTPATIENT)
Dept: INTERNAL MEDICINE CLINIC | Facility: CLINIC | Age: 59
End: 2019-07-31

## 2019-07-31 VITALS
WEIGHT: 315 LBS | HEIGHT: 70 IN | HEART RATE: 72 BPM | SYSTOLIC BLOOD PRESSURE: 118 MMHG | DIASTOLIC BLOOD PRESSURE: 76 MMHG | BODY MASS INDEX: 45.1 KG/M2

## 2019-07-31 DIAGNOSIS — Z51.81 THERAPEUTIC DRUG MONITORING: Primary | ICD-10-CM

## 2019-07-31 DIAGNOSIS — G47.33 OSA TREATED WITH BIPAP: ICD-10-CM

## 2019-07-31 DIAGNOSIS — K76.0 FATTY LIVER: ICD-10-CM

## 2019-07-31 DIAGNOSIS — E66.01 CLASS 3 SEVERE OBESITY DUE TO EXCESS CALORIES WITH SERIOUS COMORBIDITY AND BODY MASS INDEX (BMI) OF 60.0 TO 69.9 IN ADULT (HCC): ICD-10-CM

## 2019-07-31 PROCEDURE — 99214 OFFICE O/P EST MOD 30 MIN: CPT | Performed by: INTERNAL MEDICINE

## 2019-07-31 NOTE — PROGRESS NOTES
HISTORY OF PRESENT ILLNESS  Patient presents with:  Weight Check: down 2#  Pt did not start meds yet had colonscopy was incomplete has to repeat      Normarene Singh is a 64year old male here for follow up in medical weight loss program.   Down 2 lb   Adam Dear GFRAA 126 06/17/2019    CA 8.7 06/17/2019    OSMOCALC 295 06/17/2019    ALKPHO 132 (H) 06/17/2019    AST 78 (H) 06/17/2019    ALT 68 (H) 06/17/2019    BILT 1.5 06/17/2019    TP 6.5 06/17/2019    ALB 3.0 (L) 06/17/2019    GLOBULT 2.7 06/22/2015    GLOBULIN prior to visit.      ASSESSMENT  Analyzed weight data:       Diagnoses and all orders for this visit:    Therapeutic drug monitoring    Class 3 severe obesity due to excess calories with serious comorbidity and body mass index (BMI) of 60.0 to 69.9 in adult

## 2019-08-06 ENCOUNTER — OFFICE VISIT (OUTPATIENT)
Dept: INTERNAL MEDICINE CLINIC | Facility: CLINIC | Age: 59
End: 2019-08-06

## 2019-08-06 VITALS
BODY MASS INDEX: 45.1 KG/M2 | WEIGHT: 315 LBS | DIASTOLIC BLOOD PRESSURE: 80 MMHG | HEIGHT: 70 IN | RESPIRATION RATE: 16 BRPM | SYSTOLIC BLOOD PRESSURE: 122 MMHG | HEART RATE: 80 BPM

## 2019-08-06 DIAGNOSIS — Z51.81 THERAPEUTIC DRUG MONITORING: Primary | ICD-10-CM

## 2019-08-06 DIAGNOSIS — E66.01 CLASS 3 SEVERE OBESITY DUE TO EXCESS CALORIES WITH SERIOUS COMORBIDITY AND BODY MASS INDEX (BMI) OF 60.0 TO 69.9 IN ADULT (HCC): ICD-10-CM

## 2019-08-06 DIAGNOSIS — K76.0 FATTY LIVER: ICD-10-CM

## 2019-08-06 DIAGNOSIS — G47.33 OSA TREATED WITH BIPAP: ICD-10-CM

## 2019-08-06 PROCEDURE — 99214 OFFICE O/P EST MOD 30 MIN: CPT | Performed by: INTERNAL MEDICINE

## 2019-08-06 NOTE — PROGRESS NOTES
HISTORY OF PRESENT ILLNESS  Patient presents with:  Weight Check: up 6 lb      Ronette Ahumada is a 64year old male here for follow up in medical weight loss program.   Gained 6 lb from previous   Met some friends from [facility] and had trouble with tracking w GLOBULT 2.7 06/22/2015    GLOBULIN 3.5 06/17/2019    ALBGLOBRAT 1.4 06/22/2015     06/17/2019    K 3.9 06/17/2019     06/17/2019    CO2 28.0 06/17/2019     Lab Results   Component Value Date     06/17/2019    A1C 5.5 06/17/2019     La index (BMI) of 60.0 to 69.9 in adult (HCC)    VENKATA treated with BiPAP    Fatty liver        PLAN  Weight max: 439 lb on 2/28/19  Reconsult: 431 lb on 6/18/19   Total face to face time was 26 minutes, more than 50% of the time was spent in counseling and/or

## 2019-08-19 RX ORDER — METFORMIN HYDROCHLORIDE 750 MG/1
750 TABLET, EXTENDED RELEASE ORAL DAILY
Qty: 30 TABLET | Refills: 2 | Status: SHIPPED | OUTPATIENT
Start: 2019-08-19 | End: 2020-01-01

## 2019-09-23 ENCOUNTER — OFFICE VISIT (OUTPATIENT)
Dept: FAMILY MEDICINE CLINIC | Facility: CLINIC | Age: 59
End: 2019-09-23

## 2019-09-23 VITALS
HEIGHT: 70 IN | DIASTOLIC BLOOD PRESSURE: 80 MMHG | TEMPERATURE: 98 F | SYSTOLIC BLOOD PRESSURE: 138 MMHG | OXYGEN SATURATION: 96 % | HEART RATE: 72 BPM | WEIGHT: 315 LBS | RESPIRATION RATE: 18 BRPM | BODY MASS INDEX: 45.1 KG/M2

## 2019-09-23 DIAGNOSIS — L03.116 CELLULITIS OF LEFT LOWER EXTREMITY: Primary | ICD-10-CM

## 2019-09-23 PROCEDURE — 99213 OFFICE O/P EST LOW 20 MIN: CPT | Performed by: NURSE PRACTITIONER

## 2019-09-23 RX ORDER — CEPHALEXIN 500 MG/1
500 CAPSULE ORAL 4 TIMES DAILY
Qty: 40 CAPSULE | Refills: 0 | Status: SHIPPED | OUTPATIENT
Start: 2019-09-23 | End: 2019-10-03

## 2019-09-23 NOTE — PROGRESS NOTES
CHIEF COMPLAINT:   Patient presents with: Other: dry yellow patchy skin on L heel, history of cellulitis x noticed last night      HPI:     Jorge Valenzuela is a 64year old male who presents with concerns of left leg cellulitis.  Patient first noticed symp • Mitral valve prolapse     no symptoms or treatment, pcp monitors   • Obesity 12/13/2011   • Prediabetes    • Pseudocholinesterase deficiency     NO HX. patient denies. Unable to click no in EPIC.    • Stress    • Unspecified sleep apnea     Bipap Premier ASSESSMENT:  Cellulitis of left lower extremity  (primary encounter diagnosis)    PLAN: Skin care discussed with patient. Instructions and Comfort Care as listed in Patient Instructions. Medication as below.  Advised to go to ER for any worsening symptom · Wash your hands often to prevent spreading the infection. In the future, wash your hands before and after you touch cuts, scratches, or bandages.  This will help prevent infection.   When to call your healthcare provider  Call your healthcare provider ri

## 2019-09-25 ENCOUNTER — OFFICE VISIT (OUTPATIENT)
Dept: FAMILY MEDICINE CLINIC | Facility: CLINIC | Age: 59
End: 2019-09-25

## 2019-09-25 VITALS
RESPIRATION RATE: 18 BRPM | TEMPERATURE: 98 F | HEART RATE: 82 BPM | SYSTOLIC BLOOD PRESSURE: 138 MMHG | DIASTOLIC BLOOD PRESSURE: 80 MMHG

## 2019-09-25 DIAGNOSIS — K76.0 FATTY LIVER: ICD-10-CM

## 2019-09-25 DIAGNOSIS — L03.116 LEFT LEG CELLULITIS: ICD-10-CM

## 2019-09-25 DIAGNOSIS — E66.01 MORBID OBESITY WITH BMI OF 60.0-69.9, ADULT (HCC): Primary | ICD-10-CM

## 2019-09-25 DIAGNOSIS — D69.6 THROMBOCYTOPENIA (HCC): ICD-10-CM

## 2019-09-25 PROBLEM — J34.9 SINUS DISEASE: Status: RESOLVED | Noted: 2019-01-22 | Resolved: 2019-09-25

## 2019-09-25 PROCEDURE — 99215 OFFICE O/P EST HI 40 MIN: CPT | Performed by: FAMILY MEDICINE

## 2019-09-28 NOTE — PROGRESS NOTES
Chief Complaint:   Patient presents with: Follow - Up    HPI:   This is a 64year old male presenting for annual physical and  left leg swelling, some improvement with steroid and also has seen dr. Iraida Braswell.     Patient has a history of high BMI of 60 needs infection)     pt stated that he was on antibiotics for 2 courses, he will follow up with md and inform Dr Pickard Speak as to outcome   • Visual impairment     glasses   • Vitamin D deficiency    • Wears glasses    • Weight gain      Past Surgical History:   Pro Counseling given: Not Answered       REVIEW OF SYSTEMS:   Review of Systems   Constitutional: Negative for chills, diaphoresis, fatigue and fever.    HENT: Negative for congestion, ear pain, facial swelling, postnasal drip, rhinorrhea, sinus pressure, Nose normal.   Mouth/Throat: Oropharynx is clear and moist. No oropharyngeal exudate or pharynx erythema. Eyes: Pupils are equal, round, and reactive to light. Conjunctivae and EOM are normal. Right eye exhibits no discharge.  Left eye exhibits no dischar of visit spent on education and counseling. Office Follow up visit: 3 months, will likely have surgery with dr. Ora Bernabe around Jan 2020.

## 2019-10-01 DIAGNOSIS — E66.01 MORBID OBESITY WITH BMI OF 60.0-69.9, ADULT (HCC): ICD-10-CM

## 2019-10-01 RX ORDER — LANCETS 30 GAUGE
EACH MISCELLANEOUS
Qty: 100 EACH | Refills: 1 | Status: SHIPPED | OUTPATIENT
Start: 2019-10-01 | End: 2020-01-01

## 2019-10-01 NOTE — TELEPHONE ENCOUNTER
Patient saw Dr. Abundio Hess on 9/25/19 they spoke about a script for Lancets but patient states this wasn't called to pharmacy. He would like that to be filled. Please Advise. Thank you.

## 2019-11-15 ENCOUNTER — TELEPHONE (OUTPATIENT)
Dept: FAMILY MEDICINE CLINIC | Facility: CLINIC | Age: 59
End: 2019-11-15

## 2019-11-15 DIAGNOSIS — J00 ACUTE NASOPHARYNGITIS: ICD-10-CM

## 2019-11-15 NOTE — TELEPHONE ENCOUNTER
Pt stopped in office and was asking if he needed a prior EKG before his colonoscopy on Tuesday 11/19 as he had one in the past prior to this procedure.     He also is asking for a refill on his hydrocortisone acetate suppositories    Spoke to Dr. Anastacio Collazo an

## 2019-11-18 RX ORDER — MONTELUKAST SODIUM 10 MG/1
TABLET ORAL
Qty: 90 TABLET | Refills: 0 | Status: SHIPPED | OUTPATIENT
Start: 2019-11-18 | End: 2020-01-01

## 2019-11-19 ENCOUNTER — ANESTHESIA (OUTPATIENT)
Dept: ENDOSCOPY | Facility: HOSPITAL | Age: 59
End: 2019-11-19
Payer: COMMERCIAL

## 2019-11-19 ENCOUNTER — HOSPITAL ENCOUNTER (OUTPATIENT)
Facility: HOSPITAL | Age: 59
Setting detail: HOSPITAL OUTPATIENT SURGERY
Discharge: HOME | End: 2019-11-19
Attending: INTERNAL MEDICINE | Admitting: INTERNAL MEDICINE
Payer: COMMERCIAL

## 2019-11-19 ENCOUNTER — ANESTHESIA EVENT (OUTPATIENT)
Dept: ENDOSCOPY | Facility: HOSPITAL | Age: 59
End: 2019-11-19
Payer: COMMERCIAL

## 2019-11-19 VITALS
TEMPERATURE: 98 F | OXYGEN SATURATION: 97 % | BODY MASS INDEX: 46.65 KG/M2 | HEART RATE: 81 BPM | SYSTOLIC BLOOD PRESSURE: 159 MMHG | DIASTOLIC BLOOD PRESSURE: 64 MMHG | HEIGHT: 69 IN | WEIGHT: 315 LBS | RESPIRATION RATE: 18 BRPM

## 2019-11-19 DIAGNOSIS — Z86.010 PERSONAL HISTORY OF COLONIC POLYPS: ICD-10-CM

## 2019-11-19 PROCEDURE — 0DBM8ZX EXCISION OF DESCENDING COLON, VIA NATURAL OR ARTIFICIAL OPENING ENDOSCOPIC, DIAGNOSTIC: ICD-10-PCS | Performed by: INTERNAL MEDICINE

## 2019-11-19 PROCEDURE — 0DBL8ZX EXCISION OF TRANSVERSE COLON, VIA NATURAL OR ARTIFICIAL OPENING ENDOSCOPIC, DIAGNOSTIC: ICD-10-PCS | Performed by: INTERNAL MEDICINE

## 2019-11-19 PROCEDURE — 88305 TISSUE EXAM BY PATHOLOGIST: CPT | Performed by: INTERNAL MEDICINE

## 2019-11-19 PROCEDURE — 0DBP8ZX EXCISION OF RECTUM, VIA NATURAL OR ARTIFICIAL OPENING ENDOSCOPIC, DIAGNOSTIC: ICD-10-PCS | Performed by: INTERNAL MEDICINE

## 2019-11-19 RX ORDER — SODIUM CHLORIDE, SODIUM LACTATE, POTASSIUM CHLORIDE, CALCIUM CHLORIDE 600; 310; 30; 20 MG/100ML; MG/100ML; MG/100ML; MG/100ML
INJECTION, SOLUTION INTRAVENOUS CONTINUOUS
Status: CANCELLED | OUTPATIENT
Start: 2019-11-19

## 2019-11-19 RX ORDER — SODIUM CHLORIDE, SODIUM LACTATE, POTASSIUM CHLORIDE, CALCIUM CHLORIDE 600; 310; 30; 20 MG/100ML; MG/100ML; MG/100ML; MG/100ML
INJECTION, SOLUTION INTRAVENOUS CONTINUOUS
Status: DISCONTINUED | OUTPATIENT
Start: 2019-11-19 | End: 2019-11-19

## 2019-11-19 RX ORDER — LIDOCAINE HYDROCHLORIDE 10 MG/ML
INJECTION, SOLUTION EPIDURAL; INFILTRATION; INTRACAUDAL; PERINEURAL AS NEEDED
Status: DISCONTINUED | OUTPATIENT
Start: 2019-11-19 | End: 2019-11-19 | Stop reason: SURG

## 2019-11-19 RX ORDER — NALOXONE HYDROCHLORIDE 0.4 MG/ML
80 INJECTION, SOLUTION INTRAMUSCULAR; INTRAVENOUS; SUBCUTANEOUS AS NEEDED
Status: CANCELLED | OUTPATIENT
Start: 2019-11-19 | End: 2019-11-19

## 2019-11-19 RX ORDER — DEXTROSE MONOHYDRATE 25 G/50ML
50 INJECTION, SOLUTION INTRAVENOUS
Status: CANCELLED | OUTPATIENT
Start: 2019-11-19

## 2019-11-19 RX ADMIN — LIDOCAINE HYDROCHLORIDE 50 MG: 10 INJECTION, SOLUTION EPIDURAL; INFILTRATION; INTRACAUDAL; PERINEURAL at 11:48:00

## 2019-11-19 NOTE — ANESTHESIA POSTPROCEDURE EVALUATION
[address] Patient Status:  Hospital Outpatient Surgery   Age/Gender 64year old male MRN [id number]   Location 118 [facility]. Attending Edilma Neumann MD   Hosp Day # 0 PCP Abraham Romero MD       Anesthesia Post-op Not

## 2019-11-19 NOTE — ANESTHESIA PREPROCEDURE EVALUATION
PRE-OP EVALUATION    Patient Name: Omar Josue    Pre-op Diagnosis: Personal history of colonic polyps [Z86.010]    Procedure(s):  COLONOSCOPY  LOWER SINGLE BALLOON ENTEROSCOPY      Surgeon(s) and Role:     Palak Rollins MD - Primary    Pre-op v Pulmonary                    (+) sleep apnea       Neuro/Psych      (+) depression  (+) anxiety                            Past Surgical History:   Procedure Laterality Date   • COLONOSCOPY N/A 7/9/2019    Performed by Joseph Toussaint MD at [facility] ENDOSCO

## 2019-11-19 NOTE — OPERATIVE REPORT
Gris Bedoya Patient Status:  Hospital Outpatient Surgery    4/15/1960 MRN [id number]   [facility] ENDOSCOPY Attending Raul Duncan MD   Date 2019 PCP Danny Devries MD     PREOPERATIVE DIAGNOSIS/INDICATION: H/o polyps  POSTO Colonoscopy/polypectomy precautions, watch for bleeding, infection, perforation, adverse drug reactions   - Follow biopsies.  - Repeat colonoscopy at [facility] with single balloon enteroscopy assisted in 3 years.     Albert Jimenez  11/19/2019  12:2

## 2019-11-19 NOTE — H&P
[address] [place]  Patient Status:  Hospital Outpatient Surgery    4/15/1960 MRN [id number]   [facility] ENDOSCOPY Attending Davina Torres MD   Date 2019 PCP Adrianne Spears MD     CC: H/o colon polyp Allergies    Medications:    Current Facility-Administered Medications:   •  lactated ringers infusion, , Intravenous, Continuous    Physical Exam:    Blood pressure (!) 163/88, pulse 81, temperature 98.2 °F (36.8 °C), temperature source Oral, resp.  rate 1

## 2019-11-21 ENCOUNTER — OFFICE VISIT (OUTPATIENT)
Dept: FAMILY MEDICINE CLINIC | Facility: CLINIC | Age: 59
End: 2019-11-21

## 2019-11-21 VITALS
DIASTOLIC BLOOD PRESSURE: 68 MMHG | SYSTOLIC BLOOD PRESSURE: 122 MMHG | RESPIRATION RATE: 20 BRPM | HEIGHT: 69 IN | BODY MASS INDEX: 46.65 KG/M2 | TEMPERATURE: 99 F | OXYGEN SATURATION: 98 % | WEIGHT: 315 LBS | HEART RATE: 67 BPM

## 2019-11-21 DIAGNOSIS — L03.116 CELLULITIS OF LEFT LEG: Primary | ICD-10-CM

## 2019-11-21 PROCEDURE — 99213 OFFICE O/P EST LOW 20 MIN: CPT | Performed by: NURSE PRACTITIONER

## 2019-11-21 RX ORDER — CEPHALEXIN 500 MG/1
500 CAPSULE ORAL 4 TIMES DAILY
Qty: 40 CAPSULE | Refills: 0 | Status: SHIPPED | OUTPATIENT
Start: 2019-11-21 | End: 2019-01-01
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
## 2019-11-22 NOTE — PATIENT INSTRUCTIONS
Go to the ER for any spreading of redness, fever, or swelling of leg. Follow up with your doctor early next week for reevaluation. Discharge Instructions for Cellulitis  You have been diagnosed with cellulitis.  This is an infection in the deepest lay · Redness that gets worse in or around the infected area, particularly if the area of redness expands to a wider area  · Shaking chills  · Swelling of the infected area  · Vomiting  Date Last Reviewed: 8/1/2016  © 1935-1304 [employer].  800 T

## 2019-11-25 NOTE — PROGRESS NOTES
Date: 2019    To: Nima King  : 4/15/1960    I hope this letter finds you doing well. I am writing to inform you of the following:        The biopsies obtained from your recent colonoscopy indicate that the polyps were adenomas which, althoug

## 2019-12-27 NOTE — PROGRESS NOTES
CHIEF COMPLAINT:   Patient presents with:  Pain: x last night chills, LT leg and foot      HPI:     Karthikeyan Maier is a 64year old male who presents with concerns of cellulitis on left leg. Patient first noticed symptoms last night.   Reports erythema, in • metFORMIN HCl  MG Oral Tablet 24 Hr Take 1 tablet (750 mg total) by mouth daily. Not started; will be used for weight loss.  (Patient not taking: Reported on 12/26/2019 ) 30 tablet 2      Past Medical History:   Diagnosis Date   • Anxiety    • Anaid Aparicio GENERAL: well developed, well nourished,in no apparent distress  SKIN: erythema to right anterior lower leg extending from right anterior anklt about 1/3 the way up the the right shin. No drainage.    HEAD: atraumatic, normocephalic  EYES: conjunctiva clear · Take the prescribed antibiotic medicine you are given as directed until it is gone. Take it even if you feel better. It treats the infection and stops it from returning. Not taking all the medicine can make future infections hard to treat.   · Keep the in

## 2019-12-27 NOTE — PATIENT INSTRUCTIONS
Discharge Instructions for Cellulitis  You have been diagnosed with cellulitis. This is an infection in the deepest layer of the skin and tissue beneath the skin. In some cases, the infection also affects the muscle. Cellulitis is caused by bacteria.  The · Vomiting  Date Last Reviewed: 8/1/2016  © 4382-6694 [employer]. [facility] [facility], [facility] [place]. All rights reserved. This information is not intended as a substitute for professional medical care.  Always follow your healthcare p

## 2020-01-01 ENCOUNTER — APPOINTMENT (OUTPATIENT)
Dept: CT IMAGING | Facility: HOSPITAL | Age: 60
DRG: 064 | End: 2020-01-01
Attending: NURSE PRACTITIONER
Payer: COMMERCIAL

## 2020-01-01 ENCOUNTER — OFFICE VISIT (OUTPATIENT)
Dept: FAMILY MEDICINE CLINIC | Facility: CLINIC | Age: 60
End: 2020-01-01

## 2020-01-01 ENCOUNTER — APPOINTMENT (OUTPATIENT)
Dept: CV DIAGNOSTICS | Facility: HOSPITAL | Age: 60
DRG: 064 | End: 2020-01-01
Attending: HOSPITALIST
Payer: COMMERCIAL

## 2020-01-01 ENCOUNTER — ANESTHESIA EVENT (OUTPATIENT)
Dept: MEDSURG UNIT | Facility: HOSPITAL | Age: 60
DRG: 064 | End: 2020-01-01
Payer: COMMERCIAL

## 2020-01-01 ENCOUNTER — APPOINTMENT (OUTPATIENT)
Dept: GENERAL RADIOLOGY | Facility: HOSPITAL | Age: 60
DRG: 064 | End: 2020-01-01
Attending: EMERGENCY MEDICINE
Payer: COMMERCIAL

## 2020-01-01 ENCOUNTER — APPOINTMENT (OUTPATIENT)
Dept: ULTRASOUND IMAGING | Facility: HOSPITAL | Age: 60
DRG: 064 | End: 2020-01-01
Attending: STUDENT IN AN ORGANIZED HEALTH CARE EDUCATION/TRAINING PROGRAM
Payer: COMMERCIAL

## 2020-01-01 ENCOUNTER — APPOINTMENT (OUTPATIENT)
Dept: CV DIAGNOSTICS | Facility: HOSPITAL | Age: 60
DRG: 064 | End: 2020-01-01
Attending: INTERNAL MEDICINE
Payer: COMMERCIAL

## 2020-01-01 ENCOUNTER — ANESTHESIA (OUTPATIENT)
Dept: ENDOSCOPY | Facility: HOSPITAL | Age: 60
DRG: 064 | End: 2020-01-01
Payer: COMMERCIAL

## 2020-01-01 ENCOUNTER — NURSE ONLY (OUTPATIENT)
Dept: ELECTROPHYSIOLOGY | Facility: HOSPITAL | Age: 60
DRG: 064 | End: 2020-01-01
Attending: Other
Payer: COMMERCIAL

## 2020-01-01 ENCOUNTER — APPOINTMENT (OUTPATIENT)
Dept: MRI IMAGING | Facility: HOSPITAL | Age: 60
DRG: 064 | End: 2020-01-01
Attending: Other
Payer: COMMERCIAL

## 2020-01-01 ENCOUNTER — APPOINTMENT (OUTPATIENT)
Dept: MRI IMAGING | Facility: HOSPITAL | Age: 60
DRG: 064 | End: 2020-01-01
Attending: NURSE PRACTITIONER
Payer: COMMERCIAL

## 2020-01-01 ENCOUNTER — APPOINTMENT (OUTPATIENT)
Dept: MRI IMAGING | Facility: HOSPITAL | Age: 60
DRG: 064 | End: 2020-01-01
Attending: EMERGENCY MEDICINE
Payer: COMMERCIAL

## 2020-01-01 ENCOUNTER — ANESTHESIA EVENT (OUTPATIENT)
Dept: ENDOSCOPY | Facility: HOSPITAL | Age: 60
DRG: 064 | End: 2020-01-01
Payer: COMMERCIAL

## 2020-01-01 ENCOUNTER — APPOINTMENT (OUTPATIENT)
Dept: CT IMAGING | Facility: HOSPITAL | Age: 60
DRG: 064 | End: 2020-01-01
Attending: Other
Payer: COMMERCIAL

## 2020-01-01 ENCOUNTER — APPOINTMENT (OUTPATIENT)
Dept: GENERAL RADIOLOGY | Facility: HOSPITAL | Age: 60
DRG: 064 | End: 2020-01-01
Attending: INTERNAL MEDICINE
Payer: COMMERCIAL

## 2020-01-01 ENCOUNTER — APPOINTMENT (OUTPATIENT)
Dept: GENERAL RADIOLOGY | Facility: HOSPITAL | Age: 60
DRG: 064 | End: 2020-01-01
Attending: PHYSICIAN ASSISTANT
Payer: COMMERCIAL

## 2020-01-01 ENCOUNTER — APPOINTMENT (OUTPATIENT)
Dept: ULTRASOUND IMAGING | Facility: HOSPITAL | Age: 60
DRG: 064 | End: 2020-01-01
Attending: INTERNAL MEDICINE
Payer: COMMERCIAL

## 2020-01-01 ENCOUNTER — TELEPHONE (OUTPATIENT)
Dept: FAMILY MEDICINE CLINIC | Facility: CLINIC | Age: 60
End: 2020-01-01

## 2020-01-01 ENCOUNTER — APPOINTMENT (OUTPATIENT)
Dept: GENERAL RADIOLOGY | Facility: HOSPITAL | Age: 60
DRG: 064 | End: 2020-01-01
Attending: HOSPITALIST
Payer: COMMERCIAL

## 2020-01-01 ENCOUNTER — ANESTHESIA (OUTPATIENT)
Dept: MEDSURG UNIT | Facility: HOSPITAL | Age: 60
DRG: 064 | End: 2020-01-01
Payer: COMMERCIAL

## 2020-01-01 ENCOUNTER — APPOINTMENT (OUTPATIENT)
Dept: ULTRASOUND IMAGING | Facility: HOSPITAL | Age: 60
DRG: 064 | End: 2020-01-01
Attending: HOSPITALIST
Payer: COMMERCIAL

## 2020-01-01 ENCOUNTER — LAB ENCOUNTER (OUTPATIENT)
Dept: LAB | Age: 60
End: 2020-01-01
Attending: FAMILY MEDICINE
Payer: COMMERCIAL

## 2020-01-01 ENCOUNTER — HOSPITAL ENCOUNTER (INPATIENT)
Facility: HOSPITAL | Age: 60
LOS: 17 days | DRG: 064 | End: 2020-01-01
Attending: EMERGENCY MEDICINE | Admitting: HOSPITALIST
Payer: COMMERCIAL

## 2020-01-01 VITALS
WEIGHT: 315 LBS | RESPIRATION RATE: 16 BRPM | HEART RATE: 78 BPM | BODY MASS INDEX: 46.65 KG/M2 | TEMPERATURE: 98 F | HEIGHT: 69 IN | DIASTOLIC BLOOD PRESSURE: 82 MMHG | SYSTOLIC BLOOD PRESSURE: 138 MMHG

## 2020-01-01 VITALS
RESPIRATION RATE: 24 BRPM | TEMPERATURE: 98 F | WEIGHT: 315 LBS | BODY MASS INDEX: 46.65 KG/M2 | DIASTOLIC BLOOD PRESSURE: 84 MMHG | SYSTOLIC BLOOD PRESSURE: 146 MMHG | HEIGHT: 69 IN | HEART RATE: 68 BPM | OXYGEN SATURATION: 95 %

## 2020-01-01 VITALS
RESPIRATION RATE: 20 BRPM | WEIGHT: 315 LBS | TEMPERATURE: 98 F | SYSTOLIC BLOOD PRESSURE: 132 MMHG | OXYGEN SATURATION: 97 % | DIASTOLIC BLOOD PRESSURE: 84 MMHG | HEIGHT: 69 IN | HEART RATE: 66 BPM | BODY MASS INDEX: 46.65 KG/M2

## 2020-01-01 VITALS
HEIGHT: 69 IN | OXYGEN SATURATION: 91 % | SYSTOLIC BLOOD PRESSURE: 56 MMHG | DIASTOLIC BLOOD PRESSURE: 39 MMHG | BODY MASS INDEX: 46.65 KG/M2 | WEIGHT: 315 LBS | TEMPERATURE: 97 F

## 2020-01-01 DIAGNOSIS — D69.6 THROMBOCYTOPENIA (HCC): ICD-10-CM

## 2020-01-01 DIAGNOSIS — R77.8 ELEVATED TROPONIN: ICD-10-CM

## 2020-01-01 DIAGNOSIS — E66.01 MORBID OBESITY WITH BMI OF 60.0-69.9, ADULT (HCC): ICD-10-CM

## 2020-01-01 DIAGNOSIS — E66.01 MORBID OBESITY WITH BMI OF 60.0-69.9, ADULT (HCC): Primary | ICD-10-CM

## 2020-01-01 DIAGNOSIS — R73.09 OTHER ABNORMAL GLUCOSE: ICD-10-CM

## 2020-01-01 DIAGNOSIS — I16.1 HYPERTENSIVE EMERGENCY WITHOUT CONGESTIVE HEART FAILURE: ICD-10-CM

## 2020-01-01 DIAGNOSIS — L30.9 FOOT DERMATITIS: ICD-10-CM

## 2020-01-01 DIAGNOSIS — L03.116 LEFT LEG CELLULITIS: ICD-10-CM

## 2020-01-01 DIAGNOSIS — Z13.29 SCREENING FOR ENDOCRINE, NUTRITIONAL, METABOLIC AND IMMUNITY DISORDER: ICD-10-CM

## 2020-01-01 DIAGNOSIS — J00 ACUTE NASOPHARYNGITIS: ICD-10-CM

## 2020-01-01 DIAGNOSIS — E53.8 FOLATE DEFICIENCY: ICD-10-CM

## 2020-01-01 DIAGNOSIS — Z13.21 SCREENING FOR ENDOCRINE, NUTRITIONAL, METABOLIC AND IMMUNITY DISORDER: ICD-10-CM

## 2020-01-01 DIAGNOSIS — Z13.228 SCREENING FOR ENDOCRINE, NUTRITIONAL, METABOLIC AND IMMUNITY DISORDER: ICD-10-CM

## 2020-01-01 DIAGNOSIS — Z13.29 SCREENING FOR THYROID DISORDER: ICD-10-CM

## 2020-01-01 DIAGNOSIS — Z13.0 SCREENING FOR ENDOCRINE, NUTRITIONAL, METABOLIC AND IMMUNITY DISORDER: ICD-10-CM

## 2020-01-01 DIAGNOSIS — R73.09 ABNORMAL GLUCOSE: ICD-10-CM

## 2020-01-01 DIAGNOSIS — L03.116 CELLULITIS OF LEFT LOWER EXTREMITY: Primary | ICD-10-CM

## 2020-01-01 DIAGNOSIS — I63.9 ACUTE ISCHEMIC STROKE (HCC): Primary | ICD-10-CM

## 2020-01-01 DIAGNOSIS — Z13.220 SCREENING FOR LIPID DISORDERS: ICD-10-CM

## 2020-01-01 DIAGNOSIS — K76.0 FATTY LIVER: ICD-10-CM

## 2020-01-01 DIAGNOSIS — Z12.5 ENCOUNTER FOR SCREENING FOR MALIGNANT NEOPLASM OF PROSTATE: ICD-10-CM

## 2020-01-01 LAB
ALBUMIN SERPL-MCNC: 3 G/DL (ref 3.4–5)
ALBUMIN/GLOB SERPL: 0.7 {RATIO} (ref 1–2)
ALP LIVER SERPL-CCNC: 153 U/L (ref 45–117)
ALT SERPL-CCNC: 69 U/L (ref 16–61)
ANION GAP SERPL CALC-SCNC: 3 MMOL/L (ref 0–18)
AST SERPL-CCNC: 93 U/L (ref 15–37)
BASOPHILS # BLD AUTO: 0.05 X10(3) UL (ref 0–0.2)
BASOPHILS NFR BLD AUTO: 1 %
BILIRUB SERPL-MCNC: 2.2 MG/DL (ref 0.1–2)
BUN BLD-MCNC: 11 MG/DL (ref 7–18)
BUN/CREAT SERPL: 15.9 (ref 10–20)
CALCIUM BLD-MCNC: 8.5 MG/DL (ref 8.5–10.1)
CHLORIDE SERPL-SCNC: 109 MMOL/L (ref 98–112)
CHOLEST SMN-MCNC: 207 MG/DL (ref ?–200)
CO2 SERPL-SCNC: 27 MMOL/L (ref 21–32)
COMPLEXED PSA SERPL-MCNC: 0.97 NG/ML (ref ?–4)
CREAT BLD-MCNC: 0.69 MG/DL (ref 0.7–1.3)
DEPRECATED RDW RBC AUTO: 56.7 FL (ref 35.1–46.3)
EOSINOPHIL # BLD AUTO: 0.22 X10(3) UL (ref 0–0.7)
EOSINOPHIL NFR BLD AUTO: 4.3 %
ERYTHROCYTE [DISTWIDTH] IN BLOOD BY AUTOMATED COUNT: 14.5 % (ref 11–15)
FOLATE SERPL-MCNC: 13.2 NG/ML (ref 8.7–?)
GLOBULIN PLAS-MCNC: 4.3 G/DL (ref 2.8–4.4)
GLUCOSE BLD-MCNC: 127 MG/DL (ref 70–99)
HCT VFR BLD AUTO: 46 % (ref 39–53)
HDLC SERPL-MCNC: 62 MG/DL (ref 40–59)
HGB BLD-MCNC: 15.9 G/DL (ref 13–17.5)
IMM GRANULOCYTES # BLD AUTO: 0.01 X10(3) UL (ref 0–1)
IMM GRANULOCYTES NFR BLD: 0.2 %
LDLC SERPL CALC-MCNC: 128 MG/DL (ref ?–100)
LYMPHOCYTES # BLD AUTO: 1.84 X10(3) UL (ref 1–4)
LYMPHOCYTES NFR BLD AUTO: 35.8 %
M PROTEIN MFR SERPL ELPH: 7.3 G/DL (ref 6.4–8.2)
MCH RBC QN AUTO: 36.1 PG (ref 26–34)
MCHC RBC AUTO-ENTMCNC: 34.6 G/DL (ref 31–37)
MCV RBC AUTO: 104.5 FL (ref 80–100)
MONOCYTES # BLD AUTO: 0.61 X10(3) UL (ref 0.1–1)
MONOCYTES NFR BLD AUTO: 11.9 %
NEUTROPHILS # BLD AUTO: 2.41 X10 (3) UL (ref 1.5–7.7)
NEUTROPHILS # BLD AUTO: 2.41 X10(3) UL (ref 1.5–7.7)
NEUTROPHILS NFR BLD AUTO: 46.8 %
NONHDLC SERPL-MCNC: 145 MG/DL (ref ?–130)
OSMOLALITY SERPL CALC.SUM OF ELEC: 289 MOSM/KG (ref 275–295)
PATIENT FASTING Y/N/NP: YES
PATIENT FASTING Y/N/NP: YES
PLATELET # BLD AUTO: 144 10(3)UL (ref 150–450)
POTASSIUM SERPL-SCNC: 4.1 MMOL/L (ref 3.5–5.1)
RBC # BLD AUTO: 4.4 X10(6)UL (ref 4.3–5.7)
SODIUM SERPL-SCNC: 139 MMOL/L (ref 136–145)
TRIGL SERPL-MCNC: 84 MG/DL (ref 30–149)
TSI SER-ACNC: 2.46 MIU/ML (ref 0.36–3.74)
VIT B12 SERPL-MCNC: 675 PG/ML (ref 193–986)
VLDLC SERPL CALC-MCNC: 17 MG/DL (ref 0–30)
WBC # BLD AUTO: 5.1 X10(3) UL (ref 4–11)

## 2020-01-01 PROCEDURE — 71045 X-RAY EXAM CHEST 1 VIEW: CPT | Performed by: EMERGENCY MEDICINE

## 2020-01-01 PROCEDURE — 0BH17EZ INSERTION OF ENDOTRACHEAL AIRWAY INTO TRACHEA, VIA NATURAL OR ARTIFICIAL OPENING: ICD-10-PCS | Performed by: ANESTHESIOLOGY

## 2020-01-01 PROCEDURE — 99291 CRITICAL CARE FIRST HOUR: CPT | Performed by: OTHER

## 2020-01-01 PROCEDURE — 93970 EXTREMITY STUDY: CPT | Performed by: INTERNAL MEDICINE

## 2020-01-01 PROCEDURE — 02HV33Z INSERTION OF INFUSION DEVICE INTO SUPERIOR VENA CAVA, PERCUTANEOUS APPROACH: ICD-10-PCS | Performed by: INTERNAL MEDICINE

## 2020-01-01 PROCEDURE — 76942 ECHO GUIDE FOR BIOPSY: CPT | Performed by: ANESTHESIOLOGY

## 2020-01-01 PROCEDURE — 5A09357 ASSISTANCE WITH RESPIRATORY VENTILATION, LESS THAN 24 CONSECUTIVE HOURS, CONTINUOUS POSITIVE AIRWAY PRESSURE: ICD-10-PCS | Performed by: INTERNAL MEDICINE

## 2020-01-01 PROCEDURE — 99232 SBSQ HOSP IP/OBS MODERATE 35: CPT | Performed by: HOSPITALIST

## 2020-01-01 PROCEDURE — 30233N1 TRANSFUSION OF NONAUTOLOGOUS RED BLOOD CELLS INTO PERIPHERAL VEIN, PERCUTANEOUS APPROACH: ICD-10-PCS | Performed by: INTERNAL MEDICINE

## 2020-01-01 PROCEDURE — 70496 CT ANGIOGRAPHY HEAD: CPT | Performed by: OTHER

## 2020-01-01 PROCEDURE — 74018 RADEX ABDOMEN 1 VIEW: CPT | Performed by: PHYSICIAN ASSISTANT

## 2020-01-01 PROCEDURE — 93306 TTE W/DOPPLER COMPLETE: CPT | Performed by: HOSPITALIST

## 2020-01-01 PROCEDURE — 70498 CT ANGIOGRAPHY NECK: CPT | Performed by: NURSE PRACTITIONER

## 2020-01-01 PROCEDURE — 99291 CRITICAL CARE FIRST HOUR: CPT | Performed by: INTERNAL MEDICINE

## 2020-01-01 PROCEDURE — XW043H4 INTRODUCTION OF SYNTHETIC HUMAN ANGIOTENSIN II INTO CENTRAL VEIN, PERCUTANEOUS APPROACH, NEW TECHNOLOGY GROUP 4: ICD-10-PCS | Performed by: INTERNAL MEDICINE

## 2020-01-01 PROCEDURE — 99292 CRITICAL CARE ADDL 30 MIN: CPT | Performed by: INTERNAL MEDICINE

## 2020-01-01 PROCEDURE — 99213 OFFICE O/P EST LOW 20 MIN: CPT | Performed by: PHYSICIAN ASSISTANT

## 2020-01-01 PROCEDURE — 99233 SBSQ HOSP IP/OBS HIGH 50: CPT | Performed by: INTERNAL MEDICINE

## 2020-01-01 PROCEDURE — 99232 SBSQ HOSP IP/OBS MODERATE 35: CPT | Performed by: INTERNAL MEDICINE

## 2020-01-01 PROCEDURE — 70551 MRI BRAIN STEM W/O DYE: CPT | Performed by: NURSE PRACTITIONER

## 2020-01-01 PROCEDURE — 99215 OFFICE O/P EST HI 40 MIN: CPT | Performed by: FAMILY MEDICINE

## 2020-01-01 PROCEDURE — 95720 EEG PHY/QHP EA INCR W/VEEG: CPT | Performed by: OTHER

## 2020-01-01 PROCEDURE — 99233 SBSQ HOSP IP/OBS HIGH 50: CPT | Performed by: OTHER

## 2020-01-01 PROCEDURE — 30233R1 TRANSFUSION OF NONAUTOLOGOUS PLATELETS INTO PERIPHERAL VEIN, PERCUTANEOUS APPROACH: ICD-10-PCS | Performed by: INTERNAL MEDICINE

## 2020-01-01 PROCEDURE — 99238 HOSP IP/OBS DSCHRG MGMT 30/<: CPT | Performed by: INTERNAL MEDICINE

## 2020-01-01 PROCEDURE — 80053 COMPREHEN METABOLIC PANEL: CPT

## 2020-01-01 PROCEDURE — 70496 CT ANGIOGRAPHY HEAD: CPT | Performed by: NURSE PRACTITIONER

## 2020-01-01 PROCEDURE — 84443 ASSAY THYROID STIM HORMONE: CPT

## 2020-01-01 PROCEDURE — 99291 CRITICAL CARE FIRST HOUR: CPT | Performed by: HOSPITALIST

## 2020-01-01 PROCEDURE — 05HY33Z INSERTION OF INFUSION DEVICE INTO UPPER VEIN, PERCUTANEOUS APPROACH: ICD-10-PCS | Performed by: INTERNAL MEDICINE

## 2020-01-01 PROCEDURE — 82607 VITAMIN B-12: CPT

## 2020-01-01 PROCEDURE — 95813 EEG EXTND MNTR 61-119 MIN: CPT | Performed by: OTHER

## 2020-01-01 PROCEDURE — 99233 SBSQ HOSP IP/OBS HIGH 50: CPT | Performed by: HOSPITALIST

## 2020-01-01 PROCEDURE — 99213 OFFICE O/P EST LOW 20 MIN: CPT | Performed by: NURSE PRACTITIONER

## 2020-01-01 PROCEDURE — 71045 X-RAY EXAM CHEST 1 VIEW: CPT | Performed by: HOSPITALIST

## 2020-01-01 PROCEDURE — 0W3P8ZZ CONTROL BLEEDING IN GASTROINTESTINAL TRACT, VIA NATURAL OR ARTIFICIAL OPENING ENDOSCOPIC: ICD-10-PCS | Performed by: INTERNAL MEDICINE

## 2020-01-01 PROCEDURE — 93975 VASCULAR STUDY: CPT | Performed by: STUDENT IN AN ORGANIZED HEALTH CARE EDUCATION/TRAINING PROGRAM

## 2020-01-01 PROCEDURE — 93306 TTE W/DOPPLER COMPLETE: CPT | Performed by: INTERNAL MEDICINE

## 2020-01-01 PROCEDURE — 76700 US EXAM ABDOM COMPLETE: CPT | Performed by: HOSPITALIST

## 2020-01-01 PROCEDURE — 85025 COMPLETE CBC W/AUTO DIFF WBC: CPT

## 2020-01-01 PROCEDURE — B548ZZA ULTRASONOGRAPHY OF SUPERIOR VENA CAVA, GUIDANCE: ICD-10-PCS | Performed by: INTERNAL MEDICINE

## 2020-01-01 PROCEDURE — 30233K1 TRANSFUSION OF NONAUTOLOGOUS FROZEN PLASMA INTO PERIPHERAL VEIN, PERCUTANEOUS APPROACH: ICD-10-PCS | Performed by: INTERNAL MEDICINE

## 2020-01-01 PROCEDURE — 71045 X-RAY EXAM CHEST 1 VIEW: CPT | Performed by: INTERNAL MEDICINE

## 2020-01-01 PROCEDURE — 99255 IP/OBS CONSLTJ NEW/EST HI 80: CPT | Performed by: SURGERY

## 2020-01-01 PROCEDURE — 76700 US EXAM ABDOM COMPLETE: CPT | Performed by: STUDENT IN AN ORGANIZED HEALTH CARE EDUCATION/TRAINING PROGRAM

## 2020-01-01 PROCEDURE — 70551 MRI BRAIN STEM W/O DYE: CPT | Performed by: OTHER

## 2020-01-01 PROCEDURE — 99223 1ST HOSP IP/OBS HIGH 75: CPT | Performed by: HOSPITALIST

## 2020-01-01 PROCEDURE — 5A1955Z RESPIRATORY VENTILATION, GREATER THAN 96 CONSECUTIVE HOURS: ICD-10-PCS | Performed by: INTERNAL MEDICINE

## 2020-01-01 PROCEDURE — 82746 ASSAY OF FOLIC ACID SERUM: CPT

## 2020-01-01 PROCEDURE — 36415 COLL VENOUS BLD VENIPUNCTURE: CPT

## 2020-01-01 PROCEDURE — 70450 CT HEAD/BRAIN W/O DYE: CPT | Performed by: OTHER

## 2020-01-01 PROCEDURE — 80061 LIPID PANEL: CPT

## 2020-01-01 PROCEDURE — 99231 SBSQ HOSP IP/OBS SF/LOW 25: CPT | Performed by: HOSPITALIST

## 2020-01-01 PROCEDURE — 71045 X-RAY EXAM CHEST 1 VIEW: CPT | Performed by: PHYSICIAN ASSISTANT

## 2020-01-01 PROCEDURE — 70553 MRI BRAIN STEM W/O & W/DYE: CPT | Performed by: EMERGENCY MEDICINE

## 2020-01-01 RX ORDER — ALBUMIN (HUMAN) 12.5 G/50ML
50 SOLUTION INTRAVENOUS ONCE
Status: COMPLETED | OUTPATIENT
Start: 2020-01-01 | End: 2020-01-01

## 2020-01-01 RX ORDER — LIDOCAINE HYDROCHLORIDE 10 MG/ML
5 INJECTION, SOLUTION EPIDURAL; INFILTRATION; INTRACAUDAL; PERINEURAL ONCE
Status: DISCONTINUED | OUTPATIENT
Start: 2020-01-01 | End: 2020-01-01

## 2020-01-01 RX ORDER — POTASSIUM CHLORIDE 20 MEQ/1
40 TABLET, EXTENDED RELEASE ORAL EVERY 4 HOURS
Status: DISCONTINUED | OUTPATIENT
Start: 2020-01-01 | End: 2020-01-01

## 2020-01-01 RX ORDER — SODIUM CHLORIDE 9 MG/ML
INJECTION, SOLUTION INTRAVENOUS CONTINUOUS
Status: DISCONTINUED | OUTPATIENT
Start: 2020-01-01 | End: 2020-01-01

## 2020-01-01 RX ORDER — ATORVASTATIN CALCIUM 80 MG/1
80 TABLET, FILM COATED ORAL NIGHTLY
Status: DISCONTINUED | OUTPATIENT
Start: 2020-01-01 | End: 2020-01-01

## 2020-01-01 RX ORDER — ASPIRIN 325 MG
325 TABLET ORAL DAILY
Status: DISCONTINUED | OUTPATIENT
Start: 2020-01-01 | End: 2020-01-01

## 2020-01-01 RX ORDER — PHENYLEPHRINE HCL IN 0.9% NACL 50MG/250ML
PLASTIC BAG, INJECTION (ML) INTRAVENOUS
Status: COMPLETED
Start: 2020-01-01 | End: 2020-01-01

## 2020-01-01 RX ORDER — ENOXAPARIN SODIUM 100 MG/ML
0.5 INJECTION SUBCUTANEOUS DAILY
Status: DISCONTINUED | OUTPATIENT
Start: 2020-01-01 | End: 2020-01-01

## 2020-01-01 RX ORDER — ALBUMIN, HUMAN INJ 5% 5 %
SOLUTION INTRAVENOUS
Status: DISCONTINUED
Start: 2020-01-01 | End: 2020-01-01

## 2020-01-01 RX ORDER — VANCOMYCIN/0.9 % SOD CHLORIDE 1.75 G/5
15 PLASTIC BAG, INJECTION (ML) INTRAVENOUS
Status: DISCONTINUED | OUTPATIENT
Start: 2020-11-25 | End: 2020-01-01

## 2020-01-01 RX ORDER — ASPIRIN 300 MG
300 SUPPOSITORY, RECTAL RECTAL DAILY
Status: DISCONTINUED | OUTPATIENT
Start: 2020-01-01 | End: 2020-01-01

## 2020-01-01 RX ORDER — AMLODIPINE BESYLATE 5 MG/1
10 TABLET ORAL DAILY
Status: DISCONTINUED | OUTPATIENT
Start: 2020-01-01 | End: 2020-01-01

## 2020-01-01 RX ORDER — DEXTROSE MONOHYDRATE 25 G/50ML
50 INJECTION, SOLUTION INTRAVENOUS
Status: DISCONTINUED | OUTPATIENT
Start: 2020-01-01 | End: 2020-01-01

## 2020-01-01 RX ORDER — MONTELUKAST SODIUM 10 MG/1
TABLET ORAL
Qty: 90 TABLET | Refills: 0 | Status: SHIPPED | OUTPATIENT
Start: 2020-01-01 | End: 2020-01-01

## 2020-01-01 RX ORDER — MIDAZOLAM HYDROCHLORIDE 1 MG/ML
10 INJECTION INTRAMUSCULAR; INTRAVENOUS ONCE
Status: COMPLETED | OUTPATIENT
Start: 2020-01-01 | End: 2020-01-01

## 2020-01-01 RX ORDER — CLOPIDOGREL BISULFATE 75 MG/1
75 TABLET ORAL DAILY
Status: DISCONTINUED | OUTPATIENT
Start: 2020-01-01 | End: 2020-01-01

## 2020-01-01 RX ORDER — POTASSIUM CHLORIDE 20 MEQ/1
40 TABLET, EXTENDED RELEASE ORAL EVERY 4 HOURS
Status: COMPLETED | OUTPATIENT
Start: 2020-01-01 | End: 2020-01-01

## 2020-01-01 RX ORDER — ASPIRIN 81 MG/1
324 TABLET, CHEWABLE ORAL ONCE
Status: COMPLETED | OUTPATIENT
Start: 2020-01-01 | End: 2020-01-01

## 2020-01-01 RX ORDER — ETOMIDATE 2 MG/ML
INJECTION INTRAVENOUS AS NEEDED
Status: DISCONTINUED | OUTPATIENT
Start: 2020-01-01 | End: 2020-01-01

## 2020-01-01 RX ORDER — PHENYLEPHRINE HCL IN 0.9% NACL 50MG/250ML
PLASTIC BAG, INJECTION (ML) INTRAVENOUS CONTINUOUS
Status: DISCONTINUED | OUTPATIENT
Start: 2020-01-01 | End: 2020-01-01

## 2020-01-01 RX ORDER — VECURONIUM BROMIDE 1 MG/ML
0.1 INJECTION, POWDER, LYOPHILIZED, FOR SOLUTION INTRAVENOUS ONCE
Status: COMPLETED | OUTPATIENT
Start: 2020-01-01 | End: 2020-01-01

## 2020-01-01 RX ORDER — ALBUMIN, HUMAN INJ 5% 5 %
500 SOLUTION INTRAVENOUS ONCE
Status: COMPLETED | OUTPATIENT
Start: 2020-01-01 | End: 2020-01-01

## 2020-01-01 RX ORDER — ONDANSETRON 2 MG/ML
4 INJECTION INTRAMUSCULAR; INTRAVENOUS EVERY 4 HOURS PRN
Status: DISCONTINUED | OUTPATIENT
Start: 2020-01-01 | End: 2020-01-01

## 2020-01-01 RX ORDER — LABETALOL HYDROCHLORIDE 5 MG/ML
10 INJECTION, SOLUTION INTRAVENOUS EVERY 10 MIN PRN
Status: DISCONTINUED | OUTPATIENT
Start: 2020-01-01 | End: 2020-01-01

## 2020-01-01 RX ORDER — VECURONIUM BROMIDE 1 MG/ML
INJECTION, POWDER, LYOPHILIZED, FOR SOLUTION INTRAVENOUS AS NEEDED
Status: DISCONTINUED | OUTPATIENT
Start: 2020-01-01 | End: 2020-01-01

## 2020-01-01 RX ORDER — SODIUM CHLORIDE 9 MG/ML
INJECTION, SOLUTION INTRAVENOUS ONCE
Status: COMPLETED | OUTPATIENT
Start: 2020-01-01 | End: 2020-01-01

## 2020-01-01 RX ORDER — BLOOD SUGAR DIAGNOSTIC
STRIP MISCELLANEOUS
Qty: 500 STRIP | Refills: 0 | Status: SHIPPED | OUTPATIENT
Start: 2020-01-01 | End: 2020-01-01 | Stop reason: CLARIF

## 2020-01-01 RX ORDER — AMLODIPINE BESYLATE 10 MG/1
10 TABLET ORAL DAILY
Qty: 30 TABLET | Refills: 2 | Status: SHIPPED | OUTPATIENT
Start: 2020-01-01

## 2020-01-01 RX ORDER — LORAZEPAM 2 MG/ML
0.5 INJECTION INTRAMUSCULAR ONCE
Status: DISCONTINUED | OUTPATIENT
Start: 2020-01-01 | End: 2020-01-01

## 2020-01-01 RX ORDER — LACTULOSE 10 G/15ML
30 SOLUTION ORAL 3 TIMES DAILY
Status: DISCONTINUED | OUTPATIENT
Start: 2020-01-01 | End: 2020-01-01

## 2020-01-01 RX ORDER — MONTELUKAST SODIUM 10 MG/1
10 TABLET ORAL DAILY
Status: DISCONTINUED | OUTPATIENT
Start: 2020-01-01 | End: 2020-01-01

## 2020-01-01 RX ORDER — ONDANSETRON 2 MG/ML
4 INJECTION INTRAMUSCULAR; INTRAVENOUS EVERY 6 HOURS PRN
Status: DISCONTINUED | OUTPATIENT
Start: 2020-01-01 | End: 2020-01-01

## 2020-01-01 RX ORDER — POTASSIUM CHLORIDE 1.5 G/1.77G
40 POWDER, FOR SOLUTION ORAL EVERY 4 HOURS
Status: COMPLETED | OUTPATIENT
Start: 2020-01-01 | End: 2020-01-01

## 2020-01-01 RX ORDER — HYDROMORPHONE HYDROCHLORIDE 1 MG/ML
0.5 INJECTION, SOLUTION INTRAMUSCULAR; INTRAVENOUS; SUBCUTANEOUS EVERY 30 MIN PRN
Status: ACTIVE | OUTPATIENT
Start: 2020-01-01 | End: 2020-01-01

## 2020-01-01 RX ORDER — MAGNESIUM OXIDE 400 MG (241.3 MG MAGNESIUM) TABLET
800 TABLET DAILY
COMMUNITY
End: 2020-01-01 | Stop reason: CLARIF

## 2020-01-01 RX ORDER — LORAZEPAM 2 MG/ML
1 INJECTION INTRAMUSCULAR ONCE
Status: DISCONTINUED | OUTPATIENT
Start: 2020-01-01 | End: 2020-01-01

## 2020-01-01 RX ORDER — CEPHALEXIN 500 MG/1
500 CAPSULE ORAL 4 TIMES DAILY
Qty: 40 CAPSULE | Refills: 0 | Status: SHIPPED | OUTPATIENT
Start: 2020-01-01 | End: 2020-01-01

## 2020-01-01 RX ORDER — MONTELUKAST SODIUM 10 MG/1
TABLET ORAL
Qty: 90 TABLET | Refills: 0 | Status: SHIPPED | OUTPATIENT
Start: 2020-01-01 | End: 2020-01-01 | Stop reason: CLARIF

## 2020-01-01 RX ORDER — MORPHINE SULFATE 2 MG/ML
2 INJECTION, SOLUTION INTRAMUSCULAR; INTRAVENOUS ONCE
Status: DISCONTINUED | OUTPATIENT
Start: 2020-01-01 | End: 2020-01-01

## 2020-01-01 RX ORDER — CLOPIDOGREL BISULFATE 75 MG/1
75 TABLET ORAL DAILY
Qty: 30 TABLET | Refills: 1 | Status: SHIPPED | OUTPATIENT
Start: 2020-01-01

## 2020-01-01 RX ORDER — HEPARIN SODIUM 5000 [USP'U]/ML
7500 INJECTION, SOLUTION INTRAVENOUS; SUBCUTANEOUS EVERY 8 HOURS SCHEDULED
Status: DISCONTINUED | OUTPATIENT
Start: 2020-01-01 | End: 2020-01-01

## 2020-01-01 RX ORDER — CEPHALEXIN 500 MG/1
500 CAPSULE ORAL 4 TIMES DAILY
Qty: 28 CAPSULE | Refills: 0 | Status: SHIPPED | OUTPATIENT
Start: 2020-01-01 | End: 2020-01-01

## 2020-01-01 RX ORDER — MONTELUKAST SODIUM 10 MG/1
10 TABLET ORAL NIGHTLY PRN
COMMUNITY

## 2020-01-01 RX ORDER — ACETAMINOPHEN 325 MG/1
650 TABLET ORAL EVERY 6 HOURS PRN
Status: DISCONTINUED | OUTPATIENT
Start: 2020-01-01 | End: 2020-01-01

## 2020-01-01 RX ORDER — ATORVASTATIN CALCIUM 80 MG/1
80 TABLET, FILM COATED ORAL NIGHTLY
Qty: 30 TABLET | Refills: 2 | Status: SHIPPED | OUTPATIENT
Start: 2020-01-01

## 2020-01-01 RX ORDER — ETOMIDATE 2 MG/ML
10 INJECTION INTRAVENOUS ONCE
Status: COMPLETED | OUTPATIENT
Start: 2020-01-01 | End: 2020-01-01

## 2020-01-01 RX ORDER — LANCETS
EACH MISCELLANEOUS
Qty: 100 EACH | Refills: 1 | Status: SHIPPED | OUTPATIENT
Start: 2020-01-01 | End: 2020-01-01 | Stop reason: CLARIF

## 2020-01-01 RX ORDER — METOCLOPRAMIDE HYDROCHLORIDE 5 MG/ML
10 INJECTION INTRAMUSCULAR; INTRAVENOUS EVERY 8 HOURS PRN
Status: DISCONTINUED | OUTPATIENT
Start: 2020-01-01 | End: 2020-01-01

## 2020-01-01 RX ORDER — AMLODIPINE BESYLATE 5 MG/1
5 TABLET ORAL DAILY
Status: DISCONTINUED | OUTPATIENT
Start: 2020-01-01 | End: 2020-01-01

## 2020-01-01 RX ORDER — HYDRALAZINE HYDROCHLORIDE 20 MG/ML
10 INJECTION INTRAMUSCULAR; INTRAVENOUS EVERY 2 HOUR PRN
Status: DISCONTINUED | OUTPATIENT
Start: 2020-01-01 | End: 2020-01-01

## 2020-01-01 RX ORDER — SULFAMETHOXAZOLE AND TRIMETHOPRIM 800; 160 MG/1; MG/1
1 TABLET ORAL 2 TIMES DAILY
Qty: 20 TABLET | Refills: 0 | Status: SHIPPED | OUTPATIENT
Start: 2020-01-01 | End: 2020-01-01

## 2020-01-01 RX ORDER — AMOXICILLIN 875 MG/1
875 TABLET, COATED ORAL 2 TIMES DAILY
Qty: 10 TABLET | Refills: 0 | Status: SHIPPED | OUTPATIENT
Start: 2020-01-01 | End: 2020-01-01

## 2020-01-01 RX ORDER — LACTULOSE 10 G/15ML
10 SOLUTION ORAL 3 TIMES DAILY
Status: DISCONTINUED | OUTPATIENT
Start: 2020-01-01 | End: 2020-01-01

## 2020-01-01 RX ORDER — LABETALOL HYDROCHLORIDE 5 MG/ML
20 INJECTION, SOLUTION INTRAVENOUS ONCE
Status: DISCONTINUED | OUTPATIENT
Start: 2020-01-01 | End: 2020-01-01

## 2020-01-01 RX ORDER — LORAZEPAM 2 MG/ML
1 INJECTION INTRAMUSCULAR EVERY 4 HOURS PRN
Status: DISCONTINUED | OUTPATIENT
Start: 2020-01-01 | End: 2020-01-01

## 2020-01-01 RX ORDER — VANCOMYCIN 2 GRAM/500 ML IN 0.9 % SODIUM CHLORIDE INTRAVENOUS
25 ONCE
Status: COMPLETED | OUTPATIENT
Start: 2020-01-01 | End: 2020-01-01

## 2020-01-01 RX ORDER — LORAZEPAM 2 MG/ML
1 INJECTION INTRAMUSCULAR ONCE
Status: COMPLETED | OUTPATIENT
Start: 2020-01-01 | End: 2020-01-01

## 2020-01-01 RX ORDER — ASPIRIN 325 MG
325 TABLET ORAL DAILY
Qty: 30 TABLET | Refills: 0 | Status: SHIPPED | OUTPATIENT
Start: 2020-01-01 | End: 2020-01-01

## 2020-01-01 RX ORDER — METOPROLOL TARTRATE 5 MG/5ML
5 INJECTION INTRAVENOUS ONCE
Status: COMPLETED | OUTPATIENT
Start: 2020-01-01 | End: 2020-01-01

## 2020-01-01 RX ORDER — CEPHALEXIN 500 MG/1
CAPSULE ORAL
Qty: 40 CAPSULE | Refills: 0 | OUTPATIENT
Start: 2020-01-01

## 2020-01-01 RX ORDER — ACETAMINOPHEN 650 MG/1
650 SUPPOSITORY RECTAL EVERY 4 HOURS PRN
Status: DISCONTINUED | OUTPATIENT
Start: 2020-01-01 | End: 2020-01-01

## 2020-01-01 RX ORDER — ASPIRIN 81 MG/1
81 TABLET, CHEWABLE ORAL DAILY
Status: DISCONTINUED | OUTPATIENT
Start: 2020-01-01 | End: 2020-01-01

## 2020-01-01 RX ORDER — CHLORHEXIDINE GLUCONATE 0.12 MG/ML
15 RINSE ORAL 2 TIMES DAILY
Status: DISCONTINUED | OUTPATIENT
Start: 2020-01-01 | End: 2020-01-01

## 2020-01-01 RX ORDER — DEXTROSE MONOHYDRATE 25 G/50ML
50 INJECTION, SOLUTION INTRAVENOUS ONCE
Status: COMPLETED | OUTPATIENT
Start: 2020-01-01 | End: 2020-01-01

## 2020-01-01 RX ORDER — ACETAMINOPHEN 325 MG/1
650 TABLET ORAL EVERY 4 HOURS PRN
Status: DISCONTINUED | OUTPATIENT
Start: 2020-01-01 | End: 2020-01-01

## 2020-01-01 RX ORDER — SCOLOPAMINE TRANSDERMAL SYSTEM 1 MG/1
1 PATCH, EXTENDED RELEASE TRANSDERMAL
Status: DISCONTINUED | OUTPATIENT
Start: 2020-01-01 | End: 2020-01-01

## 2020-01-01 RX ADMIN — VECURONIUM BROMIDE 10 MG: 1 INJECTION, POWDER, LYOPHILIZED, FOR SOLUTION INTRAVENOUS at 17:57:00

## 2020-01-01 RX ADMIN — ETOMIDATE 10 MG: 2 INJECTION INTRAVENOUS at 17:57:00

## 2020-02-13 NOTE — PATIENT INSTRUCTIONS
Go to the ER for any fever or spreading of redness. Follow up with Dr. Abundio Hess in the next 2-3 days. Discharge Instructions for Cellulitis  You have been diagnosed with cellulitis.  This is an infection in the deepest layer of the skin and tissue bene · Redness that gets worse in or around the infected area, particularly if the area of redness expands to a wider area  · Shaking chills  · Swelling of the infected area  · Vomiting  Date Last Reviewed: 8/1/2016  © 4480-1904 [employer].  800 T

## 2020-02-13 NOTE — PROGRESS NOTES
CHIEF COMPLAINT:   Patient presents with: Other: L calf red x 12 hours       HPI:     Jailene Fischer is a 64year old male who presents with concerns of left leg cellulitis. Patient first noticed symptoms about 12 hours ago.   Reports erythema, increased • Phentermine HCl 15 MG Oral Cap Take 1 capsule (15 mg total) by mouth every morning.  (Patient not taking: Reported on 2/13/2020 ) 30 capsule 1      Past Medical History:   Diagnosis Date   • Anxiety    • Arrhythmia     heart murmur   • Calculus of kidney SKIN: erythema noted to left lower extremity about 2/3 the anterior portion of the calf extending posteriorly. Lateral aspect is not erythematous. There is no erythema to ankle or foot. Pt does have cracked calluses noted to heel.    LUNGS: clear to auscult · Take the prescribed antibiotic medicine you are given as directed until it is gone. Take it even if you feel better. It treats the infection and stops it from returning. Not taking all the medicine can make future infections hard to treat.   · Keep the in

## 2020-02-23 NOTE — PROGRESS NOTES
Chief Complaint:   Patient presents with: Follow - Up    HPI:   This is a 64year old male presenting for annual physical and  left leg swelling, some improvement with steroid and also has seen dr. Aruna Zepeda.     Patient has a history of high BMI of 60 needs infection)     pt stated that he was on antibiotics for 2 courses, he will follow up with md and inform Dr Williams Matta as to outcome   • Visual impairment     glasses   • Vitamin D deficiency    • Wears glasses    • Weight gain      Past Surgical History:   Pro diaphoresis, fatigue and fever. HENT: Negative for congestion, ear pain, facial swelling, postnasal drip, rhinorrhea, sinus pressure, sore throat and voice change.     Eyes: Negative for photophobia, pain, discharge, redness, itching and visual disturbanc clear and moist. No oropharyngeal exudate or pharynx erythema. Eyes: Pupils are equal, round, and reactive to light. Conjunctivae and EOM are normal. Right eye exhibits no discharge. Left eye exhibits no discharge. Neck: Normal range of motion.  Neck bowden have surgery with dr. Fauzia Haji around June 2020.

## 2020-02-24 NOTE — TELEPHONE ENCOUNTER
Pt needs a refill of the antibiotic for his cellulitis. Pt not sure the name of the medication. Pls send to [facility].

## 2020-02-24 NOTE — TELEPHONE ENCOUNTER
Called patient and spoke with him. Patient states that he would like a refill on his antibiotic. Patient states that his cellulitis is not completely healed. Patient states that it is about the same as when he was here on Friday.   Patient reports taking

## 2020-02-24 NOTE — TELEPHONE ENCOUNTER
Called patient and spoke with him. Advised him of information below. Patient states understanding. All questions answered for patient and patient expressed understanding.

## 2020-02-24 NOTE — TELEPHONE ENCOUNTER
----- Message from Cally Valladares MD sent at 2/24/2020  1:17 PM CST -----  Recheck diabetic labs in 3 months, liver enzymes are stable. Recheck CBC in 6 months.

## 2020-03-07 NOTE — PROGRESS NOTES
CHIEF COMPLAINT:   Patient presents with:  Redness: Left caft redness, swelling , feeling nausea, X today   Blood Sugar: 140 today but states usually having 118       HPI:     Symone Hoffman is a 64year old male who presents with concerns of left leg coral Obesity 12/13/2011   • Prediabetes    • Pseudocholinesterase deficiency     does not have, unable to click no   • Stress    • Unspecified sleep apnea     Bipap Premier   • UTI (urinary tract infection)     pt stated that he was on antibiotics for 2 courses and discuss recurrent cellulitis infections/elevated blood sugar.      Requested Prescriptions     Signed Prescriptions Disp Refills   • Sulfamethoxazole-TMP -160 MG Oral Tab per tablet 20 tablet 0     Sig: Take 1 tablet by mouth 2 (two) times daily f

## 2020-03-07 NOTE — PATIENT INSTRUCTIONS
Elevate leg   Continue wrapping the leg   Tylenol for pain as needed   If worsening redness/swelling/fever go to ED   Close PCP follow up

## 2020-10-19 NOTE — TELEPHONE ENCOUNTER
----- Message from Rafaela Ang MD sent at 3/16/2018  4:33 PM CDT -----  Recheck labs in 3 months. 7/8/2020

## 2020-11-07 PROBLEM — I63.9 CVA (CEREBRAL VASCULAR ACCIDENT) (HCC): Status: ACTIVE | Noted: 2020-01-01

## 2020-11-07 PROBLEM — E66.01 MORBID OBESITY WITH BODY MASS INDEX (BMI) OF 60.0 TO 69.9 IN ADULT (HCC): Status: ACTIVE | Noted: 2019-01-22

## 2020-11-07 PROBLEM — R77.8 ELEVATED TROPONIN: Status: ACTIVE | Noted: 2020-01-01

## 2020-11-07 PROBLEM — R73.9 HYPERGLYCEMIA: Status: ACTIVE | Noted: 2020-01-01

## 2020-11-07 PROBLEM — R79.89 ELEVATED TROPONIN: Status: ACTIVE | Noted: 2020-01-01

## 2020-11-07 PROBLEM — I16.1 HYPERTENSIVE EMERGENCY WITHOUT CONGESTIVE HEART FAILURE: Status: ACTIVE | Noted: 2020-01-01

## 2020-11-07 PROBLEM — I63.9 ACUTE ISCHEMIC STROKE (HCC): Status: ACTIVE | Noted: 2020-01-01

## 2020-11-07 PROCEDURE — 99255 IP/OBS CONSLTJ NEW/EST HI 80: CPT | Performed by: INTERNAL MEDICINE

## 2020-11-07 NOTE — ED PROVIDER NOTES
Patient Seen in: [facility] Emergency Department      History   Patient presents with:  Fatigue  Hyperglycemia    Stated Complaint: generalized weakness, elevated BP and glucose     HPI    This is a pleasant 79-year-old male who presents here to the oriented ×3  HEENT: Pupils are equal reactive to light. Extra ocular motions are intact. No scleral icterus or conjunctival pallor: Neck is supple without tenderness on palpation. Head is atraumatic normocephalic. Oral mucosa moist.  Tongue is midline. PLATELET.   Procedure                               Abnormality         Status                     ---------                               -----------         ------                     CBC W/ DIFFERENTIAL[[id number]]          Abnormal            Final resul of the michel.  No significant mass effect.  Critical exam result was phoned to Dr. Duran Delaney in the ER on 11/7/2020 at 1616 hours with read back.           Dictated by (CST): Don Trinidad MD on 11/07/2020 at [address] by (CST): Dicky Lightning, M consulted. The patient will be admitted to the hospital continued care. Chest x-ray showed subsegmental atelectasis.    A total of 35 minutes of critical care time (exclusive of billable procedures) was administered to manage the patient's critical imagin

## 2020-11-07 NOTE — ED INITIAL ASSESSMENT (HPI)
Last 24 hours patient has felt anxious or peculiar. Patient has been having high blood sugar (270) for the last 2 days.

## 2020-11-08 PROCEDURE — 99233 SBSQ HOSP IP/OBS HIGH 50: CPT | Performed by: INTERNAL MEDICINE

## 2020-11-08 NOTE — PLAN OF CARE
NURSING ADMISSION NOTE      Patient admitted via Cart  Oriented to room. Safety precautions initiated. Bed in low position. Call light in reach. Pt. Is a&o x4 on admission. Admission navigator completed.  Fall risk information reviewed with akilah

## 2020-11-08 NOTE — CONSULTS
[facility] Cardiology  Report of Consultation    Nima King Patient Status:  Inpatient    4/15/1960 MRN [id number]   [facility] 3NE-A Attending Arlyn Huynh, DO   Hosp Day # 0 PCP Vicente Katz MD     Reason for Consultation:  Stroke, mac Past Surgical History:   Procedure Laterality Date   • COLONOSCOPY N/A 11/19/2019    Performed by Scottie Rhoades MD at [facility] ENDOSCOPY   • COLONOSCOPY N/A 7/9/2019    Performed by Scottie Rhoades MD at [facility] ENDOSCOPY   • COLONOSCOPY N/A 4/1/2016    P HGB 15.6 11/07/2020    HCT 44.1 11/07/2020    .0 11/07/2020               Assessment/Plan:    1. CV -patient appears to have a stroke. Is a question whether this is related to hypertensive emergency.   Systolic blood pressures were markedly elevated

## 2020-11-08 NOTE — CONSULTS
[address]  Neurology Initial Consultation    Margo Galindo Patient Status:  Inpatient    4/15/1960 MRN [id number]   [facility] 3NE-A Attending Martínez Alcaraz, [address] [facility] Day # 1 PCP Julian Newton MD     REASON FOR EVALUATION:  Jackie Tian associated headaches, neck pain, nausea, or vomiting.   The patient denies cranial nerve symptoms such as blurred vision, diplopia, photophobia, ptosis, facial weakness/paresthesias, hearing loss, vertigo, tinnitus, hoarseness, dysphagia, or alternations in HISTORY      excision of R. post. shoulder cyst        FAMILY HISTORY:  family history includes Diabetes in his mother; ruptured AAA in his father. Mother had a stroke in her 62s    SOCIAL HISTORY:   reports that he has never smoked.  He has never used full range of motion; no carotid bruits      Neuro:  NIHSS 3  Mental status:  Orientation: Alert and oriented to person, place, time  Naming, repetition and comprehension intact  Memory: normal  Attention/concentration: normal    CN: PERRL, EOMI without ny Latest Ref Rng & Units 11/8/2020   Cholesterol, Total      <200 mg/dL 165   HDL Cholesterol      40 - 59 mg/dL 49   Triglycerides      30 - 149 mg/dL 64   LDL Cholesterol Calc      <100 mg/dL 103 (H)   VLDL      0 - 30 mg/dL 13   NON HDL CHOL      <130 m A1C 5.7,    -  Goal -160, avoid hypotensive events  - PT/OT/speech therapy   - smoking cessation education   - SCDs and DVT ppx      Given young age, check hypercoaguable profile as well    Will follow    Hyacinth Rasheed MD, Neurology  [facility] [place]

## 2020-11-08 NOTE — PROGRESS NOTES
[facility]PITAL[facility]  Progress Note     Bebe Larson Patient Status:  Inpatient    4/15/1960 MRN [id number]   [facility] 3NE-A Attending Meagan Sherwood, [address] [facility] Day # 1 PCP Adina Cordon MD     Chief Complaint: Speech difficulty    S: Radames Nicole 11/07/20  2254 11/08/20  0841   TROP 0.173* 0.152* 0.157*   CK  --   --  204            Imaging: Imaging data reviewed in Epic.     Medications:   • Potassium Chloride ER  40 mEq Oral Q4H   • Montelukast Sodium  10 mg Oral Daily   • aspirin  300 mg Rectal D

## 2020-11-08 NOTE — RESPIRATORY THERAPY NOTE
VENKATA : EQUIPMENT USE: DAILY SUMMARY                                            SET MODE: BILEVEL                                          USAGE IN HOURS:7:14                                          90% EXP. PRESSURE

## 2020-11-08 NOTE — PLAN OF CARE
Pt. A&O x4, on RA during day, bipap at night (baseline). Tele shows NSR. VSS. Neurochecks Q2, left sided weakness and slight facial droop. Pt. Passed bedside swallow evaluation. QID accuchecks. Up x2 assist. Lovenox for prophylaxis.  C/o mild back pain, Tyl impaired and aphasic patients to use assistive/communication devices  Outcome: Progressing     Problem: PAIN - ADULT  Goal: Verbalizes/displays adequate comfort level or patient's stated pain goal  Description: INTERVENTIONS:  - Encourage pt to monitor lamin

## 2020-11-08 NOTE — PHYSICAL THERAPY NOTE
Orders received, chart reviewed. Per RN, patient to have echo in 5 mins. Will see patient as soon as schedule permits and patient is appropriate.

## 2020-11-08 NOTE — PROGRESS NOTES
[facility]  Cardiology Progress Note    Natasha Sprague Patient Status:  Inpatient    4/15/1960 MRN [id number]   [facility] 3NE-A Attending Yash Castillo, [address] [address] Day # 1 PCP Chester Hill MD     Subjective:  Denies chest pain or SOB AIC 5.7%    Plan:   1. Await results of echo  2. Continue noravsc for BP  3. ASA, statin per neuro. MARVIN Montes  11/8/2020  10:51 AM    Patient seen and examined    No new complaints. Patient has had a right pontine infarction.   Etiology uncl

## 2020-11-08 NOTE — CONSULTS
[id number] [facility] Neurology Preliminary Note    Ronette Ahumada Patient Status:  Inpatient    4/15/1960 MRN [id number]   [facility] 3NE-A Attending Rachel Vick, [address] [facility] [address] [facility] [address] Day # 1 PCP Mandy Rust MD     REASON FOR EVALUATION:  Stroke The patient denies associated headaches, neck pain, nausea, or vomiting.   The patient denies cranial nerve symptoms such as blurred vision, diplopia, photophobia, ptosis, facial weakness/paresthesias, hearing loss, vertigo, tinnitus, hoarseness, dysphagia, • OTHER SURGICAL HISTORY      excision of R. post. shoulder cyst        FAMILY HISTORY:  family history includes Diabetes in his mother; ruptured AAA in his father. Mother had a stroke in her 62s    SOCIAL HISTORY:   reports that he has never smoked.  H •  amLODIPine Besylate (NORVASC) tab 5 mg, 5 mg, Oral, Daily    PHYSICAL EXAMINATION:  VITAL SIGNS: /66 (BP Location: Left arm)   Pulse 57   Temp 98.2 °F (36.8 °C) (Oral)   Resp 18   Ht 69\"   Wt (!) 430 lb (195 kg)   SpO2 91%   BMI 63.50 kg/m²   GEN <5.7 % 5.7 (H)     Component      Latest Ref Rng & Units 11/8/2020   Cholesterol, Total      <200 mg/dL 165   HDL Cholesterol      40 - 59 mg/dL 49   Triglycerides      30 - 149 mg/dL 64   LDL Cholesterol Calc      <100 mg/dL 103 (H)   VLDL      0 - 30

## 2020-11-08 NOTE — PLAN OF CARE
Patient A&O x 4. Room air, . Tele NSR. -180 per orders. SLP eval today recommends chopped diet d/t dysarthria. Daily NIH, Stroke protocol, currently Q4H neuros. Left sided Weakness 4/5, with drift and weaker shoulder shrug.  Facial asymmetry with

## 2020-11-08 NOTE — PROGRESS NOTES
ADULT SWALLOWING EVALUATION    ASSESSMENT    ASSESSMENT/OVERALL IMPRESSION:    The patient is a 65-year old male. Admitted to [facility] due to complaints of feeling \"peculiar\", elevated anxiety, upper/lower extremity weakness and slurred speech.  P Recommendations/Plan: Home    HISTORY   MEDICAL HISTORY  Reason for Referral: Stroke protocol    Problem List  Principal Problem:    Acute ischemic stroke [facility])  Active Problems:     Morbid obesity with body mass index (BMI) of 60.0 to 69.9 in adult [facility]) effusion. SUBJECTIVE   SLP order received. Medical chart reviewed. Clinical swallow evaluation completed on this date. Patient received in room, seated upright in recliner chair near bedside. On RA, No acute distress. Agreeable to the evaluation.      OBJE therapy  Number of Visits to Meet Established Goals: 3  Follow Up Needed: Yes  SLP Follow-up Date: 11/09/20    Thank you for your referral.   If you have any questions, please contact Christine Pinon

## 2020-11-09 PROCEDURE — 99233 SBSQ HOSP IP/OBS HIGH 50: CPT | Performed by: INTERNAL MEDICINE

## 2020-11-09 NOTE — SLP NOTE
SPEECH/LANGUAGE/COGNITIVE EVALUATION - INPATIENT    Admission Date: 11/7/2020  Evaluation Date: 11/09/20    Reason for Referral: Stroke protocol    ASSESSMENT & PLAN   ASSESSMENT & IMPRESSION  Assessment(s) Administered:    [facility] speech, language and cognitive evaluation with additional goals to follow Goal met     Goal #4 Pt will utilize compensatory strategies to improve speech intelligibility to 90-95% at the sentence level given education and training with slp across 1 session. CCC-SLP  Speech and Language Pathologist  [phone]  [email or web address]

## 2020-11-09 NOTE — PROGRESS NOTES
[facility]  Cardiology Progress Note    Jailenebrayden Fischer Patient Status:  Inpatient    4/15/1960 MRN [id number]   [facility] 3NE-A Attending Jorden Alvarado, [address] [facility] Day # 2 PCP Debra Ovalle MD     Subjective:  Denies chest pain, SOB or deficits. Neck: No JVD  Cardiac: Regular rate and rhythm, S1, S2 normal   Lungs: Clear   Abdomen: Soft, obese, NT, + BS   Extremities: No edema. Peripheral pulses are 2+. Skin: Warm and dry.      Medications:  • Montelukast Sodium  10 mg Oral Daily   • a
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
## 2020-11-09 NOTE — PLAN OF CARE
Problem: Impaired Activities of Daily Living  Goal: Achieve highest/safest level of independence in self care  Description: Interventions:  - Assess ability and encourage patient to participate in ADLs to maximize function  - Promote sitting position [facility]

## 2020-11-09 NOTE — PLAN OF CARE
Problem: Impaired Swallowing  Goal: Minimize aspiration risk  Description: Interventions:  - Patient should be alert and upright for all feedings (90 degrees preferred)  - Offer food and liquids at a slow rate  - No straws  - Encourage small bites of hanh

## 2020-11-09 NOTE — RESPIRATORY THERAPY NOTE
VENKATA - Equipment Use Daily Summary:                  . Set Mode: BI-FLEX                . Usage in hours: 12:30                . 90% Pressure (EPAP) level: 10                . 90% Insp. Pressure (IPAP): 20                . AHI: 3.0                .  Supplemen

## 2020-11-09 NOTE — PROGRESS NOTES
[id number] [facility] Neurology Progress Note    Jesus Hull Patient Status:  Inpatient    4/15/1960 MRN [id number]   [facility] 3NE-A Attending Stefan Marroquin, [address] [facility] Day # 2 PCP Filomena Turk MD       Subjective:  Jesus Hull HEENT:  Normocephalic, atraumatic  ABD: Soft, non tender  SKIN: Warm, dry, no rashes    NEUROLOGICAL:   Mental status: Oriented to person, place, and time   Speech: Fluent, no dysarthria  Memory and comprehension: Intact   Cranial Nerves: VFF, PERRL 3mm br • amLODIPine Besylate  5 mg Oral Daily       PRN meds:  acetaminophen **OR** acetaminophen, Labetalol HCl, hydrALAzine HCl, ondansetron HCl **OR** Metoclopramide HCl    Infusions:  /51 (BP Location: Right arm)   Pulse 75   Temp 98 °F (36.7 °C) (Oral) ( x ) Images independently reviewed -2  ( x ) Case/studies discussed with other caregivers - 2  (   ) Fam meetings - patient not present --non F2F  Non Face to Face CPT code 63758/46549 applies as documented above    High risk    (   ) Drug monitoring    (

## 2020-11-09 NOTE — CM/SW NOTE
MSW, [facility]-ER and RN discussed patient's post d/c needs in care rounds.      Admitted for: + Stroke    Barrier to DC:  PT rec  Morbid Obesity  Weight > 450    Expected DC date: Unknown    Possible DC Plan:  Pending PT rec

## 2020-11-09 NOTE — PLAN OF CARE
Received patient awake and oriented with spouse at bedside. Patient more comfortable on bariatric bed. Still with L sided facial droop and weak L , L arm and L leg with drift. On Bipap overnight, breath sounds diminished. On tele, SR. Neuros done q 4h.

## 2020-11-09 NOTE — OCCUPATIONAL THERAPY NOTE
OCCUPATIONAL THERAPY EVALUATION - INPATIENT     Room Number: 6116/1225-E  Evaluation Date: 11/9/2020  Type of Evaluation: Initial  Presenting Problem: Ischemic CVA 11/7    Physician Order: IP Consult to Occupational Therapy  Reason for Therapy: ADL/IADL Dy (cerebral vascular accident) [facility])      Past Medical History  Past Medical History:   Diagnosis Date   • Anxiety    • Arrhythmia     heart murmur   • Calculus of kidney    • Depression    • Disorder of liver     was told he has a fatty liver   • Fatigue Pt is retired. Pt's wife is supportive. SUBJECTIVE   Pt is motivated to participate in therapy. Patient self-stated goal is to get better.      OBJECTIVE  Precautions: Limb alert - left  Fall Risk: High fall risk    WEIGHT BEARING RESTRICTION  Weight meals?: A Little    AM-PAC Score:  Score: 15  Approx Degree of Impairment: 56.46%  Standardized Score (AM-PAC Scale): 34.69  CMS Modifier (G-Code): CK    FUNCTIONAL TRANSFER ASSESSMENT  Supine to Sit : Not tested  Sit to Stand: Dependent assistance(Max A x functional transfers, instrumental activities of daily living, rest and sleep, work, leisure and social participation. Recommend acute rehab as pt is highly motivated to return to his PLOF.  As baseline, pt is independent with all functional mobility an Meet Established Goals: 5    ADL Goals   Patient will perform eating: with supervision  Patient will perform grooming: with supervision  Patient will perform upper body dressing:  with supervision  Patient will perform lower body dressing:  with supervisio

## 2020-11-09 NOTE — PROGRESS NOTES
[facility]HA HOSPITALIST  Progress Note     Margo Galindo Patient Status:  Inpatient    4/15/1960 MRN [id number]   [facility] 3NE-A Attending Martínez Alcaraz, [address] [facility] Day # 2 PCP Julian Newton MD     Chief Complaint: Speech difficulty    S: Doy Basye (L)).    No results for input(s): PTP, INR in the last 168 hours. Recent Labs   Lab 11/07/20  1851 11/07/20  2254 11/08/20  0841   TROP 0.173* 0.152* 0.157*   CK  --   --  204            Imaging: Imaging data reviewed in Epic.     Medications:   • Woody

## 2020-11-10 PROCEDURE — 99232 SBSQ HOSP IP/OBS MODERATE 35: CPT | Performed by: INTERNAL MEDICINE

## 2020-11-10 NOTE — PLAN OF CARE
A/Ox4. Calm and cooperative. On RA. Tele-NSR  (+) stroke, NIH daily, Neuros Qshift. L arm weakness worse, plan for CT head this afternoon. NIH this AM- 6. L leg weakness with drift noted. Slight L facial droop.   PT/OT rec acute rehab, plan for PMR consul appropriate  Outcome: Progressing     Problem: CARDIOVASCULAR - ADULT  Goal: Absence of cardiac arrhythmias or at baseline  Description: INTERVENTIONS:  - Continuous cardiac monitoring, monitor vital signs, obtain 12 lead EKG if indicated  - Evaluate effec
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
## 2020-11-10 NOTE — CONSULTS
.2555 Lawrence Reji Janelle Patient Status:  Inpatient    4/15/1960 MRN [id number]   [facility] 3NE-A Attending Adrienne Correa, [address] [facility] Day # 3 PCP Caio Martinez MD     Patient Identification  Emil Murdock is a 64year old male. Wall      thickness was normal. The estimated ejection fraction was 65-70%. No      diagnostic evidence for regional wall motion abnormalities. Left      ventricular diastolic function parameters were normal for the patient's      age.    2. Aortic valve: T for 2 courses, he will follow up with md and inform Dr Case Ferreira as to outcome   • Visual impairment     glasses   • Vitamin D deficiency    • Wears glasses    • Weight gain        Past Surgical History:   Procedure Laterality Date   • COLONOSCOPY N/A 11/19/2 enoxaparin sodium (LOVENOX) 100 MG/ML injection 100 mg, 0.5 mg/kg, Subcutaneous, Daily    •  atorvastatin (LIPITOR) tab 80 mg, 80 mg, Oral, Nightly    •  [] HYDROmorphone HCl (DILAUDID) 1 MG/ML injection 0.5 mg, 0.5 mg, Intravenous, Q30 Min PRN    • pedal pulses good. No cyanosis in all extremities. Abdomen:   No tenderness guarding or rigidity. Liver and spleen are not enlarged. Bowel sounds present. Musculoskeletal:     Right Upper Extremity:  Strength is 5. ROM WNL.    Left Upp for the consult. Nanci Weller MD  [facility].

## 2020-11-10 NOTE — PLAN OF CARE
Problem: Impaired Activities of Daily Living  Goal: Achieve highest/safest level of independence in self care  Description: Interventions:  - Assess ability and encourage patient to participate in ADLs to maximize function  - Promote sitting position [facility]

## 2020-11-10 NOTE — PROGRESS NOTES
[id number] Carlyn Guerrero Neurology Progress Note    Karthikeyan Maier Patient Status:  Inpatient    4/15/1960 MRN [id number]   [facility] 3NE-A Attending Luisa Bragg, [address] [facility] Day # 3 PCP Ngozi Berger MD       Subjective:  Karthikeyan Maier Speech: Fluent, no dysarthria  Memory and comprehension: Intact   Cranial Nerves: VFF, PERRL 3mm brisk, EOMI, no nystagmus, facial sensation intact, face symmetric, shoulder shrug equal, remainder CN intact  Motor: GERMAN appears weaker today, has difficulty • amLODIPine Besylate  10 mg Oral Daily   • ampicillin  500 mg Intravenous Q6H   • Montelukast Sodium  10 mg Oral Daily   • aspirin  300 mg Rectal Daily    Or   • aspirin  325 mg Oral Daily   • enoxaparin  0.5 mg/kg Subcutaneous Daily   • atorvastatin  80 ( x ) Case/studies discussed with other caregivers - 2  (   ) Fam meetings - patient not present --non F2F  Non Face to Face CPT code 31942/12767 applies as documented above    High risk    (   ) Drug monitoring    (   ) PCA    (   ) Organ failure    (   )

## 2020-11-10 NOTE — PLAN OF CARE
Problem: Impaired Activities of Daily Living  Goal: Achieve highest/safest level of independence in self care  Description: Interventions:  - Assess ability and encourage patient to participate in ADLs to maximize function  - Promote sitting position [facility]

## 2020-11-10 NOTE — PLAN OF CARE
A/O x4. Neuros Q4. LUE weakness, drift and ataxia. NIH daily. Consult to Acute rehab placed. VSS. Up to chair for meals. 2 person assist.  Accucheck QID. Cardiology rec 30 day event monitor on discharge. Call light placed within reach.   Spouse at b

## 2020-11-10 NOTE — PHYSICAL THERAPY NOTE
PHYSICAL THERAPY TREATMENT NOTE - INPATIENT    Room Number: 9919/9484-G     Session: 1/5   Number of Visits to Meet Established Goals: 5    Presenting Problem: acute ischemic stroke     History related to current admission: Pt admitted on 11/7/20 secondar Hypogonadism male    • IBS (irritable bowel syndrome)     unsure   • Leg swelling    • Mitral valve prolapse     no symptoms or treatment, pcp monitors   • Obesity 12/13/2011   • Prediabetes    • Pseudocholinesterase deficiency     does not have, unable to Turning over in bed (including adjusting bedclothes, sheets and blankets)?: A Lot   -   Sitting down on and standing up from a chair with arms (e.g., wheelchair, bedside commode, etc.): A Lot   -   Moving from lying on back to sitting on the side of the be and wheeled back to the room. Remained c OT in the room to complete oral care and ADLs. RN aware.      *PPE worn was surgical mask, gloves and goggles    THERAPEUTIC EXERCISES  Lower Extremity Alternating marching  Ankle pumps  Knee extension     Upper Extr 11/9/2020-updated to 11/10/20

## 2020-11-10 NOTE — PROGRESS NOTES
ESHA HOSPITALIST  Progress Note     Emil Job Patient Status:  Inpatient    4/15/1960 MRN [id number]   [facility] 3NE-A Attending Adrienne Correa, [address] [facility] Day # 3 PCP Caio Martinez MD     Chief Complaint: Speech difficulty    S: Barbara Fierro Clearance: 161 mL/min (A) (based on SCr of 0.5 mg/dL (L)). No results for input(s): PTP, INR in the last 168 hours.     Recent Labs   Lab 11/07/20  1851 11/07/20  2254 11/08/20  0841   TROP 0.173* 0.152* 0.157*   CK  --   --  204            Imaging: Imag

## 2020-11-10 NOTE — SLP NOTE
SPEECH DAILY NOTE - INPATIENT    ASSESSMENT & PLAN   ASSESSMENT  Pt seen for dysphagia tx to assess tolerance with recommended diet, ensure proper utilization of aspiration precautions and provide pt/family education.   Pt found lying in bed alert and parti aspiration precautions and swallow strategies minimal cues as needed over 2 session(s).     progress   Goal #3 Participate in comprehensive speech, language and cognitive evaluation with additional goals to follow Goal met     Goal #4 Pt will utilize compe

## 2020-11-10 NOTE — PROGRESS NOTES
[facility]  Cardiology Progress Note    Bebe Neo Patient Status:  Inpatient    4/15/1960 MRN [id number]   [facility] 3NE-A Attending Meagan Sherwood, [address] [facility] Day # 3 PCP Adina Cordon MD     Subjective:  No new events overnight.

## 2020-11-10 NOTE — PLAN OF CARE
Assumed patient care @7746. Patient a&ox4. Stroke protocol- NIH daily, neurochecks qshift. Slight L facial droop, L side drift/ataxia/weakness. On RA, . Matt @ [facility]. NSR/SB on tele. Lovenox subq. Electrolyte protocol. Q6 accucheck.   No c/o pain o MD/LIP if interventions unsuccessful or patient reports new pain  - Anticipate increased pain with activity and pre-medicate as appropriate  Outcome: Progressing     Problem: CARDIOVASCULAR - ADULT  Goal: Absence of cardiac arrhythmias or at baseline  Desc self-care    Outcome: Progressing

## 2020-11-11 PROCEDURE — 99232 SBSQ HOSP IP/OBS MODERATE 35: CPT | Performed by: INTERNAL MEDICINE

## 2020-11-11 NOTE — CM/SW NOTE
SW received order to address PHQ 9  Pt admits to sw that he is feeling depressed. He is retired from the [employer] and states he is used to being the strong yossi. Supported pt and went over depression/anxiety info in his stroke binder.   H

## 2020-11-11 NOTE — PROGRESS NOTES
[facility] Cardiology Progress Note    Subjective: arm and leg weakness feels the same. Was up with assistance.       Objective:  /66 (BP Location: Left arm)   Pulse 79   Temp 98.1 °F (36.7 °C) (Oral)   Resp 18   Ht 175.3 cm (5' 9\")   Wt (!) 430 lb (19 pontine infarction    CVA:  -No clear cardiac etiolgy  -Plan for 30 day event monitor  -Continue ASA and statin    Cards will sign off, please call with any questions    Eden Rosado MD  11/11/2020  [facility]  Interventional Cardiology

## 2020-11-11 NOTE — RESPIRATORY THERAPY NOTE
VENKATA - Equipment Use Daily Summary:                  . Set Mode: BI-FLEX                . Usage in hours: 11:06                . 90% Pressure (EPAP) level: 10                . 90% Insp. Pressure (IPAP): 20                . AHI: 1.9                .  Supplemen

## 2020-11-11 NOTE — SLP NOTE
SPEECH DAILY NOTE - INPATIENT    ASSESSMENT & PLAN   ASSESSMENT  Pt seen for dysphagia treatment and speech therapy as follow-up to initial evaluations completed. RN reports patient is tolerating PO intake without overt clinical s/s of aspiration.   Chance approx 95% accuracy following review of strategies. Diet Recommendations - Solids: Mechanical soft chopped  Diet Recommendations - Liquid: Thin    Compensatory Strategies Recommended: No straws; Slow rate;Small bites and sips; Extra sauce/gravy  Aspirat was unmasked throughout full session.     If you have any questions, please contact Tulio Chauhan

## 2020-11-11 NOTE — CM/SW NOTE
11/11/20 1000   CM/SW Referral Data   Referral Source Physician   Reason for Referral Discharge planning   Informant Patient   Patient Info   Patient's Mental Status Alert;Oriented       Sw met with pt to introduce him to role of sw.   Pt is 65 yo male,

## 2020-11-11 NOTE — PLAN OF CARE
Assumed patient care @7196. Patient a&ox4. Stroke protocol- NIH daily, neurochecks qshift. Slight L facial droop, L side drift/ataxia/weakness. On RA, . Matt @ [facility]. NSR/SB on tele. Lovenox subq. Electrolyte protocol. QID accucheck.   No c/o pain effects  - Notify MD/LIP if interventions unsuccessful or patient reports new pain  - Anticipate increased pain with activity and pre-medicate as appropriate  Outcome: Progressing     Problem: CARDIOVASCULAR - ADULT  Goal: Absence of cardiac arrhythmias or

## 2020-11-11 NOTE — PROGRESS NOTES
ESHA HOSPITALIST  Progress Note     Jadyn Hurtado Patient Status:  Inpatient    4/15/1960 MRN [id number]   [facility] 3NE-A Attending Pily Moss, [address] [facility] Day # 4 PCP Katherine Colin MD     Chief Complaint: Speech difficulty    S: [employer] mL/min (A) (based on SCr of 0.5 mg/dL (L)). No results for input(s): PTP, INR in the last 168 hours.     Recent Labs   Lab 11/07/20  1851 11/07/20  2254 11/08/20  0841   TROP 0.173* 0.152* 0.157*   CK  --   --  204            Imaging: Imaging data review

## 2020-11-11 NOTE — PROGRESS NOTES
22544 Carlyn Geurrero Neurology Progress Note    Symone Hoffman Patient Status:  Inpatient    4/15/1960 MRN [id number]   [facility] 3NE-A Attending Tedd Lesch, MD   [facility] Day # 4 PCP Abraham Romero MD       Subjective:  Symone Hoffman REVIEW OF SYSTEMS:  A 10-point system was reviewed. Pertinent positives and negatives are noted in HPI.       PHYSICAL EXAMINATION:  VITAL SIGNS: /64 (BP Location: Left arm)   Pulse 77   Temp 97.9 °F (36.6 °C) (Oral)   Resp 18   Ht 69\"   Wt (!) 430 Acute R pontine infarct, worsening LUE weakness with stable CTH  Obesity      Plan:  Ischemic order set  ASA 325mg daily (not on home antiplt)  Atorvastatin 80mg qhs  Goal -160, avoid hypotensive events  PT/OT/ST recommends AR, accepted at [facility]  DVT p POC Glucose 133 (H) 70 - 99 mg/dL   POCT GLUCOSE    Collection Time: 11/10/20  4:03 PM   Result Value Ref Range    POC Glucose 131 (H) 70 - 99 mg/dL   POCT GLUCOSE    Collection Time: 11/10/20  9:16 PM   Result Value Ref Range    POC Glucose 118 (H) 70 - (   ) PCA    (   ) Organ failure    (   ) De-escalation of treatment - DNR    (   ) Acute neurologic changes    (   ) Others: New Rx    (   ) ICU >35 minutes total time   Available within moments call in hospital  PROCEDURE DONE    (   ) see notes

## 2020-11-11 NOTE — PLAN OF CARE
Resumed care at 0700  Pt A&Ox4 calm and cooperative  Vs wnl, RA, NSR  Neuro check Qshift, left arm weakness, ataxia noted, left leg weakness noted  NIHSS daily  CPAP at noc  SBP parameters 120-160  Prn labatelol and hydralazine, none needed during day shif and pre-medicate as appropriate  Outcome: Progressing     Problem: CARDIOVASCULAR - ADULT  Goal: Absence of cardiac arrhythmias or at baseline  Description: INTERVENTIONS:  - Continuous cardiac monitoring, monitor vital signs, obtain 12 lead EKG if indicat

## 2020-11-12 NOTE — PROGRESS NOTES
ESHA HOSPITALIST  Progress Note     Del Gram Patient Status:  Inpatient    4/15/1960 MRN [id number]   [facility] 3NE-A Attending Lazarus Handy, [address] [facility] Day # 5 PCP Gen Garza MD     Chief Complaint: Speech difficulty    S: [employer] 0.5 mg/dL (L)). Recent Labs   Lab 11/08/20  1632   PTP 15.6*       Recent Labs   Lab 11/07/20  1851 11/07/20  2254 11/08/20  0841   TROP 0.173* 0.152* 0.157*   CK  --   --  204            Imaging: Imaging data reviewed in Epic.     Medications:   • amLOD

## 2020-11-12 NOTE — PHYSICAL THERAPY NOTE
PHYSICAL THERAPY TREATMENT NOTE - INPATIENT    Room Number: 9671/6493-P     Session: 2/5   Number of Visits to Meet Established Goals: 5    Presenting Problem: acute ischemic stroke     History related to current admission: Pt admitted on 11/7/20 secondar Hypogonadism male    • IBS (irritable bowel syndrome)     unsure   • Leg swelling    • Mitral valve prolapse     no symptoms or treatment, pcp monitors   • Obesity 12/13/2011   • Prediabetes    • Pseudocholinesterase deficiency     does not have, unable to does the patient currently have. ..  -   Turning over in bed (including adjusting bedclothes, sheets and blankets)?: A Little   -   Sitting down on and standing up from a chair with arms (e.g., wheelchair, bedside commode, etc.): A Lot   -   Moving from [facility] and decreased strength LUE , decreased endurance and impaired balance below PLOF s/p acute ischemic stroke  . Pt will continue to benefit from ongoing IP PT to maximize functional independence.   The AM-PAC '6-Clicks' Inpatient Basic Mobility Short Form wa

## 2020-11-12 NOTE — RESPIRATORY THERAPY NOTE
VENKATA : EQUIPMENT USE: DAILY SUMMARY                                            SET MODE: BILEVEL                                          USAGE IN HOURS:10:30                                          90% EXP. PRESSUR

## 2020-11-12 NOTE — PLAN OF CARE
Problem: Impaired Activities of Daily Living  Goal: Achieve highest/safest level of independence in self care  Description: Interventions:  - Assess ability and encourage patient to participate in ADLs to maximize function  - Promote sitting position [facility]

## 2020-11-12 NOTE — PLAN OF CARE
Patient is aox4  On room air  nsr on tele,   Urinal at bedside, briefed  Last bm 11/11  cpap @night  lovenox sub q  Refused scd  Denies pain and nausea  Carb control 1800kcal diet  Stroke protocol  Neuro q shift  NIH daily  accucheck Q6  Up with 2 and p appropriate  Outcome: Progressing     Problem: CARDIOVASCULAR - ADULT  Goal: Absence of cardiac arrhythmias or at baseline  Description: INTERVENTIONS:  - Continuous cardiac monitoring, monitor vital signs, obtain 12 lead EKG if indicated  - Evaluate effec
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
## 2020-11-12 NOTE — PLAN OF CARE
Patient alert and oriented x4. On Ra, . NSR on tele. [facility] daily. Neuro checks q shift. L sided weakness. BP within parameters. Denies pain. Tolerating diet. Possible dc to [facility]. Awaiting insurance auth. Resting comfortably in bed.   Will

## 2020-11-12 NOTE — CM/SW NOTE
1pm  MSW sent message to emmanuel to check on insurance auth-MSW told [place] is still pending. MSW called pt's room and cell to update him-no answer. MSW called and left message on spouse's cell phone that insurance [place] [employer] is still pending.     Bedside Rn updated

## 2020-11-12 NOTE — CM/SW NOTE
Ins auth just received from [employer] at [address] for Bea Finnegan # [id number]    tony placed call to emmanuel banuelos  to see if they can accept pt today. Awaiting call back.      ADDENDUM:  tony received call from emmanuel banuelos  stating they have to order a

## 2020-11-12 NOTE — PROGRESS NOTES
[address]  11/12/2020   3:01 PM    Patient seen and examined.     Problems:  Right pontine stroke    Developments the past 24 hours:  No new developments overnight  Left arm proximal weakness a bit stronger but still very mi

## 2020-11-13 NOTE — PROGRESS NOTES
11/13/20 1010 11/13/20 1014 11/13/20 1016   Vitals   /64 148/69 151/77     Orthostatic BP, this am patients reports feeling ok transferring to the chair with two assist and a walker.

## 2020-11-13 NOTE — RESPIRATORY THERAPY NOTE
VENKATA - Equipment Use Daily Summary:                  . Set Mode: BI-FLEX                . Usage in hours: 10:48                . 90% Pressure (EPAP) level: 10                . 90% Insp. Pressure (IPAP): 20                . AHI: 1.9                .  Supplemen

## 2020-11-13 NOTE — PROGRESS NOTES
ESHA HOSPITALIST  Progress Note     Nima King Patient Status:  Inpatient    4/15/1960 MRN [id number]   [facility] 3NE-A Attending Arlyn Huynh, [address] [facility] Day # 6 PCP Vicente Katz MD     Chief Complaint: Speech difficulty    S: Mathew Carty (based on SCr of 0.5 mg/dL (L)). Recent Labs   Lab 11/08/20  1632   PTP 15.6*       Recent Labs   Lab 11/07/20  1851 11/07/20  2254 11/08/20  0841   TROP 0.173* 0.152* 0.157*   CK  --   --  204            Imaging: Imaging data reviewed in Epic.     Medic

## 2020-11-13 NOTE — SLP NOTE
SPEECH DAILY NOTE - INPATIENT    ASSESSMENT & PLAN   ASSESSMENT  Pt seen for dysphagia treatment and speech therapy to ensure safety with recommended diet consistencies. RN reports patient is tolerating PO intake without overt clinical s/s of aspiration. patient will tolerate Mechanical Soft (chopped) consistency and Thin liquids without overt signs or symptoms of aspiration with 100 % accuracy over 2 session(s).  progress   Goal #2 The patient/family/caregiver will demonstrate understanding and implementa

## 2020-11-13 NOTE — PLAN OF CARE
Clifford Summerville  4/15/1960  Temp: 98.3 °F (36.8 °C)  Pulse: 94  Resp: 18  BP: 151/77    Patient with nauseas this am at rest, + belching, + BS, + flatus, BP last evening. Please read yesterdays nursing note in regards to episode yesterday evening.  Ever sin

## 2020-11-13 NOTE — PLAN OF CARE
Pt with a 500 cc brown emesis after only drinking water and gerard crackers. Occult blood from previous stool sent then patient had another BM that did appear black. Dr. Herrera Munguia notified, awaiting hemoccult results. Hemoccult + for OB.  Gi consult call

## 2020-11-13 NOTE — PLAN OF CARE
Pt with a 500 cc brown emesis after only drinking water and gerard crackers. Occult blood from previous stool sent then patient had another BM that did appear black. Dr. Miguel Chopra notified, awaiting hemoccult results.

## 2020-11-13 NOTE — PLAN OF CARE
1900: pt diaphoretic, nauseous, and lightheaded while sitting on toilet. Assisted back into chair. /76. MD notified. Will endorse orthostatic BPs to [address] Nedra Hernandez RN. Resting comfortably in bed. Denies nausea at this time. Wife at bedside.

## 2020-11-13 NOTE — PLAN OF CARE
Resumed care of pt. At [address]. Pt. Is Ax4, sitting in chair.   Pt. Just prior to shift change got diaphoretic, dizzy, and felt syncopal.  Orthostats were negative    Zofran X2 via MAR    Ampicillin    Transfer to [facility] today      Problem: NEUROLOGICAL -

## 2020-11-13 NOTE — CM/SW NOTE
UPDATE 3:37pm  DC cancelled due to pt vomiting.        Expected Discharge Plan:    Destination:  Acute Rehab: [facility]  Call for report to: [phone]  Room # [phone]  Transportation provider-[facility]  Form on Chart: Yes   DC Time: 3pm  Current B

## 2020-11-14 NOTE — PROGRESS NOTES
11/14/20 0130 11/14/20 0400   Provider Notification   Reason for Communication Change in status Other (comment)  (Ct results)   Provider Name   ([address] [place] [address] )   (Esme Jalloh)   Method of Communication Page Page   Response See orders No new orders

## 2020-11-14 NOTE — PLAN OF CARE
Problem: NEUROLOGICAL - ADULT  Goal: Remains free of injury related to seizure activity  Description: INTERVENTIONS:  - Maintain airway, patient safety  and administer oxygen as ordered  - Monitor patient for seizure activity, document and report tobiaso administer replacement therapy as ordered  Outcome: Progressing     Problem: Impaired Activities of Daily Living  Goal: Achieve highest/safest level of independence in self care  Description: Interventions:  - Assess ability and encourage patient to partic achieve maximal communication potential  Description: Interventions:  - Encourage slowed rate of speech and over articulation for improved intelligibility  Outcome: Progressing

## 2020-11-14 NOTE — RESPIRATORY THERAPY NOTE
VENKATA - Equipment Use Daily Summary:  · Set Mode BIPAP  · Usage in hours: 2:18  · 90% Pressure (EPAP) level: 10  · 90% Insp Pressure (IPAP): 20  · AHI: 2.2  · Supplemental Oxygen:   · Comments:

## 2020-11-14 NOTE — DIETARY NOTE
[address]     Admitting diagnosis:  Elevated troponin [R77.8]  Acute ischemic stroke [facility]) [I63.9]  Hypertensive emergency without congestive heart failure [I16.1]    Ht: 175.3 cm (5' 9\")  Wt: (!) 195 kg (430

## 2020-11-14 NOTE — CONSULTS
Gastroenterology Initial Consultation  I have personally seen and examined the patient.     Patient Name: Montez Thurman  Referring physician: Dr. Magali Torres  Reason for consultation: Coffee ground emesis / melena  CC: Coffee ground emesis / melena  HPI: Thompson Calderon PSHx:   Past Surgical History:   Procedure Laterality Date   • COLONOSCOPY N/A 11/19/2019    Performed by Severo Posey, MD at [facility] ENDOSCOPY   • COLONOSCOPY N/A 7/9/2019    Performed by Severo Posey, MD at [facility] ENDOSCOPY   • COLONOSCOPY N/A 4/1/2 suppository 300 mg, 300 mg, Rectal, Daily    Or    •  aspirin tab 325 mg, 325 mg, Oral, Daily    •  enoxaparin sodium (LOVENOX) 100 MG/ML injection 100 mg, 0.5 mg/kg, Subcutaneous, Daily    •  atorvastatin (LIPITOR) tab 80 mg, 80 mg, Oral, Nightly    •  [E patient reports no history of seizure, stroke, or frequent headaches    PE: /57 (BP Location: Right arm)   Pulse 78   Temp 98.2 °F (36.8 °C) (Oral)   Resp 22   Ht 69\"   Wt (!) 430 lb (195 kg)   SpO2 98%   BMI 63.50 kg/m²   Gen: Awake, but drowsy - a Labs   Lab 11/14/20  0130   RBC 3.26*   HGB 11.7*   HCT 34.1*   .6*   MCH 35.9*   MCHC 34.3   RDW 14.4   NEPRELIM 8.17*   WBC 12.7*   .0       Recent Labs   Lab 11/07/20  1409 11/08/20  0841 11/14/20  0130   ALT 63* 52 60   AST 92* 74* 82* that he may have had some gastric mucosal injury / ulcer formation.   He has had no further evidence of overt bleeding since yesterday, suggesting that if he is bleeding, it is very slow - or, he may have had some bleeding in the past few days, which is now

## 2020-11-14 NOTE — PROGRESS NOTES
[facility] HOSPITALIST  Progress Note     Fransisco Chau Patient Status:  Inpatient    4/15/1960 MRN [id number]   [facility] 3NE-A Attending Grayson Barrios, [address] [facility] Day # 7 PCP Shireen Bergeron MD     Chief Complaint: Speech difficulty    S: Roselyn Steward 136 139  --  139  139   K 3.6 3.6 4.1 4.1  4.1    109  --  109  109   CO2 22.0 26.0  --  25.0  25.0   ALKPHO 158* 125*  --  110   AST 92* 74*  --  82*   ALT 63* 52  --  60   BILT 3.6* 3.1*  --  1.9   TP 7.2 5.8*  --  5.6*       Estimated Creatinine C acute rehab, PM&R consult placed  2. D/c planning to acute rehab.  Hold DC due to above    Quality:  · DVT Prophylaxis: lovenox  · CODE status: full  · Rivera: no  · Central line: no  · If COVID testing is negative, may discontinue isolation: yes     Will th

## 2020-11-14 NOTE — PROGRESS NOTES
[id number] Neurology Progress Note    Margo Galindo Patient Status:  Inpatient    4/15/1960 MRN [id number]   [facility] 3NE-A Attending Rafaela Finn MD   [facility] Day # 7 PCP Julian Newton MD       [facility]      Neurology At similar to the density of the right MCA. No evidence of hemorrhage. Small old infarct within the left caudate. Intracranial atherosclerosis is noted. Mild chronic small vessel ischemic disease.   No evidence of hydrocephalus    A/P:   Altered mental s close eyes again. Frequent movements noted with right side stronger than left. States \"30\" repeatedly to all questions asked. Did shake examiner's hand when asked, but then would continue to mime shaking hands after examiner had let go.  Unable to [employer] Neither posterior communicating artery is well delineated. 11/8/2020 Echocardiogram with bubbles  EF 65-70%. Atrial septum echo contrast study showed no shunt.      11/7/2020 MRI Brain with and without  1.5cm focus of restricted diffusion consistent w

## 2020-11-14 NOTE — PLAN OF CARE
Pt. A&Ox4 when received at [address]. Neuro check completed Q shift- L sided deficit seen- as previously noted. @ 0100, pt became altered, responding intermittently to orientation questions- A&Ox self; says name and that he is in [place].  Pt seems to be going

## 2020-11-15 PROBLEM — K76.82 HEPATIC ENCEPHALOPATHY (HCC): Status: ACTIVE | Noted: 2020-01-01

## 2020-11-15 PROBLEM — K72.90 HEPATIC ENCEPHALOPATHY (HCC): Status: ACTIVE | Noted: 2020-01-01

## 2020-11-15 NOTE — PROGRESS NOTES
Pharmacy Note:  Antimicrobial Weight Dose Adjustment for: ampicillin          Bebe Larson is a 64year old male who has been prescribed ampicillin 500mg q6hr. Est CrCl: >100 mL/min    Weight 195kg / Body mass index is 63.5 kg/m².     Based on rosalinda

## 2020-11-15 NOTE — SLP NOTE
Spoke with RN regarding patient status. RN requesting hold on speech session at this time secondary to lethargy.

## 2020-11-15 NOTE — PLAN OF CARE
Assumed care at 0730. Pt a/ox1, repeating name, and when asking where he is, repeats 17, and 18. Unable to answer questions appropriately. Unable to complete neuro assessment as pt is not following commands at this time. Stroke protocol.  Pt refusing teleme stated pain goal  Description: INTERVENTIONS:  - Encourage pt to monitor pain and request assistance  - Assess pain using appropriate pain scale  - Administer analgesics based on type and severity of pain and evaluate response  - Implement non-pharmacologi self-care    Outcome: Progressing     Problem: Impaired Swallowing  Goal: Minimize aspiration risk  Description: Interventions:  - Patient should be alert and upright for all feedings (90 degrees preferred)  - Offer food and liquids at a slow rate  - Encou

## 2020-11-15 NOTE — PROGRESS NOTES
Pharmacy Note:  Antimicrobial Weight Dose Adjustment for: piperacillin/tazobactam (Jesus Dalal)         Saskia Shoulder is a 64year old male who has been prescribed piperacillin/tazobactam 3.375g q8hr.       Est CrCl: >100 mL/min    Weight 195kg / Body mass in

## 2020-11-15 NOTE — PROGRESS NOTES
[address] [address] Neurology Progress Note    Gloriaalvaro Browns Patient Status:  Inpatient    4/15/1960 MRN [id number]   [facility] 3NE-A Attending Heavenly Stephen MD   Hosp Day # 8 PCP Catie Vargas MD     Neurology Attending note      I hav 109  109 113*   CO2  --  25.0  25.0 24.0   ALKPHO  --  110  --    AST  --  82*  --    ALT  --  60  --    BILT  --  1.9  --    TP  --  5.6*  --      Ammonia- 115    CONCLUSION:    1. Evolving small to moderate-sized acute infarct within the right michel.   No improve will consider repeating brain imaging.     Hold Plavix given AMS. Continue Aspirin rectal suppository   GI service on board    Avoid sedatives and narcotics    Please call me if any changes.  D/w patient' nurse at bedside    Clinton Calvert MD   E CREATSERUM 0.51 11/15/2020    BUN 31 11/15/2020     11/15/2020    K 4.0 11/15/2020     11/15/2020    CO2 24.0 11/15/2020     11/15/2020    CA 8.2 11/15/2020    PTT 37.3 11/14/2020    INR 1.36 11/14/2020    PTP 17.2 11/14/2020    PGLU 161 Echocardiogram with bubbles  EF 65-70%. Atrial septum echo contrast study showed no shunt.      11/7/2020 MRI Brain with and without  1.5cm focus of restricted diffusion consistent with acute infarction involving the right side of the michel.   No significan

## 2020-11-15 NOTE — PROGRESS NOTES
Gastroenterology Progress Note  Patient Name: Jailene Fischer  Chief Complaint: Altered mental status; Hyperammonemia  S: The patient continues to be confused and disoriented. No abdominal pain. No vomiting. No diarrhea. No melena.    O: /66   Pul it is certainly possible that he has cirrhosis. It is somewhat unusual for a patient to go from intact and clear mentation, to completely confusional state overnight. However, in the absence of obvious etiology, it is reasonable to consider and treat.

## 2020-11-15 NOTE — CONSULTS
NAME: Austin Arshad - ROOM: 0654/1315-E - MRN: [id number] - Age: 64year old - :  4/15/1960    Date of Admission: 2020  2:00 PM  Admission Diagnosis: Elevated troponin [R77.8]  Acute ischemic stroke [facility]) [I63.9]  Hypertensive emergency without co ENDOSCOPY   • COLONOSCOPY N/A 7/9/2019    Performed by Hudson Boyer MD at [facility] ENDOSCOPY   • COLONOSCOPY N/A 4/1/2016    Performed by Felisha Bliss MD at [facility] ENDOSCOPY   • CYST/MASS EXCISION Right 1/23/2019    Performed by Walt Daniel MD Sodium  10 mg Oral Daily   • enoxaparin  0.5 mg/kg Subcutaneous Daily   • atorvastatin  80 mg Oral Nightly         Continuous Infusing Medication:  • sodium chloride 50 mL/hr at 11/15/20 1512       PRN Medication:acetaminophen **OR** acetaminophen, Labetal PPI  - Transfuse for goal hgb > 7  - Hold off on scope at this time   5. FEN  - NPO  - LMWH  - SCDs  6.  Dispo  - ICU   - At high risk for decompensation       Critical Care time: 35 minutes     Elvin iPper MD  Pulmonary and Critical Care

## 2020-11-15 NOTE — PHYSICAL THERAPY NOTE
Attempted to see pt for PT session this AM and pt confused and disoriented at present. Per RN, hold PT session today. Will continue to follow. Thank you.

## 2020-11-15 NOTE — RESPIRATORY THERAPY NOTE
VENKATA - Equipment Use Daily Summary:  · Set Mode BIPAP  · Usage in hours: 0;06  · 90% Pressure (EPAP) level: 10  · 90% Insp Pressure (IPAP): 20  · AHI: 10.0  · Supplemental Oxygen:   · Comments:

## 2020-11-15 NOTE — PROGRESS NOTES
[facility] HOSPITALIST  Progress Note     Jetty Shoulder Patient Status:  Inpatient    4/15/1960 MRN [id number]   [facility] 3NE-A Attending Jamal Amado, [address] [facility] Day # 8 PCP Rafaela Ang MD     Chief Complaint: Speech difficulty    S: Glenda Casas 106  106 118   CA  --  8.2*  8.2* 8.2*   ALB  --  2.3*  --    NA  --  139  139 143   K 4.1 4.1  4.1 4.0   CL  --  109  109 113*   CO2  --  25.0  25.0 24.0   ALKPHO  --  110  --    AST  --  82*  --    ALT  --  60  --    BILT  --  1.9  --    TP  --  5.6*  -- reviewed  4. Cardiology follwoing  5. Hypertensive emergency  1. BP improved with norvasc   2. PRN lopressor  6. Hyperglycemia  1. A1c 5.7  7. Chronic LFT elevation  1. Near baseline  2. Previous U/S with fatty liver  8. Chronic thrombocytopenia  1.  Monito

## 2020-11-15 NOTE — PLAN OF CARE
Pt. A&ox self- neuro check DRAKE  RA- Not tolerating CPAP; will take mask off  Lactulose- no BM yet  IV ampicillin  IV Protonix  PIV SL  No improvement on mentation  Staff will continue to monitor     Problem: NEUROLOGICAL - ADULT  Goal: Remains free of inju if indicated  - Evaluate effectiveness of antiarrhythmic and heart rate control medications as ordered  - Initiate emergency measures for life threatening arrhythmias  - Monitor electrolytes and administer replacement therapy as ordered  Outcome: Progressi

## 2020-11-16 NOTE — OCCUPATIONAL THERAPY NOTE
Patient was transferred to CCU on 11/15 with altered mental status, lethargy, and acute respiratory failure. Work-up pending. Intubated and sedated. Therapy will re-evaluate the patient once the patient is medically stable and appropriate for therapy.

## 2020-11-16 NOTE — PROGRESS NOTES
Assumed care at 0730. Pt minimally responsive, not following commands, does open eyes, and moans to sternal rubs. Pt does not appear to be in pain. Placed back on telemetry this afternoon and is in ST. VSS, on RA with saturations in 90s.  Dr. Yas Quiroga notif

## 2020-11-16 NOTE — PLAN OF CARE
Pt received intubated and sedated on full vent support. Pt pupils equal and reactive to light. Pt will CATALAN to tactile/painful stimuli R>L. Pt withdraws from painful stimuli. Does not follow any commands. Lungs coarse, diminished bases bilaterally.  [address]

## 2020-11-16 NOTE — PROCEDURES
Date of Procedure: 11/16/2020    Procedure: EEG (ELECTROENCEPHALOGRAM)   START TIME:11/15/2020  @ 7460  END TIME: 11/16/2020 @ 1056  DURATION 16.5 HRS  HISTORY: 64year old male with right pontine stroke and hyperammonemia and AMS.   Extended EEG requesting metabolic encephalopathy, there is no electrographic evidence of status epilepticus. Clinical correlation is recommended.     Hendricks Castleman, MD  Neurology  [facility]  11/16/2020, 1:11 PM  Katherine Colin MD

## 2020-11-16 NOTE — RESPIRATORY THERAPY NOTE
Called to assist with intubation. Patient was intubated with a 8.0 ETT. Secured at 22 @ lip. Yellow color change on colorimetric CO2 detector and bilateral breath sounds noted. X-ray ordered. Patient placed on ventilator.  Settings: PRVC 20/550/100%/+5:

## 2020-11-16 NOTE — ANESTHESIA PROCEDURE NOTES
Airway  Date/Time: 11/15/2020 5:58 PM  Urgency: emergent    Airway not difficult    General Information and Staff    Patient location during procedure: ICU  Anesthesiologist: Sofie Vallejo MD  Performed: anesthesiologist     Consent for Airway (if performed

## 2020-11-16 NOTE — PROGRESS NOTES
[address] [facility] Gastroenterology     Jadyn Hurtado Patient Status:  Inpatient    4/15/1960 MRN [id number]   [facility] 6NE-A Attending Sebastián Garner MD   [facility] Day # 9 PCP Katherine Colin MD       R 1.36*  --   --   --   --        Recent Labs   Lab 11/14/20  0130 11/15/20  0615 11/16/20  0424   *  144* 158* 153*   BUN 30*  30* 31* 48*   CREATSERUM 0.67*  0.67* 0.51* 0.94   GFRAA 122  122 137 103   GFRNAA 106  106 118 89   CA 8.2*  8.2* 8.2* 7.5 dopplers  -Trend LFTs daily; will order INR  -PPI once daily  -Sedation/hemodynamics per ICU  -will need EGD once mental status stabilized. Thank you for allowing us to participate in patient's care. Call with questions. GI will follow.      Aracelis Hobbs

## 2020-11-16 NOTE — PROGRESS NOTES
Received patient on VC+ 20/550/40%/+5. Changed Vent settings to VC+ 16/550/40%/+5 per Dr. Maverick Dawkins. ABG done. Breath sounds are bilaterally diminished. Suctioning small amounts of thick yellow secretions. Will continue to monitor.       11/16/20 1529   Vent In

## 2020-11-16 NOTE — PLAN OF CARE
I spoke with pt spouse at bedside. We reviewed current medical conditions and updated on her status, MRI, ammonia, presumed aspiration pna.       Magali DIAS  4:18 PM

## 2020-11-16 NOTE — PROCEDURES
ELECTROENCEPHALOGRAM REPORT      Patient Name: Nima King  Chart ID: [id number]  Ordering Physician: No name on file.  Date of Test: 11/15/2020  Referring Physician:   Patient Diagnosis: Encephalopathy    HISTORY  64year old male with right pontine str

## 2020-11-16 NOTE — CM/SW NOTE
Care Progression Note:  Active Acute Medical Issue:   Acute ischemic stroke [facility])- altered mental status yesterday, transferred to CNICU, intubated. EEG done. MRI ordered. Other Contributing Medical Factors/Dx. :   Anxiety, heart murmur, depression, fat

## 2020-11-16 NOTE — PLAN OF CARE
1800 Pt received from CTU, lethargic, sonorous respirations, anesthesia, Dr. Anastacio Watson, and pulm, Dr. Tarun Queen at bedside. Pt intubated, propofol started. Continuous EEG. NG to LIS initiated, coffee ground output. Neuro intensivist updated.   Endorsed to [employer]

## 2020-11-16 NOTE — PHYSICAL THERAPY NOTE
Patient was transferred to CCU on 11/15 with altered mental status, lethargy, and acute respiratory failure. Work-up pending. Intubated and sedated.  Therapy will re-evaluate the patient once the patient is medically stable and appropriate for therapy

## 2020-11-16 NOTE — PROGRESS NOTES
Patient received intubated and on full ventilator support. PRVC 20/550/100%+5.  ABG was performed:    ABG pH 7.37     ABG pCO2 41 mm Hg    ABG pO2 248High  mm Hg    ABG HCO3 23.0 mEq/L    ABG Base Excess -2.3Low  mmol/L    ABG O2 Saturation 98 %    Calculat

## 2020-11-16 NOTE — PROGRESS NOTES
[facility] HOSPITALIST  Progress Note     Jorge Valenzuela Patient Status:  Inpatient    4/15/1960 MRN [id number]   [facility] 3NE-A Attending Dr. Pita Matt # 5 PCP Catie Vargas MD     Chief Complaint: Speech difficulty    S: Patient w 1.9  --  1.6   TP 5.6*  --  4.8*       Estimated Creatinine Clearance: 85.7 mL/min (based on SCr of 0.94 mg/dL). Recent Labs   Lab 11/14/20  1136   PTP 17.2*   INR 1.36*       No results for input(s): TROP, CK in the last 168 hours.          Imaging: Shahida 10. Macrocytic Anemia, slight decrease, partly dilutional, monitor  11. Chronic thrombocytopenia, stable  12. Enterococcus UTI, resolved  13. Urinary retention  1. Straight cath prn, consider randall  14. Diffuse edema  1. Dopplers pending  15.  Morbid obes

## 2020-11-16 NOTE — PROGRESS NOTES
NEUROCRITICAL CARE PROGRESS NOTE    Ruben Talbot Patient Status:  Inpatient    4/15/1960 MRN [id number]   [facility] 6NE-A Attending Jailene Deleon MD   [facility] Day # 5 PCP Chastity Snowden MD     Chief Complaint/Reason for Admission:  Gait ins triphasic waves. 11/16: Remains comatose. No change in neurological examination. Unable to follow commands. Remains with persistent hyperammonemia. Remains intubated on full mechanical ventilation.   Ampicillin was discontinued and Zosyn started for pending    Heme/Onc:  Megaloblastic anemia  - Check Vitamin U94 and Folic acid levels  - Monitor Hb/Hct  - Monitor Coags  Recent Labs   Lab 11/15/20  0615 11/15/20  1536 11/16/20  0424   HGB 10.3* 10.5* 9.8*   HCT 29.0*  --  28.5*   .0  --  177.0 [address] filed at 11/16/2020 0514  Gross per 24 hour   Intake 2388.46 ml   Output 1370 ml   Net 1018.46 ml     Wt Readings from Last 3 Encounters:  11/16/20 : (!) 318 lb 9 oz (144.5 kg)  11/16/20 : (!) 318 lb 9 oz (144.5 kg)  11/07/20 : (!) 450 lb ( life-threatening deterioration required my full and direct attention, intervention and personal management while the patient was critical. I provided critical care services including: Interpreting diagnostic studies;  Medication orders and management; Vital

## 2020-11-16 NOTE — PROGRESS NOTES
Prelim EEG report:    Reviewed Stat EEG done as wellas cEEG. Appears with generalized slowing in delta range with periodic generalized discharges with triphasic morphology.  Appears disappeared after patient was put on sedation (probably after intubation) n

## 2020-11-16 NOTE — PROGRESS NOTES
[address] Patient Status:  Inpatient    4/15/1960 MRN [id number]   [facility] 6NE-A Attending Sebastián Garner MD   [facility] Day # 9 PCP Katherine Colin MD     Critical Care Progress Note     Date of Admission: 2020  2: enoxaparin sodium (LOVENOX) 100 MG/ML injection 100 mg, 0.5 mg/kg, Subcutaneous, Daily  •  atorvastatin (LIPITOR) tab 80 mg, 80 mg, Oral, Nightly    Facility-Administered Medications Ordered in Other Encounters:   •  etomidate (AMIDATE) solution, , Liliam Ayers 11/16/2020    BILT 1.6 11/16/2020    ALB 2.1 11/16/2020    TP 4.8 11/16/2020     Lab Results   Component Value Date    INR 1.36 (H) 11/14/2020           Imaging: I have independently visualized all relevant chest imaging in PACS.   I agree with the radiolog

## 2020-11-17 NOTE — OCCUPATIONAL THERAPY NOTE
Remains intubated and sedated. Therapy will re-evaluate the patient once the patient is medically stable and appropriate for therapy.

## 2020-11-17 NOTE — PHYSICAL THERAPY NOTE
Remains intubated and sedated.    Therapy will re-evaluate the patient once the patient is medically stable and appropriate for therapy

## 2020-11-17 NOTE — PROGRESS NOTES
NEUROCRITICAL CARE PROGRESS NOTE    Jorgito Kenny Patient Status:  Inpatient    4/15/1960 MRN [id number]   [facility] 6NE-A Attending Feroz Aranda MD   Hosp Day # 8 PCP Miguel Anaya MD     Chief Complaint/Reason for Admission:  Gait in with triphasic waves. 11/16: Remains comatose. No change in neurological examination. Unable to follow commands. Remains with persistent hyperammonemia. Remains intubated on full mechanical ventilation.   Ampicillin was discontinued and Zosyn started at 11/17/2020 1155  Last data filed at 11/17/2020 0710  Gross per 24 hour   Intake 1707 ml   Output 150 ml   Net 1557 ml     Resp:  Acute respiratory failure, secondary to comatose state  - VAP precuations  - Pulmonology following, follow recs  - nebs prn push  40 mg Intravenous BID   • Montelukast Sodium  10 mg Oral Daily   • atorvastatin  80 mg Oral Nightly     Continuous Infusions:  • norepinephrine 3 mcg/min (11/17/20 9754)   • vasopressin (PITRESSIN) infusion for shock 0.04 Units/min (11/16/20 2100) 11/17/20  0549   WBC 10.5  --  19.8* 18.0*   RBC 2.79*  --  2.63* 2.36*   HGB 10.3* 10.5* 9.8* 9.3*   HCT 29.0*  --  28.5* 26.2*   .9*  --  108.4* 111.0*   MCH 36.9*  --  37.3* 39.4*   .0  --  177.0 186.0     Recent Labs     11/15/20  0615 11

## 2020-11-17 NOTE — PROGRESS NOTES
[address] Gastroenterology     Emil Job Patient Status:  Inpatient    4/15/1960 MRN [id number]   [facility] 6NE-A Attending Kym Sawant MD   [facility] Day # 10 PCP Caio Martinez MD --  166.0  --  177.0  --  186.0   INR  --   --  1.36*  --   --   --   --  1.56* 1.51*       Recent Labs   Lab 11/14/20  0130 11/15/20  0615 11/16/20  0424 11/17/20  0549   *  144* 158* 153* 254*   BUN 30*  30* 31* 48* 45*   CREATSERUM 0.67*  0.67* 0 titrate to 3-4 BMs  -Rifaximin 550 BID  -Trend LFTs daily;   -PPI once daily  -Sedation/hemodynamics per ICU  -will need EGD once mental status/clinical stabilized or if overt GI bleeding. GI will follow. Page with questions.      Nile Caldwell MD  11/16

## 2020-11-17 NOTE — DIETARY NOTE
[facility]    NUTRITION INITIAL ASSESSMENT    Pt does not meet malnutrition criteria. NUTRITION DIAGNOSIS/PROBLEM:    Inadequate oral intake related to physiological causes as evidenced by NPO status and intubation.      NUTRITION INTERVENTION: RELATED HISTORY:  Appetite: N/A  Intake: 0%  Intake Meeting Needs: TF meeting needs  Food Allergies: No  Cultural/Ethnic/Congregation Preferences Addresses: Yes    NUTRITION RELATED PHYSICAL FINDINGS:     1. Body Fat/Muscle Mass: well nourished per EMR.      2

## 2020-11-17 NOTE — PROGRESS NOTES
[address] Lawrence[address] Mendoza Janelle Patient Status:  Inpatient    4/15/1960 MRN [id number]   [facility] 6NE-A Attending Rafaela Finn MD   Psychiatric Day # 10 PCP Julian Newton MD     Critical Care Progress Note     Date of Admission: 2020  2 injection 10 mg, 10 mg, Intravenous, Q10 Min PRN  •  hydrALAzine HCl (APRESOLINE) injection 10 mg, 10 mg, Intravenous, Q2H PRN  •  ondansetron HCl (ZOFRAN) injection 4 mg, 4 mg, Intravenous, Q6H PRN **OR** [DISCONTINUED] Metoclopramide HCl (REGLAN) injecti 112 11/17/2020    CO2 25.0 11/17/2020    BUN 45 11/17/2020    CREATSERUM 1.08 11/17/2020     11/17/2020    CA 6.6 11/17/2020    ALKPHO 109 11/17/2020     11/17/2020     11/17/2020    BILT 2.1 11/17/2020    ALB 2.0 11/17/2020    TP 4.8

## 2020-11-17 NOTE — PLAN OF CARE
Received pt at 1930. Pt sedated on propofol and not following commands, extending all extremities to pain. Dr Joann Short at bedside, STAT MRI ordered, ok to stop continuous EEG. STAT MRI done, told to keep -200, ok to use levo if needed.  ROBERT SOTO and

## 2020-11-17 NOTE — PLAN OF CARE
Patient intubated and sedated. Levo gtt (titrated down) Vaso gtt. For -200. Flexiseal intact. Patient stable and will continue to monitor.

## 2020-11-17 NOTE — PROGRESS NOTES
[facility] HOSPITALIST  Progress Note     Del Gram Patient Status:  Inpatient    4/15/1960 MRN [id number]   [facility] 3NE-A Attending Dr. Nga Dixon # 8 PCP Gne Garza MD     Chief Complaint: Speech difficulty    S: Patient   139 143 147* 141   K 4.1  4.1 4.0 4.2 4.2     109 113* 116* 112   CO2 25.0  25.0 24.0 26.0 25.0   ALKPHO 110  --  101 109   AST 82*  --  129* 142*   ALT 60  --  95* 114*   BILT 1.9  --  1.6 2.1*   TP 5.6*  --  4.8* 4.8*       Estimated Crea today  2. Lactulose, xifixan -ammonia down  3. GI following  4. hepatitis panel done  7. Demand ischemia secondary to HTN emergency   1. Tele, Echo reviewed, Cardiology follwoing  8.  Hypertensive emergency resolved   1. now low side on sedation, BP paramet

## 2020-11-17 NOTE — SLP NOTE
Attempted to see patient however since last SLP session, Pt has been intubated to vent and transferred to CCU. Will await new orders once patient is medically stable and appropriate for therapy.   Elza Phalen, MS CCC-SLP/L, pager [phone]  Speech-LanguagePatho

## 2020-11-18 NOTE — CM/SW NOTE
EEG= status epilepticus. Versed gtt. Patient remains vented, sedated. / to remain available for support and/or discharge planning.

## 2020-11-18 NOTE — PROGRESS NOTES
Jackline Heimlich Patient Status:  Inpatient    4/15/1960 MRN [id number]   [facility] 6NE-A Attending Keo Merino MD   [address] Day # 6 PCP Kristi Reynolds MD     Critical Care Progress Note      Assessment/Plan:  1.  Altered mental status  - C lactulose  30 g Per NG Tube TID   • LORazepam  0.5 mg Intravenous Once    Or   • LORazepam  1 mg Intravenous Once   • pantoprazole (PROTONIX) IV push  40 mg Intravenous BID   • Montelukast Sodium  10 mg Oral Daily   • atorvastatin  80 mg Oral Nightly chest imaging in PACS, agree with radiology interpretation except where noted.

## 2020-11-18 NOTE — PROGRESS NOTES
NEUROCRITICAL CARE PROGRESS NOTE    Geeta Singh Patient Status:  Inpatient    4/15/1960 MRN [id number]   [facility] 6NE-A Attending Darcy Mayberry MD   [facility] Day # 6 PCP Mayuri Gonzalez MD     Chief Complaint/Reason for Admission:  Gait in with triphasic waves. 11/16: Remains comatose. No change in neurological examination. Unable to follow commands. Remains with persistent hyperammonemia. Remains intubated on full mechanical ventilation.   Ampicillin was discontinued and Zosyn started Hypercoagulable workup essentially negative, decreased antithrombin III, protein S level likely due to concomitant enoxaparin use  - Telemetry monitoring  - Physical therapy and occupational therapy  - Rehab consultation  - IPC + enoxparin for VTE preventi necessary    Skin/Muskuloskelatal:  No skin breakdown  - Standard skin care  - Turning/reposition per protocol        Subjective:  As above    New Complaints:  As above    Medications:  • potassium chloride  40 mEq Per NG Tube Q4H   • Chlorhexidine Glu[place]a ND/NT. BS +  Skin: No skin breakdown or ulcers.   Extremities: Bilateral extremity edema  Neurological Examination:  Neurological examination limited secondary to sedation    Ancillary Data:  Labs:  Recent Labs     11/16/20  0424 11/17/20  0549 11/18/20  04

## 2020-11-18 NOTE — PROGRESS NOTES
ESHA HOSPITALIST  Progress Note     Roberthgretchen Pendleton Patient Status:  Inpatient    4/15/1960 MRN [id number]   [facility] 3NE-A Attending Dr. Holguin Lab # 6 PCP Nivia Mckee MD     Chief Complaint: Speech difficulty    S: Patient 1.7*   * 141 148*   K 4.2 4.2 3.2*   * 112 120*   CO2 26.0 25.0 24.0   ALKPHO 101 109 99   * 142* 108*   ALT 95* 114* 95*   BILT 1.6 2.1* 1.6   TP 4.8* 4.8* 4.3*       Estimated Creatinine Clearance: 113.4 mL/min (based on SCr of 0.71 mg CBCs  3. Off plavix now - last dose 11/14  4. PPI IV BID, monitor stools  5. EGD once able  7. Elevated ammonia, transaminitis, improving  1. US fatty liver, reviewed US x2, negative doppler, INR stable, lfts/bili decreasing  2. Lactulose, xifixan   3.  GI discharge, intubated  CV: RRR, no murmurs  Lungs: CTAB, no wheezes  GI: soft, NTND  Neuro: L side flaccid  Skin: warm, no rashes  Psych: unable to obtain      Cherie Burgos MD

## 2020-11-18 NOTE — PLAN OF CARE
Problem: NEUROLOGICAL - ADULT  Goal: Remains free of injury related to seizure activity  Description: INTERVENTIONS:  - Maintain airway, patient safety  and administer oxygen as ordered  - Monitor patient for seizure activity, document and report tobiaso Respiratory Therapy support as indicated  - Manage/alleviate anxiety  - Monitor for signs/symptoms of CO2 retention  Outcome: Not Progressing   Received this a.m., intubated and sedated on versed, continuous EEG, no response to pain noted.  Versed gtt incre

## 2020-11-18 NOTE — PLAN OF CARE
Assumed care of patient around 0730. Propofol turned off for assessments. Pupils equal and reactive to light. +Cough and +gag. Extension to pain. 24 hour continuous EEG placed on, pt in status epilepticus.  Versed push and gtt initiated, lacosamide starte

## 2020-11-18 NOTE — PROCEDURES
Date of Procedure: 11/18/2020    LTM   START DATE & TIME: 11/17/20 @12:45PM  END DATE & TIME:11/18/20 @11:27 AM  TOTAL HRS OF RECORDING:> 22 hours    DX:ELEVATED TROPONIN  HX:A 59YO MALE WHO PRESENTED TO [facility] ON 11/7 WITH NEW ONSET SPEECH ABNORMALITY W organized. PUSH BUTTON EVENTS:  There were No button events.    EPILEPTIFORM DISCHARGES:  There were prominent generalized epileptiform discharges or bilateral periodic lateralized epileptiform discharges (PLEDs) recorded consistent with electrographic sta

## 2020-11-18 NOTE — RESPIRATORY THERAPY NOTE
Received pt vented, PRVC 16/550/40%/+5, tolerating well. No changes made. Breath sounds diminished, rhonchi at times. Will continue to monitor closely.

## 2020-11-18 NOTE — PLAN OF CARE
Patient VSS. EEG for seizure activity. No signs of seizures over night. Versed gtt  and Levo gtt. Bladder scanned then straight cath with 10F urthral catheter.

## 2020-11-19 NOTE — PROGRESS NOTES
11/19/20 0526   Vent Information   $ RT Standby Charge (per 15 min) 1   Ventilator Initiation 11/15/20   Ventilation Day(s) 5   Interface Invasive   Vent Type Si   Vent ID 2   Vent Mode PRVC/AC   Settings   FiO2 (%) 40 %   Resp Rate (Set) 16   Vt (Set,

## 2020-11-19 NOTE — PLAN OF CARE
Pt received intubated and sedated on full vent support. Pt pupils equal and reactive to light. Pt has no spontaneous movements of any extremity and no movement to painful stimuli. Pt remains on continuous EEG. Lungs coarse bilaterally.  Suctioning thick yel

## 2020-11-19 NOTE — PROCEDURES
Date of Procedure: 11/19/2020    LTM Video EEG  START DATE & TIME: 11/18/20 @11:28 AM  END DATE & TIME:11/19/20 @11:28 AM  TOTAL HRS OF RECORDING 24hours     DX:ELEVATED TROPONIN  HX:A 61YO MALE WHO PRESENTED TO [facility] ON 11/7 WITH NEW ONSET SPEECH ABNOR discharges or bilateral periodic lateralized epileptiform discharges (PLEDs) were now replaced by generalized slowing and triphasic waves. HYPERVENTILATION: Hyperventilation was not performed. PHOTIC STIMULATION: Photic stimulation was not performed.

## 2020-11-19 NOTE — PROGRESS NOTES
ESHA HOSPITALIST  Progress Note     Lolly Muniz Patient Status:  Inpatient    4/15/1960 MRN [id number]   [facility] 3NE-A Attending Dr. Doe Chaney # 15 PCP Jovani Monsalve MD     Chief Complaint: Speech difficulty    S: Patient 2. 0* 1.7*  --  1.9*    148*  --  150*   K 4.2 3.2* 4.3 4.7  4.7    120*  --  122*   CO2 25.0 24.0  --  28.0   ALKPHO 109 99  --  118*   * 108*  --  185*   * 95*  --  119*   BILT 2.1* 1.6  --  1.5   TP 4.8* 4.3*  --  4.6*       Est dark brown  3. Off plavix now - last dose 11/14  4. PPI IV BID, monitor stools  5. EGD once able per GI  7. Elevated ammonia, improved; transaminitis increasing  1. US fatty liver, reviewed US x2, negative doppler  2.  INR improving, lfts increased - monito

## 2020-11-19 NOTE — PROGRESS NOTES
[address] [facility]     Fransisco Chau Patient Status:  Inpatient    4/15/1960 MRN [id number]   [facility] 6NE-A Attending Ernesto Alexander MD   [facility] Day # 6 PCP Shireen Bergeron MD --  1.56* 1.51* 1.65*  --     < > = values in this interval not displayed.        Recent Labs   Lab 11/16/20  0424 11/17/20  0549 11/18/20 0440 11/18/20  1738   * 254* 241*  --    BUN 48* 45* 31*  --    CREATSERUM 0.94 1.08 0.71  --    GFRAA 103 8 status during hospital admission. Intubated 11/15 for airway protection, acute resp failure, ?aspiration. Hg 8.2  no overt GI bleeding. Leukocytosis. INR 1.7  Liver chemistries improving. US RUQ WITH dopplers - patent vascular structures.   NH3 2

## 2020-11-19 NOTE — PROGRESS NOTES
NEUROCRITICAL CARE PROGRESS NOTE    Nima King Patient Status:  Inpatient    4/15/1960 MRN [id number]   [facility] 6NE-A Attending Chauncey Murphy MD   [facility] Day # 15 PCP Vicente Katz MD     Chief Complaint/Reason for Admission:  Gait in with triphasic waves. 11/16: Remains comatose. No change in neurological examination. Unable to follow commands. Remains with persistent hyperammonemia. Remains intubated on full mechanical ventilation.   Ampicillin was discontinued and Zosyn started rifaximin  - Antiplatelets: Continue aspirin 325 mg daily  - Antithrombotics: Not indicated  - Statin: Continue atorvastatin  - HbA1C: 5.7  -   - TTE: LVEF 65-70%  - Hypercoagulable workup essentially negative, decreased antithrombin III, protein S RISS    Renal:  Hypernatremia  Recent Labs   Lab 11/18/20  0440 11/19/20  0411   BUN 31* 37*   CREATSERUM 0.71 0.91   - Check electrolytes, replace as necessary  - Start FWF  - DC NS    Skin/Muskuloskelatal:  No skin breakdown  - Standard skin care  - Turn kg)    General: Intubated. Comatose. CVS: S1 + S2, RRR. Equal pulses bilaterally. Pulm: CTA bilaterally. ETT in place.   Abdomen: Soft ND/NT. BS +  Skin: No skin breakdown or ulcers.   Extremities: Bilateral extremity edema  Neurological Examination:  Ne

## 2020-11-19 NOTE — PROGRESS NOTES
2555 Lawrence Luis Patient Status:  Inpatient    4/15/1960 MRN [id number]   [facility] 6NE-A Attending Heavenly Stephen MD   [facility] Day # 15 PCP Catie Vargas MD     Critical Care Progress Note     Date of Admission: 2020  2 (SINGULAIR) tab 10 mg, 10 mg, Oral, Daily  •  acetaminophen (TYLENOL) tab 650 mg, 650 mg, Oral, Q4H PRN **OR** acetaminophen (TYLENOL) 650 MG rectal suppository 650 mg, 650 mg, Rectal, Q4H PRN  •  Labetalol HCl (TRANDATE) injection 10 mg, 10 mg, Intravenou 11/19/2020    HGB 8.4 11/19/2020    HCT 26.7 11/19/2020    .1 11/19/2020    MCH 35.9 11/19/2020    MCHC 31.5 11/19/2020    RDW 17.2 11/19/2020    .0 11/19/2020     Lab Results   Component Value Date     11/19/2020    K 4.7 11/19/2020

## 2020-11-19 NOTE — PLAN OF CARE
Assumed patient care this morning around 10 am. Patient is vented on versed gtt for sedation. Patient with + cough/gag, unable to follow commands and does not move extremities to painful stimuli.  Per neuro will start to wean versed gtt throughout the day a with activity based on assessment  Outcome: Not Progressing  Goal: Achieves maximal functionality and self care  Description: INTERVENTIONS  - Encourage and assist patient to increase activity and self care with guidance from PT/OT    Outcome: Not Progress

## 2020-11-19 NOTE — DIETARY NOTE
Consult received for TF recommendations and Neuro would like 200ml free water flushes every 4 hours due to elevated sodium  Please see nutrition note dated 11/17 for full assessment, below are recommendations based on the nutrition eval.  Spoke to RN about

## 2020-11-19 NOTE — PHYSICAL THERAPY NOTE
Per chart, writer spoke to RN - Pt remains intubated and sedated. Not medically appropriate for skilled PT today. Will continue to follow.

## 2020-11-19 NOTE — RESPIRATORY THERAPY NOTE
Patient received on full support vent settings. All vital signs stable. No weaning today due to neuro status. Will cont with full support ventilation. Will cont to monitor closely.

## 2020-11-20 NOTE — RESPIRATORY THERAPY NOTE
Received patient on full vent support 18/550/40%/+5, tolerating well. Plateau 29-81. Breath sounds clear/diminished. No changes made throughout the night. Will continue to monitor.

## 2020-11-20 NOTE — PROCEDURES
Date of Procedure: 11/20/2020    LTM Video EEG  START DATE & TIME: 11/19/20 @11:30 AM  END DATE & TIME:11/20/20 @11:30 AM  TOTAL HRS OF RECORDING 24hours     DX:FRANNY  HX:A 61YO MALE WHO PRESENTED TO [facility] ON 11/7 WITH NEW ONSET SPEECH ABNORMALITY WHICH discharges or bilateral periodic lateralized epileptiform discharges (PLEDs), noted were generalized slowing and rare triphasic waves. HYPERVENTILATION: Hyperventilation was not performed.   PHOTIC STIMULATION: Photic stimulation was not performed.     IM

## 2020-11-20 NOTE — PROGRESS NOTES
ESHA HOSPITALIST  Progress Note     Emmanuel Pradhan Patient Status:  Inpatient    4/15/1960 MRN [id number]   [facility] 3NE-A Attending Dr. Khris Real # 15 PCP Adrianne Spears MD     Chief Complaint: Speech difficulty    S: Patient 107 50*   GFRNAA 103  --  93 43*   CA 6.1*  --  7.2* 6.7*   ALB 1.7*  --  1.9* 1.9*   *  --  150* 147*   K 3.2* 4.3 4.7  4.7 4.5   *  --  122* 119*   CO2 24.0  --  28.0 26.0   ALKPHO 99  --  118* 153*   *  --  185* 218*   ALT 95*  --  11 UTI/PNA  1. AEDs, Abx, lactulose/xifaxan, asa/statin  5. Aspiration PNA   1. Zosyn IV, pulm following  6. Upper GI bleed w/ acute blood loss anemia  1. Had coffee ground emesis/melena  2. hgb stable, stools- dark brown  3.  Off plavix now - last dose 11/14 bleed  #elevated ammonia - underlying cirrhosis? No hx  #seizure - status epilepticus  -intubated, off sedation  -lactulose  -KUB- ok.  May need to remove rectal tube if not having BM's  -scope when able  -on pressors, weaning  -TF when able  -ammonia risin

## 2020-11-20 NOTE — PLAN OF CARE
Patient VSS. Versed gtt turned off at 1am. Straight cathed twice tonight. Low output. EEG continues. Feeding titrated to goal. Updated wife with plan of care.

## 2020-11-20 NOTE — PLAN OF CARE
Problem: Impaired Activities of Daily Living  Goal: Achieve highest/safest level of independence in self care  Description: Interventions:  - Assess ability and encourage patient to participate in ADLs to maximize function  - Promote sitting position [facility]

## 2020-11-20 NOTE — OCCUPATIONAL THERAPY NOTE
OCCUPATIONAL THERAPY TREATMENT NOTE - INPATIENT     Room Number: 0100/2333-Z  Session: 3   Number of Visits to Meet Established Goals: 5    Presenting Problem: Ischemic CVA 11/7    History related to current admission: Pt is 64year old male admitted on 6 (cerebral vascular accident) ([facility] [address]    Melena    Upper GI bleed    Hepatic encephalopathy ([facility] [address]    Aspiration pneumonia of left lung ([facility] [address]    Status epilepticus [facility])      Past Medical History  Past Medical History:   Diagnosis Date   • Anxiety    • Nitin Zuluaga LIVING ASSESSMENT  AM-PAC ‘6-Clicks’ Inpatient Daily Activity Short Form  How much help from another person does the patient currently need…  -   Putting on and taking off regular lower body clothing?: A Lot  -   Bathing (including washing, rinsing, drying surgical mask and gloves. Patient intubated, no mask.

## 2020-11-20 NOTE — PROGRESS NOTES
2555 Lawrence Luis Patient Status:  Inpatient    4/15/1960 MRN [id number]   [facility] 6NE-A Attending Heavenly Stephen MD   [address] Day # 15 PCP Catie Vargas MD     Critical Care Progress Note     Date of Admission: 2020  2 Pantoprazole Sodium (PROTONIX) 40 mg in Sodium Chloride (PF) 0.9 % 10 mL IV push, 40 mg, Intravenous, BID  •  Montelukast Sodium (SINGULAIR) tab 10 mg, 10 mg, Oral, Daily  •  acetaminophen (TYLENOL) tab 650 mg, 650 mg, Oral, Q4H PRN **OR** acetaminophen (T .5 11/20/2020    MCH 35.9 11/20/2020    MCHC 31.3 11/20/2020    RDW 17.2 11/20/2020    .0 11/20/2020     Lab Results   Component Value Date     11/20/2020    K 4.5 11/20/2020     11/20/2020    CO2 26.0 11/20/2020    BUN 51 11/2 minutes    Araseli Chester MD

## 2020-11-20 NOTE — PROGRESS NOTES
NEUROCRITICAL CARE PROGRESS NOTE    Emmanuel Pradhan Patient Status:  Inpatient    4/15/1960 MRN [id number]   [facility] 6NE-A Attending Marta Miranda MD   [facility] Day # 15 PCP Adrianne Spears MD     Chief Complaint/Reason for Admission:  Gait in with triphasic waves. 11/16: Remains comatose. No change in neurological examination. Unable to follow commands. Remains with persistent hyperammonemia. Remains intubated on full mechanical ventilation.   Ampicillin was discontinued and Zosyn started BID  - off midazolam  - Continue lactulose and rifaximin  - Antiplatelets: Continue aspirin 325 mg daily  - Antithrombotics: Not indicated  - Statin: Continue atorvastatin  - HbA1C: 5.7  -   - TTE: LVEF 65-70%  - Hypercoagulable workup essentially n normoglycemia  - HbA1C: 5.7  - RISS    Renal:  Hypernatremia  Acute non-oliguric renal failure  Recent Labs   Lab 11/19/20 0411 11/20/20 0417   BUN 37* 51*   CREATSERUM 0.91 1.70*   - Check electrolytes, replace as necessary  - Continue FWF  - Rivera for Encounters:  11/19/20 : (!) 439 lb 13.1 oz (199.5 kg)  11/16/20 : (!) 318 lb 9 oz (144.5 kg)  11/07/20 : (!) 450 lb (204.1 kg)    General: Intubated. Comatose. CVS: S1 + S2, RRR. Equal pulses bilaterally. Pulm: CTA bilaterally.  ETT in place.   Abdomen:  chart review; Consultant collaboration on finding and tx options; Chart data review. The aggregate critical time was in direct care for this patient. This time is in addition to time spent performing other reported procedures.     Electronically signed by Leslie Hameed

## 2020-11-20 NOTE — PLAN OF CARE
Problem: NEUROLOGICAL - ADULT  Goal: Remains free of injury related to seizure activity  Description: INTERVENTIONS:  - Maintain airway, patient safety  and administer oxygen as ordered  - Monitor patient for seizure activity, document and report tobiaso

## 2020-11-20 NOTE — DIETARY NOTE
[facility]    NUTRITION ASSESSMENT    Pt does not meet malnutrition criteria. NUTRITION DIAGNOSIS/PROBLEM:    Inadequate oral intake related to physiological causes as evidenced by NPO status and intubation.      NUTRITION INTERVENTION:       TF rec oz)  08/06/19 : (!) 197.3 kg (435 lb)    NUTRITION:  Diet: NPO  Oral Supplements: none    FOOD/NUTRITION RELATED HISTORY:  Appetite: N/A  Intake: 0%  Intake Meeting Needs: TF meeting needs  Food Allergies: No  Cultural/Ethnic/Sikh Preferences Addresse

## 2020-11-21 NOTE — PLAN OF CARE
Assumed care of the pt at approx. 1930 pm. Pt unresponsive, not withdrawing to pain, no spontaneous movement noted. PERRL. Continuous EEG in place. Vented, suctioned for moderate amount of ETT & oral secretions. Weaned off Levo, VSS.  Large amount of black/

## 2020-11-21 NOTE — PROGRESS NOTES
No changes have made, will continue to monitor      11/20/20 1700   Vent Information   $ RT Standby Charge (per 15 min) 1   $ Ventilator supplies provided Yes   Ventilator Initiation 11/15/20   Ventilation Day(s) 6   Interface Invasive   Vent Type Si   Ricardo

## 2020-11-21 NOTE — PROGRESS NOTES
2555 Lawrence Luis Patient Status:  Inpatient    4/15/1960 MRN [id number]   [facility] 6NE-A Attending Tedd Lesch, MD   [facility] Day # 15 PCP Abraham Romero MD     Critical Care Progress Note     Date of Admission: 2020  2 Once  •  Pantoprazole Sodium (PROTONIX) 40 mg in Sodium Chloride (PF) 0.9 % 10 mL IV push, 40 mg, Intravenous, BID  •  Montelukast Sodium (SINGULAIR) tab 10 mg, 10 mg, Oral, Daily  •  acetaminophen (TYLENOL) tab 650 mg, 650 mg, Oral, Q4H PRN **OR** acetami RBC 2.44 11/21/2020    HGB 8.7 11/21/2020    HCT 28.6 11/21/2020    .2 11/21/2020    MCH 35.7 11/21/2020    MCHC 30.4 11/21/2020    RDW 17.0 11/21/2020    .0 11/21/2020     Lab Results   Component Value Date     11/21/2020    K 4.8 1 dopplers negative for DVT   9. FEN  - TF  - LMWH  - SCDs  10.  Dispo  - ICU   - At high risk for decompensation, remains critical     Critical Care Time greater than: 35 minutes    Austin Brooks MD

## 2020-11-22 NOTE — PROGRESS NOTES
2555 Lawrence Luis Patient Status:  Inpatient    4/15/1960 MRN [id number]   [facility] 6NE-A Attending Eli Pearson MD   [address] Day # 13 PCP Roshni Damon MD     Critical Care Progress Note     Date of Admission: 2020  2 Once  •  Pantoprazole Sodium (PROTONIX) 40 mg in Sodium Chloride (PF) 0.9 % 10 mL IV push, 40 mg, Intravenous, BID  •  Montelukast Sodium (SINGULAIR) tab 10 mg, 10 mg, Oral, Daily  •  acetaminophen (TYLENOL) tab 650 mg, 650 mg, Oral, Q4H PRN **OR** acetami 2.40 11/22/2020    HGB 8.7 11/22/2020    HCT 28.2 11/22/2020    .5 11/22/2020    MCH 36.3 11/22/2020    MCHC 30.9 11/22/2020    RDW 17.2 11/22/2020    .0 11/22/2020     Lab Results   Component Value Date     11/22/2020    K 4.3 11/22/20 remains critical     Critical Care Time greater than: 35 minutes    Kevon Lamar MD

## 2020-11-22 NOTE — PROGRESS NOTES
NEUROCRITICAL CARE PROGRESS NOTE    Isela Harry Patient Status:  Inpatient    4/15/1960 MRN [id number]   [facility] 6NE-A Attending Elise Bundy MD   [facility] Day # 13 PCP Sarah Carlson MD     Chief Complaint/Reason for Admission:  Gait in with triphasic waves. 11/16: Remains comatose. No change in neurological examination. Unable to follow commands. Remains with persistent hyperammonemia. Remains intubated on full mechanical ventilation.   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) , doubt hypoperfusion syndrome  - Neurochecks q4h  - SBP goal 100-180 mmHg, MAP >65 mmHg  - Continue lacosamide 20 bleed  - Diet: NPO  - TF via OGT  - GI prophylaxis: Pantoprazole  - Prn bowel regimen  - GI following, follow recs  - Continue lactulose and rifaximin  - Monitor ammonia levels    Endo:  Prediabetes  - Maintain normoglycemia  - HbA1C: 5.7  - RISS  - Contin 65.18 kg/m²     Intake/Output Summary (Last 24 hours) at 11/22/2020 1025  Last data filed at 11/22/2020 0800  Gross per 24 hour   Intake 3703 ml   Output 3725 ml   Net -22 ml     Wt Readings from Last 3 Encounters:  11/22/20 : (!) 441 lb 9.3 oz (200.3 kg) Re-evaluation; Obtaining additional history from family / physician / EMS; Documentation time; Old chart review; Consultant collaboration on finding and tx options; Chart data review. The aggregate critical time was in direct care for this patient.  This ti

## 2020-11-22 NOTE — PLAN OF CARE
Assumed care of the pt at approx. 1930 pm. Pt unresponsive, not withdrawing to pain, no spontaneous movement noted. PERRL. Occasional gag with suctioning, + corneal reflex. Vented, suctioned for moderate amount of ETT & oral secretions. NSR, VSS.  Moderate

## 2020-11-22 NOTE — PROGRESS NOTES
Received pt on following settings, no changes made, will continue to monitor.       11/22/20 1547   Vent Information   $ RT Standby Charge (per 15 min) 1   Ventilator Initiation 11/15/20   Ventilation Day(s) 8   Interface Invasive   Vent Type Si   Vent ID 2

## 2020-11-22 NOTE — PROGRESS NOTES
[facility] HOSPITAL[facility]  Progress Note     David Bridges Patient Status:  Inpatient    4/15/1960 MRN [id number]   [facility] 3NE-A Attending Dr. Ming Zambrano # 15 PCP Kristi Reynolds MD     Chief Complaint: Speech difficulty    S: Patient 107 50* 70   GFRNAA 93 43* 60   CA 7.2* 6.7* 7.5*   ALB 1.9* 1.9* 1.8*   * 147* 149*   K 4.7  4.7 4.5 4.8   * 119* 119*   CO2 28.0 26.0 27.0   ALKPHO 118* 153* 162*   * 218* 246*   * 137* 150*   BILT 1.5 1.5 1.2   TP 4.6* 4.9* 5.0 asa/statin  5. Aspiration PNA   1. Zosyn IV, pulm following  6. Upper GI bleed, melena with acute blood loss anemia  1. Had coffee ground emesis/melena  2. hgb stable, stools- dark brown  3. Off plavix now - last dose 11/14  4. Continue PPI IV BID  5.  Seen

## 2020-11-23 NOTE — PLAN OF CARE
Assumed patient care at 1600. Vital signs stable. Patient turned and cleaned up for some drainage around flexiseal.  Stool still with some red blood present.     Problem: NEUROLOGICAL - ADULT  Goal: Remains free of injury related to seizure activity  Descr techniques  - Monitor for opioid side effects  - Notify MD/LIP if interventions unsuccessful or patient reports new pain  - Anticipate increased pain with activity and pre-medicate as appropriate  Outcome: Progressing     Problem: CARDIOVASCULAR - ADULT  G Educate and engage patient/family in tolerated activity level and precautions    Outcome: Progressing     Problem: Impaired Activities of Daily Living  Goal: Achieve highest/safest level of independence in self care  Description: Interventions:  - Assess a Manage/alleviate anxiety  - Monitor for signs/symptoms of CO2 retention  Outcome: Progressing

## 2020-11-23 NOTE — CONSULTS
INFECTIOUS DISEASE CONSULT NOTE    Nima King Patient Status:  Inpatient    4/15/1960 MRN [id number]   [facility] 6NE-A Attending Meenu Oakley MD   [facility] Day # 12 PCP Vicente Katz that he was on antibiotics for 2 courses, he will follow up with md and inform Dr Katja Palacio as to outcome   • Visual impairment     glasses   • Vitamin D deficiency    • Wears glasses    • Weight gain      Past Surgical History:   Procedure Laterality Date 100 mL infusion, 8 mg/hr, Intravenous, Continuous  •  EPINEPHrine (ADRENALIN) 1 MG/10ML injection (CARDIAC ARREST), , , PRN  •  insulin detemir (LEVEMIR) 100 UNIT/ML flextouch 5 Units, 5 Units, Subcutaneous, [email or web address]  •  Chlorhexidine Gluconate (PERID unresponsive  Vital signs: Blood pressure (!) 86/39, pulse 91, temperature 98.5 °F (36.9 °C), temperature source Temporal, resp. rate (!) 31, height 5' 9\" (1.753 m), weight (!) 367 lb 1.1 oz (166.5 kg), SpO2 90 %.   HEENT: no scleral icterus or conjunctiva Abdomen (1 View) (cpt=74018)    Result Date: 11/20/2020  PROCEDURE:  XR ABDOMEN (1 VIEW) (CPT=74018)  INDICATIONS:  r/o obstruction, decreased output from rectal tube  COMPARISON:  None. TECHNIQUE:  Supine AP view was obtained.   PATIENT STATED HISTORY: (A additional history at this time. FINDINGS:  Interval placement of endotracheal tube, tip is projected 4.5 cm above the josy. Interval placement of enteric tube with tip projected over stomach. Low lung volumes.   Magnified heart and crowded pulmonary on 11/14/2020 at 7:22 AM       Ct Brain Or Head (21204)    Result Date: 11/10/2020  PROCEDURE:  CT BRAIN OR HEAD (89566)  COMPARISON:  [facility] MR, MRI BRAIN (W+WO) (CPT=70553), 11/07/2020, 3:32 PM.  ESHA CT, CT BRAIN (W+WO) (DPO=64379), 12/05/2013, 8: Us Abdomen Complete (cpt=76700)    Result Date: 11/14/2020  PROCEDURE:  US ABDOMEN COMPLETE (CPT=76700)  COMPARISON:  None. INDICATIONS:  RUQ, transaminitis, cirrhosis? TECHNIQUE:  Real time gray-scale ultrasound was used to evaluate the abdomen.   Olegario Ramos cisterns are patent. The gray-white matter differentiation is intact. The craniocervical junction is unremarkable. Redemonstration of evolving infarct in right michel.   This now has a reference measurement of approximately 1.9 x 1.3 cm on the diffusion-w periventricular white matter and subcortical white matter both cerebral hemispheres without restricted diffusion or abnormal enhancement nonspecific although compatible with chronic microvascular ischemic changes of aging.  VENTRICLES/SULCI:   Ventricles an CONCLUSION:  Limited visualization of the veins is scribe above. No evidence of DVT in bilateral lower extremities is noted. The common femoral, profunda, proximal to mid superficial femoral along with the distal popliteal vein are visualized.     D (CPT=71045), 11/15/2020, 6:39 PM.  [facility] , XR, XR CHEST AP PORTABLE  (CPT=71045), 11/15/2020, 12:47 PM.  INDICATIONS:  resp failure  PATIENT STATED HISTORY: (As transcribed by Technologist)  Patient offered no additional history at this time.       FINDING 11/15/2020 at 1:38 PM       Xr Chest Ap Portable  (cpt=71045)    Result Date: 11/7/2020  PROCEDURE:  XR CHEST AP PORTABLE  (CPT=71045)  TECHNIQUE:  AP chest radiograph was obtained.   COMPARISON:  [facility], XR, XR CHEST PA + LAT CHEST (CPT=71020), 4/10/20 midline shift or mass-effect. The basal cisterns are patent. There is no acute intracranial hemorrhage or extra-axial fluid collection. There is no evident fracture. The visualized paranasal sinuses and mastoid air cells are unremarkable.   Moderate d (W+WO) (CPT=70553), 11/07/2020, 3:32 PM.  INDICATIONS:  stroke  TECHNIQUE:  CT angiography of the head and neck were obtained with non-ionic contrast.  3D volume rendering images were obtained by the technologist as well as the radiologist on an independen and dilute IV bolus. There is calcific plaque involving the carotid bulbs and proximal internal carotid arteries bilaterally. Segments of vertebral arteries and carotid arteries in the neck are suboptimally opacified for calculation of % stenosis.   The r this time. FINDINGS:  ABDOMEN LIVER:  Heterogeneous echogenicity is noted. BILIARY:  Gallbladder is unremarkable. Common bile duct is not visualized. PANCREAS:  Not well visualized. SPLEEN:  Not visualized. KIDNEYS:  Normal.  Right kidney measures 13. 3 ---------------------------------------------------------------------------- History/Indications:  Generalized weakness, elevated blood pressure and glucose. ---------------------------------------------------------------------------- Procedure information The left atrium was mildly dilated. The left atrial volume was mildly increased. Right ventricle: The cavity size was normal. Systolic pressure was within the normal range. Right atrium:  The atrium was normal in size.  Mitral valve:   Mildly calcified audi 25 - 43  LV mid-wall fx shortening, PLAX              22    %        14 - 22  LV PW thickness, ED, PLAX                    1.0   cm       0.6 - 1.0  IVS/LV PW ratio, ED, PLAX                    0.63           ---------  LV ejection fraction (H)     4.6   cm       3.0 - 4.0  LA ID/bsa, A-P                               1.6   cm/m^2   1. 5 - 2.3  LA volume, S                         (H)     88    ml       18 - 58  LA volume/bsa, S                     (H)     31    ml/m^2   16 - 28  LA vo follow Hb  - follow MS   - if no new infection identified would dc abx in the next 48 hr  Thank you for allowing me to participate in the care of this patient. Please do not hesitate to call if you have any questions.    I will continue to follow with you a

## 2020-11-23 NOTE — CONSULTS
[facility]  Report of Consultation    [facility] Patient Status:  Inpatient    4/15/1960 MRN [id number]   [facility] 6NE-A Attending Wilmer Alexis MD   [facility] Day # 12 PCP Ashley Ferguson MD     Reason for Consultation:  ALEXEY/oliguria/ Duyen Sandoval MD at [facility] ENDOSCOPY   • COLONOSCOPY N/A 7/9/2019    Performed by Duyen Sandoval MD at [facility] ENDOSCOPY   • COLONOSCOPY N/A 4/1/2016    Performed by Cam Rivas MD at [facility] ENDOSCOPY   • CYST/MASS EXCISION Right 1/23/2019    Performed BID  •  Insulin Aspart Pen (NOVOLOG) 100 UNIT/ML flexpen 1-10 Units, 1-10 Units, Subcutaneous, Q6H  •  Lacosamide (VIMPAT) 200 mg in sodium chloride 0.9% 50 mL infusion, 200 mg, Intravenous, Q12H [facility]  •  MIDAZOLAM BOLUS FROM BAG FOR REFRACTORY STATUS EPILE (!) 367 lb 1.1 oz (166.5 kg)   SpO2 90%   BMI 54.18 kg/m²   Temp (24hrs), Av.4 °F (36.9 °C), Min:97.9 °F (36.6 °C), Max:98.7 °F (37.1 °C)       Intake/Output Summary (Last 24 hours) at 2020 1413  Last data filed at 2020 0815  Gross per 24 h 07/19/2014 0.71   05/02/2014 0.73     Creatinine (mg/dL)   Date Value   11/23/2020 2.67 (H)   11/23/2020 1.64 (H)   11/22/2020 1.27       No results found for: [address] [facility]   Lab 11/17/20  0549 11/18/20  0440 11/18/20  0440 11/19/20  0411 shock- EGD noted. Ongoing transfusion. Max doses 5 pressors. #3.  Acidemia- change to bicarb gtt and follow    #4. Hypernatremia- change to hypotonic IVF    #5.  resp failure- vent per pulm    #6.   Acute pontine stroke- per neuro intensivist      C

## 2020-11-23 NOTE — PROGRESS NOTES
GI note:    Case reviewed patient seen. 64year-old male with multiple acute medical issues now with shock, hypotensive likely due to acute upper GI bleed given hematochezia through rectal tube.  Differential includes PUD, ischemic, variceal, avm, malignanc

## 2020-11-23 NOTE — PLAN OF CARE
Assumed care of Ronn at [address] [facility]. Intubated, vented without sedation. Continue to be unresponsive to noxious stimulation. PERRLA, brisk. No gag; initially with cough during in-line suction but no cough reflex now. Rhonchous on auscultation.  Approx 2310, Le

## 2020-11-23 NOTE — PROGRESS NOTES
Isela Mcdonald Patient Status:  Inpatient    4/15/1960 MRN [id number]   [facility] 6NE-A Attending Elise Bundy MD   [facility] Day # 12 PCP Sarah Carlson MD     Critical Care Progress Note      Assessment/Plan:  1.  Shock  - Hypovolemic/acute infusion  200 mg Intravenous Q12H [facility]   • midazolam (VERSED) bolus from bag for refractory status epilepticus  10 mg Intravenous Once   • aspirin  325 mg Per NG Tube Daily   • lidocaine (PF)  5 mL Intradermal Once   • rifaximin  550 mg Oral BID   • lactulo 4. 9* 5.0*  --  3.8*     Recent Labs   Lab 11/19/20  1232   ABGPHT 7.35   [id number] 43   [id number] 88   ABGHCO3 23.3   ABGBE -2.2*   TEMP 98.2   WILDA Positive   SITE Right Radial   DEV Servo Adult   THGB 8.6*     No results for input(s): BNP in the last 168 h

## 2020-11-23 NOTE — PROGRESS NOTES
11/23/20 1635   Clinical Encounter Type   Visited With Family  (Family had requested a Monty Shaffer who came to pray with the family)   Continue Visiting No  (RN will page [phone] if family request further ministry)   [address]

## 2020-11-23 NOTE — PROGRESS NOTES
[facility] HOSPITAL[facility]  Progress Note     Karthikeyan [place] Patient Status:  Inpatient    4/15/1960 MRN [id number]   [facility] 3NE-A Attending Dr. Sol Zapata # 13 PCP Ngozi Berger MD     Chief Complaint: Speech difficulty    S: Patient CREATSERUM 0.91 1.70* 1.30 1.27   GFRAA 107 50* 70 72   GFRNAA 93 43* 60 62   CA 7.2* 6.7* 7.5* 7.5*   ALB 1.9* 1.9* 1.8*  --    * 147* 149* 152*   K 4.7  4.7 4.5 4.8 4.3   * 119* 119* 122*   CO2 28.0 26.0 27.0 29.0   ALKPHO 118* 153* 162*  - UTI/PNA  1. AEDs, Abx, lactulose/xifaxan, asa/statin  5. Aspiration PNA   1. Zosyn IV, pulm following  6. Upper GI bleed, melena with acute blood loss anemia  1. Had coffee ground emesis/melena  2. hgb stable, stools- dark brown  3.  Off plavix now - last d

## 2020-11-23 NOTE — OPERATIVE REPORT
Omar Josue Patient Status:  Inpatient    4/15/1960 MRN [id number]   [facility] 6NE-A Attending Macrina Ramirez MD   [facility] Day # 12 PCP Rodney Vivas MD       PREOPERATIVE DIAGNOSIS/INDICATION: Hypotensive shock 2/2 upper GI blee using bipolar electrocautery (GoldProbe). There was no bleeding at the end of the procedure. IMPRESSION:   1. Duodenal ulcer with visible vessel s/p successful endoscopic therapy.    RECOMMENDATIONS:    1. IV PPI x 72 hours then transition to PO PPI BID

## 2020-11-23 NOTE — ANESTHESIA PROCEDURE NOTES
Arterial Line  Performed by: Joseph Saxena MD  Authorized by: Joseph Saxena MD     General Information and Staff    Procedure Start:   Anesthesiologist: Joseph Saxena MD  Performed By:  Anesthesiologist  Patient Location:  ICU  Indication: continuo

## 2020-11-23 NOTE — PROCEDURES
Procedure note - Central line    Procedure:  Central line placement  Indication:  shock  Performed by: Bruno    Description of procedure: After informed consent obtained, area prepped and draped in sterile fashion.   left subclavian vein was accessed wi

## 2020-11-23 NOTE — PROGRESS NOTES
pulm / cc addendum:     Called back to bedside for worsening shock. Pt on epi / vaso / levo / cindy at max dosing. Gross blood coming from rectal tube. Concern for progressive GI bleed. Adding giapreza and amp of bicarb given.   Difficulty getting abg due t

## 2020-11-23 NOTE — PROCEDURES
ELECTROENCEPHALOGRAM REPORT      Patient Name: Kam Gamez   : 4/15/1960   Requesting Physician: Dr. Lucas Velez   Date of Test: 20 11:29 until 20 13:05  History: 64year old male with VENKATA, pontine infarct with nonconvulsive status epilepti

## 2020-11-23 NOTE — PROGRESS NOTES
[facility] HOSPITAL[facility]  Progress Note     Margaret Oswald Patient Status:  Inpatient    4/15/1960 MRN [id number]   [facility] 3NE-A Attending Dr. Grzegorz Morse # 12 PCP Francisca Posey MD     Chief Complaint: Speech difficulty    S: Patient values in this interval not displayed.        Recent Labs   Lab 11/20/20  0417 11/21/20  0432 11/22/20  0417 11/23/20  0315   * 221* 211* 342*   BUN 51* 56* 63* 80*   CREATSERUM 1.70* 1.30 1.27 1.64*   GFRAA 50* 70 72 53*   GFRNAA 43* 60 62 45*   CA pna  1. Intubated 11/15, Vent per neuro/pulm  2. Zosyn, repeat blood cx ordered, recent infectious w/u reviewed  3. Status epilepticus  1. Vimpat per neuro, off versed since 11/20, no further seizures on EEG per neuro  4. Acute CVA   1.  MRI brain unchanged Addendum: worsening acidosis, MSOF, coagulopathy, leukocytosis noted. Getting FFP/plts/bicarb/max pressors/broad abx. Spoke with pt spouse. ID/nephrology consult.      Charlene DIAS  1:54 PM     Addendum:    Agree with above note by LARISSA Figueredo metabolic acidosis, hypernatremia –nephrology also consulted and started on bicarb drip and hypotonic IV fluids per nephrology. Follow labs. Worsening leukocytosis due to shock-chest x-ray shows patchy consolidation in the lungs.   Patient has been on IV

## 2020-11-23 NOTE — PLAN OF CARE
Assumed patient care this morning around 0700 am. Upon arrival patients SBP is in the 60's. Currently maxed on Levo, cindy, vaso, and Epi gtt.  Dr. Aquino Overall at bedside, angiotensin II started and titrated up to try and keep SBP > 90. 3 units PRBC given in tot Encourage and assist patient to increase activity and self care with guidance from PT/OT    Outcome: Not Progressing     Problem: CARDIOVASCULAR - ADULT  Goal: Absence of cardiac arrhythmias or at baseline  Description: INTERVENTIONS:  - Continuous cardiac

## 2020-11-23 NOTE — ANESTHESIA POSTPROCEDURE EVALUATION
[address] Patient Status:  Inpatient   Age/Gender 64year old male MRN [id number]   [facility] 6NE-A Attending Charanjit Berrios MD   [facility] Day # 12 PCP Catie Vargas MD       Anesthesia Post-op Note    Procedure(s):  Esoph

## 2020-11-23 NOTE — PROGRESS NOTES
CC addendum #4:     Spoke with wife/family friend at bedside. Discussed the gravity of the current situation. Acidosis of unclear etiology continues to worsen. Suspicion for ischemic bowel. General surgery consulted.  He is now on multiple pressors and unab

## 2020-11-23 NOTE — PROGRESS NOTES
[facility]  Neurocritical Care Progress Note     Rosalia Rosalinda Patient Status:  Inpatient    4/15/1960 MRN [id number]   [facility] 6NE-A Attending Ramesh Saavedra MD   [facility] Day # 12 PCP Ming Lo MD     Subjective: Pantoprazole Sodium (PROTONIX) 80 mg in sodium chloride 0.9% 100 mL infusion, 8 mg/hr, Intravenous, Continuous    •  vancomycin IVPB premix 2g in 0.9% NaCl 500 mL, 25 mg/kg (Adjusted), Intravenous, Once    •  EPINEPHrine (ADRENALIN) 1 MG/10ML injection (CA (LIPITOR) tab 80 mg, 80 mg, Oral, Nightly      Physical Examination:   General- intubated   CV- RRR  Resp- coarse mechanical breath sounds bilat  Neuro-  Mental status- Does not open eyes to voice or stim, does not regard or follow commands  Cranial nerves duodenal ulcer per GI, cont PPI  Heme/ID:  Anemia- 2/2 GIB, cont prbc for goal>7  Leukocytosis- cont empiric broad spectrum abx  Endocrine:  Monitor glucose, sliding scale insulin prn  DVT Prophylaxis:  SCD’s    Goals of the Day: neurochecks, mri brain   A

## 2020-11-23 NOTE — CM/SW NOTE
Care Progression Note:  Active Acute Medical Issue:   Acute ischemic stroke ([facility] [address]   Remains intubated on vent  Last 24 hours, worsening shock, maxed out on pressors, blood coming from rectal tube, to EGD today found to have duodenal ulcer, cauterized, give

## 2020-11-23 NOTE — CONSULTS
[facility]  Report of Consultation    [facility] Patient Status:  Inpatient    4/15/1960 MRN [id number]   [facility] 6NE-A Attending Byron Katz MD   [facility] Day # 12 PCP Anastasia Cheadle, MD     Reason for Consultation:  Rectal bleedi noted to have gross blood from the rectal tube. Gastroenterology performed a bedside EGD earlier today. There was a visible duodenal ulcer with a vessel noted. This was cauterized. Despite this the patient continued to have gross blood per rectum.   His of R. post. shoulder cyst      Family History   Problem Relation Age of Onset   • Other (ruptured AAA) Father    • Diabetes Mother    • Cancer Neg    • Blood Disorder Neg       reports that he has never smoked.  He has never used smokeless tobacco. He repor Aspart Pen (NOVOLOG) 100 UNIT/ML flexpen 1-10 Units, 1-10 Units, Subcutaneous, Q6H  •  Lacosamide (VIMPAT) 200 mg in sodium chloride 0.9% 50 mL infusion, 200 mg, Intravenous, Q12H [facility]  •  MIDAZOLAM BOLUS FROM BAG FOR REFRACTORY STATUS EPILEPTICUS 10 mg, 10 the OG without noted blood. Without scleral icterus. Mucous membranes are moist.  No noted EOMs    Neck: Supple, no motion attempted. Lungs: Breath sounds are diminished bilaterally. Cardiac: Regular rate and rhythm. No murmur appreciable.     Abdom Upper GI bleed     Hepatic encephalopathy (HCC)     Aspiration pneumonia of left lung (HCC)     Status epilepticus (HCC)     Encephalopathy     Oliguria     ALEXEY (acute kidney injury) ([facility] [address]     Acidosis     Hemorrhagic shock (HCC)     Acute respiratory fail favor continuing medical interventions. Ongoing goals of care conversations pending the patient's clinical course  4. Recommend holding all OG medications as the patient is not likely to absorb any of these medications at this time.   Additionally would co intervention.   Furthermore, if he does not fact have ischemic bowel, I would be unable to resect any bowel since his anastomosis would most likely not heal.  3. After discussion, the family and friend agree to not proceed with any surgical intervention but

## 2020-11-23 NOTE — PROGRESS NOTES
[facility] Pharmacy Note: Antimicrobial Weight Based Dose Adjustment for: piperacillin/tazobactam (Luellen Comp)    Natasha Sprague is a 64year old patient who has been prescribed piperacillin/tazobactam (ZOSYN) 3.375 gm every 8 hours.     Estimated Creatinine Clearance

## 2020-11-23 NOTE — PROGRESS NOTES
[facility] Pharmacy Note:  Renal Adjustment for meropenem (MERREM)    Tammi Morales is a 64year old patient who has been prescribed meropenem (MERREM) 1000 mg every 24 hrs.   CrCl is estimated creatinine clearance is 30.2 mL/min (A) (based on SCr of 2.67 mg/dL

## 2020-11-23 NOTE — PROGRESS NOTES
Pt BP has dropped and Hgb down to 6.9. Continued melena noted in rectal pouch. 1 unit PRBC ordered. Family unable to be contacted for consent. Will proceed with transfusion as a medical necessity.       [address] [facility]  Nurse Practitioner

## 2020-11-23 NOTE — ANESTHESIA PREPROCEDURE EVALUATION
PRE-OP EVALUATION    Patient Name: Clifford Pfeiffer    Pre-op Diagnosis: gi bleeding    Procedure(s):  Esophagogastroduodenoscopy    Surgeon(s) and Role:     * Stiven French, DO - Primary    Pre-op vitals reviewed.   Temp: 98.3 °F (36.8 °C)  Pulse: 86  Re BID    •  Insulin Aspart Pen (NOVOLOG) 100 UNIT/ML flexpen 1-10 Units, 1-10 Units, Subcutaneous, Q6H    •  [COMPLETED] Lacosamide (VIMPAT) 400 mg in sodium chloride 0.9% 50 mL infusion, 400 mg, Intravenous, Once    •  Lacosamide (VIMPAT) 200 mg in sodium c Montelukast Sodium (SINGULAIR) tab 10 mg, 10 mg, Oral, Daily    •  acetaminophen (TYLENOL) tab 650 mg, 650 mg, Oral, Q4H PRN    Or    •  acetaminophen (TYLENOL) 650 MG rectal suppository 650 mg, 650 mg, Rectal, Q4H PRN    •  Labetalol HCl (TRANDATE) inject encephalopathy ([facility] [address]     Aspiration pneumonia of left lung ([facility] [address]     Status epilepticus [facility])          Past Surgical History:   Procedure Laterality Date   • COLONOSCOPY N/A 11/19/2019    Performed by Maicol Nettles MD at [facility] ENDOSCOPY   • COLONOSCOPY N anesthesia, which likely will include deep sedation.  Implied that memory of procedure is unlikely although intraop recall, if it occurs, may be a reasonable and comfortable experience with this anesthetic.  Aware that general anesthesia is not intended th

## 2020-11-24 NOTE — RESPIRATORY THERAPY NOTE
Received patient @2300 on full vent support PRVC 18/550/60%/+5. Plateau 50-61. Breath sounds diminished. FiO2 increased to 100%, sats low 90s. No other changes made throughout the night. Will continue to monitor.

## 2020-11-24 NOTE — PROGRESS NOTES
11/24/20 0752   Clinical Encounter Type   Visited With Family  (Spouse and her friend.)   Continue Visiting Yes   Patient's Supportive Strategies/Resources [place]/per spouse.    Referral To Nurse  Kory Mirza would like to take Otelia Skill to [place] to be bu

## 2020-11-24 NOTE — DISCHARGE SUMMARY
[facility] PSYCHIATRIC [place] HOSPITALIST  DISCHARGE SUMMARY     Natasha Srpague Patient Status:  Inpatient    4/15/1960 MRN [id number]   [facility] 6NE-A Attending Ana Cotter MD   [facility] Day # 16 PCP Chester Hill MD     Date of Admission: 2020  Date of Robin Frank last 6 months due to inactivity with gym being closed. Denies CP/SOB. Denies N/V/D. Brief Synopsis: Patient is a 62-year-old male presented with weakness, speech changes, elevated glucose/blood pressure.   MRI showed acute infarct involving the right po recommended  11/16  IMPRESSION: Abnormal study due to diffuse slowing and generalized periodic discharges intermixed with triphasic waves suggestive of active epileptiform discharges and some degree of metabolic encephalopathy, there is no electrographic e support.   Patient made comfort care by patient's wife today after discussion with critical care physician  Status epilepticus  Hypovolemic shock due to upper GI bleed  Anemia of acute blood loss due to upper GI bleed status post transfusion  GI bleed due t

## 2020-11-24 NOTE — PROGRESS NOTES
Patient assessed multiple times overnight. Noted to have significant lactic acidosis and shock with limited response to vasopressors.         Albumin x 2 and 1 PRBC given    Discussed code status with wife who understands CPR is futile, but would still w

## 2020-11-24 NOTE — PLAN OF CARE
Assumed care of Ronn at [facility]. Remained intubated, vented, off sedation on multiple vasopressors at max dose: Geoprezam Epi, Levophed, Neosynephrine, Vasopressin drips are all infusing. Also on HCO3, and Protonix drip.  Neuro status unchanged- saul SHANNON

## 2020-11-24 NOTE — PROGRESS NOTES
Received patient at 0730 family at bedside comfort orders received. Turned off IV medications at 0800. Patient asytole at 6617.

## 2020-11-24 NOTE — RESPIRATORY THERAPY NOTE
Received pt on PRVC 18/550/40%/+5. ABG done, verified and called result to MD. Increased FIO2 to 60% due to pt's Sats drop. Will continue to monitor.

## 2020-11-24 NOTE — PROGRESS NOTES
Pulm / cc    Progressive lactic acidosis overnight. On labs, progressive renal failure, liver failure, and coagulopathy. Remains on 5 pressors with bp in the 50s. Discussed at length with pt's wife at bedside. She has opted to transition to comfort care.

## 2021-11-03 NOTE — PATIENT INSTRUCTIONS
Plan:  Continue with medications:   Go to the lab for blood work   Follow up with me in 1 month  Schedule follow up appointments: Kian Cochran (dietitian) & Giselle Bailey (behavorial psychologist)    Please try to work on the following dietary changes:  1.  D generally intact

## 2022-06-23 NOTE — MR AVS SNAPSHOT
Impression: Age-related nuclear cataract, bilateral: H25.13. Plan: Cataracts account for the patient's complaints. Discussed all risks, benefits, procedures and recovery for cataract surgery in detail with the patient. Patient understands changing glasses will not improve vision. Patient desires to have surgery, recommend phacoemulsification with intraocular lens implant. Discussed lens implant options. Patient would like standard lens with a distance refractive goal.  Patient understands that glasses are needed after surgery. Patient is Dexycu candidate.  RL2. Discuss Multifocal lens and toric. Consider toric if indicated on ascan. [address]  [address]  [address] [address]  [phone]               Thank you for choosing us for your health care visit with Doe Castle PA-C.   We are glad to serve you and happy to provide you with this summar OP REFERAL TO CPAP TITRATION STUDY [[id number] CUSTOM]  Order #:  [id number]         **REFERRAL REQUEST**    Your physician has referred you to a specialist.  Please call the number below to schedule an appointment.     [place] [address] Use as directed.  Check fasting sugar once per day   Commonly known as:  CELY CONTOUR NEXT TEST           Lancets Misc   To check blood sugar once daily                Where to Get Your Medications      These medications were sent to [facility] [address]

## 2025-03-18 NOTE — ED INITIAL ASSESSMENT (HPI)
Pt here due to chills, fever and nausea last night. This morning, Pt felt that his left leg is tender to touch and has cellulitis on his left leg.
Patient

## (undated) DEVICE — STERILE POLYISOPRENE POWDER-FREE SURGICAL GLOVES: Brand: PROTEXIS

## (undated) DEVICE — ENDOSCOPY PACK - LOWER: Brand: MEDLINE INDUSTRIES, INC.

## (undated) DEVICE — 1200CC GUARDIAN II: Brand: GUARDIAN

## (undated) DEVICE — 3M™ RED DOT™ MONITORING ELECTRODE WITH FOAM TAPE AND STICKY GEL, 50/BAG, 20/CASE, 72/PLT 2570: Brand: RED DOT™

## (undated) DEVICE — SINGLE USE SPLINTING TUBE: Brand: SINGLE USE SPLINTING TUBE

## (undated) DEVICE — Device: Brand: DEFENDO AIR/WATER/SUCTION AND BIOPSY VALVE

## (undated) DEVICE — GAUZE SPONGES,USP TYPE VII GAUZE, 12 PLY: Brand: CURITY

## (undated) DEVICE — SYRINGE 60ML SLIP TIP

## (undated) DEVICE — SNARE CAPTIFLEX MICRO-OVL OLY

## (undated) DEVICE — TRAP 4 CPTR CHMBR N EZ INLN

## (undated) DEVICE — FILTERLINE NASAL ADULT O2/CO2

## (undated) DEVICE — SUTURE VICRYL 2-0 SH

## (undated) DEVICE — BIPOLAR ELECTROHEMOSTASIS CATHETER: Brand: GOLD PROBE

## (undated) DEVICE — SOL  .9 1000ML BTL

## (undated) DEVICE — KENDALL SCD EXPRESS SLEEVES, KNEE LENGTH, MEDIUM: Brand: KENDALL SCD

## (undated) DEVICE — Device

## (undated) DEVICE — MEDI-VAC SUCTION HANDLE REGULAR CAPACITY: Brand: CARDINAL HEALTH

## (undated) DEVICE — ENDOSCOPY CHANNEL ADAPTER: Brand: ERBE

## (undated) DEVICE — ENDOSCOPY PACK UPPER: Brand: MEDLINE INDUSTRIES, INC.

## (undated) DEVICE — ENDOCUFF ADULT GREEN

## (undated) DEVICE — MINI LAP PACK-LF: Brand: MEDLINE INDUSTRIES, INC.

## (undated) DEVICE — NEEDLE CONTRAST INTERJECT 25G

## (undated) DEVICE — SUTURE VICRYL 3-0 SH

## (undated) DEVICE — MEDI-VAC NON-CONDUCTIVE SUCTION TUBING: Brand: CARDINAL HEALTH

## (undated) DEVICE — SUTURE MONOCRYL 4-0 PS-2

## (undated) NOTE — ED AVS SNAPSHOT
Margo Mateo   MRN: [id number]    Department:  THE [facility] Emergency Department in [place]   Date of Visit:  8/18/2017           Disclosure     Insurance plans vary and the physician(s) referred by the ER may not be covered by your plan.  Please contac If you have been prescribed any medication(s), please fill your prescription right away and begin taking the medication(s) as directed    If the emergency physician has read X-rays, these will be re-interpreted by a radiologist.  If there is a significant

## (undated) NOTE — LETTER
[facility] [address], [address]  Consent for Procedure/Sedation    Date: 11/23/2020    Time: 0800      1. I authorize the performance upon Gris Bedoya the following:  hospitals Financial Placement     2.  I authorize Dr. Antoni Abbasi (a Printed: 2020   7:58 AM  Patient Name: Jorgito Kenny        : 4/15/1960       Medical Record #: [id number]

## (undated) NOTE — LETTER
07/17/18        Kam Gamez  [address] [address]      Dear Ernesto Victor,    Our records indicate that you have outstanding lab work and or testing that was ordered for you and has not yet been completed:          CBC W Differential W Platel

## (undated) NOTE — LETTER
02/12/19    Dear Katherine Colin MD,    I am seeing Jadyn Hurtado in the office today after shoulder cyst excision on 1/23/2019            Assessment   Epidermoid cyst  (primary encounter diagnosis)      Planbody   The patient is doing well after Excision o

## (undated) NOTE — Clinical Note
I had the pleasure of seeing Bob Ly for cellulitis of left lower extremity. He was treated with bactrim and d/c'd doxycycline. I advised him to follow up with you later this week for reevaluation.  Thank you- MARVIN Lauren

## (undated) NOTE — ED AVS SNAPSHOT
Jorgito Kenny   MRN: [id number]    Department:  [facility] Emergency Department in [place]   Date of Visit:  10/13/2017           Disclosure     Insurance plans vary and the physician(s) referred by the ER may not be covered by your plan.  Please conta If you have been prescribed any medication(s), please fill your prescription right away and begin taking the medication(s) as directed    If the emergency physician has read X-rays, these will be re-interpreted by a radiologist.  If there is a significant

## (undated) NOTE — LETTER
19    Patient: Austin Arshad  : 4/15/1960 Visit date: 2019    Dear  Dr. Amanda Do MD,    Thank you for referring Austin Arshad to my practice. Please find my assessment and plan below.                 Assessment   Skin cyst  (primary encou

## (undated) NOTE — IP AVS SNAPSHOT
[address]            (For Outpatient Use Only) Initial Admit Date: 11/7/2020   Inpt/Obs Admit Date: Inpt: 11/7/20 / Obs: N/A   Discharge Date:    Carylon Kawasaki:  [de-identified]   MRN: [de-identified]   CSN: [id number]   CEID: [id number] Subscriber ID:  Pt Rel to Subscriber:    Hospital Account Financial Class: [facility]    November 12, 2020

## (undated) NOTE — LETTER
Date: 2/28/2019    Patient Name: Austin Arshad          To Whom it may concern: The above patient was seen at the [facility]. This patient has been medically cleared for surgery. Please contact the office with any questions.         Thanh Concepcion

## (undated) NOTE — MR AVS SNAPSHOT
[address]  [address] [facility] [address]  [phone]               Thank you for choosing us for your health care visit with Alycia Briggs PA-C.   We are glad to serve you and happy to provide you with this summar Assoc Dx:   Morbid obesity, unspecified obesity type ([facility] [E66.01]          Reason for Today's Visit     Lab Results           Medical Issues Discussed Today     Morbid obesity ([facility]      Instructions and Information about Your Health     None      Allerg

## (undated) NOTE — LETTER
07/17/19        [place] [address] [address]      Dear Atif Jin,    Our records indicate that you have outstanding lab work and or testing that was ordered for you and has not yet been completed:  Orders Placed This Encounter

## (undated) NOTE — LETTER
07/20/17        Gris Bedoya  [address] [place] [address]      Dear Herb Salas records indicate that you have outstanding lab work and or testing that was ordered for you and has not yet been completed:          CBC W Differential W Platelet [E

## (undated) NOTE — LETTER
[address]  [address] [facility] [address], [address] [facility]  Authorization for Surgical Operation and Procedure     Date:_11/23/2020__________                                                                                                         Time:__ 4.   Should the need arise during my operation or immediate post-operative period, I also consent to the administration of blood and/or blood products.   Further, I understand that despite careful testing and screening of blood or blood products by laura 8.   I recognize that in the event my procedure results in extended X-Ray/fluoroscopy time, I may develop a skin reaction. 9.  If I have a Do Not Attempt Resuscitation (DNAR) order in place, that status will be suspended while in the operating room, proc 1. IOmar Ours agree to be cared for by an anesthesiologist, who is specially trained to monitor me and give me medicine to put me to sleep or keep me comfortable during my procedure    I understand that my anesthesiologist is not an employee or age 5. My doctor has explained to me other choices available to me for my care (alternatives).   6. Pregnant Patients (“epidural”):  I understand that the risks of having an epidural (medicine given into my back to help control pain during labor), include itchi
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
## (undated) NOTE — MR AVS SNAPSHOT
[address]  [address] [place] [address] [phone]               Thank you for choosing us for your health care visit with Dejuan Hernandez PA-C.   We are glad to serve you and happy to provide you with this summa - [address] AT [facility], [phone], [address] [facility] [address]    Hours:  24-hours Phone:  [phone]    - furosemide 40 MG Tabs  - Potassium Chloride ER 20 MEQ Tbcr            MyChart     V